# Patient Record
Sex: MALE | Race: WHITE | Employment: OTHER | URBAN - NONMETROPOLITAN AREA
[De-identification: names, ages, dates, MRNs, and addresses within clinical notes are randomized per-mention and may not be internally consistent; named-entity substitution may affect disease eponyms.]

---

## 2017-01-09 ENCOUNTER — OFFICE VISIT (OUTPATIENT)
Dept: CARDIOLOGY | Age: 82
End: 2017-01-09
Payer: MEDICARE

## 2017-01-09 VITALS
SYSTOLIC BLOOD PRESSURE: 102 MMHG | WEIGHT: 238 LBS | DIASTOLIC BLOOD PRESSURE: 60 MMHG | HEART RATE: 80 BPM | BODY MASS INDEX: 36.07 KG/M2 | HEIGHT: 68 IN

## 2017-01-09 DIAGNOSIS — I25.10 ASHD (ARTERIOSCLEROTIC HEART DISEASE): ICD-10-CM

## 2017-01-09 DIAGNOSIS — I49.5 SICK SINUS SYNDROME (HCC): Primary | ICD-10-CM

## 2017-01-09 DIAGNOSIS — Z95.0 PACEMAKER: ICD-10-CM

## 2017-01-09 DIAGNOSIS — I50.32 CHRONIC DIASTOLIC HEART FAILURE (HCC): ICD-10-CM

## 2017-01-09 DIAGNOSIS — I44.2 COMPLETE HEART BLOCK (HCC): ICD-10-CM

## 2017-01-09 PROCEDURE — 99214 OFFICE O/P EST MOD 30 MIN: CPT | Performed by: INTERNAL MEDICINE

## 2017-01-09 PROCEDURE — 93280 PM DEVICE PROGR EVAL DUAL: CPT | Performed by: INTERNAL MEDICINE

## 2017-01-09 RX ORDER — BUMETANIDE 1 MG/1
1 TABLET ORAL DAILY
COMMUNITY
Start: 2016-12-31 | End: 2017-07-07 | Stop reason: CLARIF

## 2017-04-11 DIAGNOSIS — Z95.0 PACEMAKER: Primary | ICD-10-CM

## 2017-04-11 DIAGNOSIS — I44.2 COMPLETE HEART BLOCK (HCC): ICD-10-CM

## 2017-04-11 PROCEDURE — 93296 REM INTERROG EVL PM/IDS: CPT | Performed by: CLINICAL NURSE SPECIALIST

## 2017-04-11 PROCEDURE — 93294 REM INTERROG EVL PM/LDLS PM: CPT | Performed by: CLINICAL NURSE SPECIALIST

## 2017-05-23 LAB
ALBUMIN SERPL-MCNC: 4 G/DL (ref 3.5–5.2)
ALP BLD-CCNC: 77 U/L (ref 40–130)
ALT SERPL-CCNC: 12 U/L (ref 5–41)
ANION GAP SERPL CALCULATED.3IONS-SCNC: 18 MMOL/L (ref 7–19)
AST SERPL-CCNC: 15 U/L (ref 5–40)
BILIRUB SERPL-MCNC: 0.5 MG/DL (ref 0.2–1.2)
BILIRUBIN DIRECT: 0.2 MG/DL (ref 0–0.3)
BILIRUBIN, INDIRECT: 0.3 MG/DL (ref 0.1–1)
BUN BLDV-MCNC: 33 MG/DL (ref 8–23)
CALCIUM SERPL-MCNC: 9.3 MG/DL (ref 8.2–9.6)
CHLORIDE BLD-SCNC: 102 MMOL/L (ref 98–111)
CHOLESTEROL, TOTAL: 133 MG/DL (ref 160–199)
CO2: 21 MMOL/L (ref 22–29)
CREAT SERPL-MCNC: 1.4 MG/DL (ref 0.5–1.2)
CREATININE URINE: 73.5 MG/DL (ref 4.2–622)
GFR NON-AFRICAN AMERICAN: 47
GLUCOSE BLD-MCNC: 127 MG/DL (ref 74–109)
HBA1C MFR BLD: 9.6 %
HDLC SERPL-MCNC: 37 MG/DL (ref 55–121)
LDL CHOLESTEROL CALCULATED: 71 MG/DL
MICROALBUMIN UR-MCNC: <1.2 MG/DL (ref 0–19)
MICROALBUMIN/CREAT UR-RTO: NORMAL MG/G
POTASSIUM SERPL-SCNC: 4.8 MMOL/L (ref 3.5–5)
SODIUM BLD-SCNC: 141 MMOL/L (ref 136–145)
TOTAL PROTEIN: 7.8 G/DL (ref 6.6–8.7)
TRIGL SERPL-MCNC: 126 MG/DL (ref 150–199)

## 2017-07-07 ENCOUNTER — OFFICE VISIT (OUTPATIENT)
Dept: INTERNAL MEDICINE | Age: 82
End: 2017-07-07
Payer: MEDICARE

## 2017-07-07 VITALS
SYSTOLIC BLOOD PRESSURE: 110 MMHG | OXYGEN SATURATION: 97 % | WEIGHT: 233 LBS | BODY MASS INDEX: 39.78 KG/M2 | HEIGHT: 64 IN | HEART RATE: 61 BPM | DIASTOLIC BLOOD PRESSURE: 64 MMHG

## 2017-07-07 DIAGNOSIS — E78.00 PURE HYPERCHOLESTEROLEMIA: Chronic | ICD-10-CM

## 2017-07-07 DIAGNOSIS — Z79.4 TYPE 2 DIABETES MELLITUS WITH COMPLICATION, WITH LONG-TERM CURRENT USE OF INSULIN (HCC): Primary | Chronic | ICD-10-CM

## 2017-07-07 DIAGNOSIS — D63.1 ANEMIA IN CHRONIC KIDNEY DISEASE: Chronic | ICD-10-CM

## 2017-07-07 DIAGNOSIS — N18.9 ANEMIA IN CHRONIC KIDNEY DISEASE: Chronic | ICD-10-CM

## 2017-07-07 DIAGNOSIS — E11.8 TYPE 2 DIABETES MELLITUS WITH COMPLICATION, WITH LONG-TERM CURRENT USE OF INSULIN (HCC): Primary | Chronic | ICD-10-CM

## 2017-07-07 PROCEDURE — 1123F ACP DISCUSS/DSCN MKR DOCD: CPT | Performed by: INTERNAL MEDICINE

## 2017-07-07 PROCEDURE — 99214 OFFICE O/P EST MOD 30 MIN: CPT | Performed by: INTERNAL MEDICINE

## 2017-07-07 PROCEDURE — 4040F PNEUMOC VAC/ADMIN/RCVD: CPT | Performed by: INTERNAL MEDICINE

## 2017-07-07 PROCEDURE — G8419 CALC BMI OUT NRM PARAM NOF/U: HCPCS | Performed by: INTERNAL MEDICINE

## 2017-07-07 PROCEDURE — 1036F TOBACCO NON-USER: CPT | Performed by: INTERNAL MEDICINE

## 2017-07-07 PROCEDURE — G8598 ASA/ANTIPLAT THER USED: HCPCS | Performed by: INTERNAL MEDICINE

## 2017-07-07 PROCEDURE — G8427 DOCREV CUR MEDS BY ELIG CLIN: HCPCS | Performed by: INTERNAL MEDICINE

## 2017-07-07 RX ORDER — FUROSEMIDE 40 MG/1
40 TABLET ORAL DAILY
COMMUNITY
End: 2017-09-11 | Stop reason: CLARIF

## 2017-07-07 RX ORDER — AMLODIPINE BESYLATE 5 MG/1
5 TABLET ORAL DAILY
COMMUNITY
End: 2017-10-08 | Stop reason: SDUPTHER

## 2017-07-07 ASSESSMENT — PATIENT HEALTH QUESTIONNAIRE - PHQ9
SUM OF ALL RESPONSES TO PHQ9 QUESTIONS 1 & 2: 2
SUM OF ALL RESPONSES TO PHQ QUESTIONS 1-9: 2
1. LITTLE INTEREST OR PLEASURE IN DOING THINGS: 1
2. FEELING DOWN, DEPRESSED OR HOPELESS: 1

## 2017-07-12 ENCOUNTER — OFFICE VISIT (OUTPATIENT)
Dept: CARDIOLOGY | Age: 82
End: 2017-07-12
Payer: MEDICARE

## 2017-07-12 VITALS
WEIGHT: 239 LBS | BODY MASS INDEX: 40.8 KG/M2 | SYSTOLIC BLOOD PRESSURE: 128 MMHG | HEART RATE: 84 BPM | HEIGHT: 64 IN | DIASTOLIC BLOOD PRESSURE: 60 MMHG

## 2017-07-12 DIAGNOSIS — Z95.0 PACEMAKER: ICD-10-CM

## 2017-07-12 DIAGNOSIS — I50.32 CHRONIC DIASTOLIC CONGESTIVE HEART FAILURE (HCC): ICD-10-CM

## 2017-07-12 DIAGNOSIS — I44.2 COMPLETE HEART BLOCK (HCC): Primary | ICD-10-CM

## 2017-07-12 DIAGNOSIS — I25.10 CORONARY ARTERY DISEASE INVOLVING NATIVE CORONARY ARTERY OF NATIVE HEART WITHOUT ANGINA PECTORIS: Chronic | ICD-10-CM

## 2017-07-12 PROCEDURE — G8598 ASA/ANTIPLAT THER USED: HCPCS | Performed by: CLINICAL NURSE SPECIALIST

## 2017-07-12 PROCEDURE — 1036F TOBACCO NON-USER: CPT | Performed by: CLINICAL NURSE SPECIALIST

## 2017-07-12 PROCEDURE — 1123F ACP DISCUSS/DSCN MKR DOCD: CPT | Performed by: CLINICAL NURSE SPECIALIST

## 2017-07-12 PROCEDURE — 99213 OFFICE O/P EST LOW 20 MIN: CPT | Performed by: CLINICAL NURSE SPECIALIST

## 2017-07-12 PROCEDURE — 93280 PM DEVICE PROGR EVAL DUAL: CPT | Performed by: CLINICAL NURSE SPECIALIST

## 2017-07-12 PROCEDURE — 4040F PNEUMOC VAC/ADMIN/RCVD: CPT | Performed by: CLINICAL NURSE SPECIALIST

## 2017-07-12 PROCEDURE — G8427 DOCREV CUR MEDS BY ELIG CLIN: HCPCS | Performed by: CLINICAL NURSE SPECIALIST

## 2017-07-12 PROCEDURE — G8417 CALC BMI ABV UP PARAM F/U: HCPCS | Performed by: CLINICAL NURSE SPECIALIST

## 2017-07-12 ASSESSMENT — ENCOUNTER SYMPTOMS
COUGH: 0
BLURRED VISION: 0
BLOOD IN STOOL: 0
HEARTBURN: 0
NAUSEA: 0
ORTHOPNEA: 0
SHORTNESS OF BREATH: 1
VOMITING: 0

## 2017-08-28 RX ORDER — EZETIMIBE 10 MG/1
TABLET ORAL
Qty: 90 TABLET | Refills: 1 | Status: SHIPPED | OUTPATIENT
Start: 2017-08-28 | End: 2018-02-24 | Stop reason: SDUPTHER

## 2017-08-30 RX ORDER — POLYETHYLENE GLYCOL 3350 17 G/17G
POWDER, FOR SOLUTION ORAL
COMMUNITY
Start: 2017-08-07 | End: 2018-05-17 | Stop reason: SDUPTHER

## 2017-08-31 RX ORDER — CARVEDILOL 3.12 MG/1
TABLET ORAL
Qty: 180 TABLET | Refills: 1 | Status: SHIPPED | OUTPATIENT
Start: 2017-08-31 | End: 2018-02-27 | Stop reason: SDUPTHER

## 2017-09-07 RX ORDER — INSULIN GLARGINE 100 [IU]/ML
INJECTION, SOLUTION SUBCUTANEOUS
Qty: 50 ML | Refills: 3 | Status: SHIPPED | OUTPATIENT
Start: 2017-09-07 | End: 2018-12-17 | Stop reason: SDUPTHER

## 2017-09-07 RX ORDER — SPIRONOLACTONE 25 MG/1
TABLET ORAL
Qty: 90 TABLET | Refills: 3 | Status: ON HOLD | OUTPATIENT
Start: 2017-09-07 | End: 2018-08-26

## 2017-09-11 ENCOUNTER — OFFICE VISIT (OUTPATIENT)
Dept: INTERNAL MEDICINE | Age: 82
End: 2017-09-11
Payer: MEDICARE

## 2017-09-11 VITALS
HEIGHT: 66 IN | DIASTOLIC BLOOD PRESSURE: 80 MMHG | OXYGEN SATURATION: 98 % | HEART RATE: 76 BPM | SYSTOLIC BLOOD PRESSURE: 136 MMHG | WEIGHT: 232 LBS | BODY MASS INDEX: 37.28 KG/M2

## 2017-09-11 DIAGNOSIS — C18.4 MALIGNANT NEOPLASM OF TRANSVERSE COLON (HCC): ICD-10-CM

## 2017-09-11 DIAGNOSIS — L73.9 ACUTE FOLLICULITIS: ICD-10-CM

## 2017-09-11 DIAGNOSIS — R32 URINARY INCONTINENCE, UNSPECIFIED TYPE: Primary | ICD-10-CM

## 2017-09-11 DIAGNOSIS — R35.1 NOCTURIA MORE THAN TWICE PER NIGHT: ICD-10-CM

## 2017-09-11 PROCEDURE — 4040F PNEUMOC VAC/ADMIN/RCVD: CPT | Performed by: INTERNAL MEDICINE

## 2017-09-11 PROCEDURE — 99214 OFFICE O/P EST MOD 30 MIN: CPT | Performed by: INTERNAL MEDICINE

## 2017-09-11 PROCEDURE — G8598 ASA/ANTIPLAT THER USED: HCPCS | Performed by: INTERNAL MEDICINE

## 2017-09-11 PROCEDURE — G8427 DOCREV CUR MEDS BY ELIG CLIN: HCPCS | Performed by: INTERNAL MEDICINE

## 2017-09-11 PROCEDURE — 1036F TOBACCO NON-USER: CPT | Performed by: INTERNAL MEDICINE

## 2017-09-11 PROCEDURE — G8417 CALC BMI ABV UP PARAM F/U: HCPCS | Performed by: INTERNAL MEDICINE

## 2017-09-11 PROCEDURE — 1123F ACP DISCUSS/DSCN MKR DOCD: CPT | Performed by: INTERNAL MEDICINE

## 2017-09-11 RX ORDER — BUMETANIDE 1 MG/1
1 TABLET ORAL DAILY
COMMUNITY
End: 2017-10-12 | Stop reason: CLARIF

## 2017-09-11 RX ORDER — DOXYCYCLINE HYCLATE 100 MG/1
100 CAPSULE ORAL 2 TIMES DAILY
Qty: 20 CAPSULE | Refills: 0 | Status: SHIPPED | OUTPATIENT
Start: 2017-09-11 | End: 2017-09-21

## 2017-09-11 ASSESSMENT — ENCOUNTER SYMPTOMS
WHEEZING: 0
SHORTNESS OF BREATH: 1
EYE ITCHING: 0
BACK PAIN: 0
EYE DISCHARGE: 0
BLOOD IN STOOL: 0
NAUSEA: 0
SORE THROAT: 0
VOMITING: 0
TROUBLE SWALLOWING: 0
ABDOMINAL PAIN: 0
ABDOMINAL DISTENTION: 0

## 2017-09-25 RX ORDER — ATORVASTATIN CALCIUM 10 MG/1
TABLET, FILM COATED ORAL
Qty: 90 TABLET | Refills: 1 | Status: SHIPPED | OUTPATIENT
Start: 2017-09-25 | End: 2018-03-24 | Stop reason: SDUPTHER

## 2017-09-25 RX ORDER — FUROSEMIDE 40 MG/1
TABLET ORAL
Qty: 90 TABLET | Refills: 1 | Status: SHIPPED | OUTPATIENT
Start: 2017-09-25 | End: 2018-03-24 | Stop reason: SDUPTHER

## 2017-10-09 RX ORDER — TIMOLOL MALEATE 5 MG/ML
1 SOLUTION/ DROPS OPHTHALMIC 2 TIMES DAILY
COMMUNITY
Start: 2017-10-04

## 2017-10-09 RX ORDER — AMLODIPINE BESYLATE 5 MG/1
TABLET ORAL
Qty: 90 TABLET | Refills: 1 | Status: SHIPPED | OUTPATIENT
Start: 2017-10-09 | End: 2018-04-07 | Stop reason: SDUPTHER

## 2017-10-09 NOTE — TELEPHONE ENCOUNTER
Bao Mendez called requesting a refill of the below medication which has been pended for you:     Requested Prescriptions     Pending Prescriptions Disp Refills    amLODIPine (NORVASC) 5 MG tablet [Pharmacy Med Name: AMLODIPINE BESYLATE TABS 5MG] 90 tablet 1     Sig: TAKE 1 TABLET DAILY (MUST KEEP FOLLOW UP FOR ADDITIONAL REFILLS)       Last Appointment Date: 9/11/2017  Next Appointment Date: 10/12/2017    No Known Allergies

## 2017-10-12 ENCOUNTER — OFFICE VISIT (OUTPATIENT)
Dept: INTERNAL MEDICINE | Age: 82
End: 2017-10-12
Payer: MEDICARE

## 2017-10-12 VITALS
HEART RATE: 78 BPM | DIASTOLIC BLOOD PRESSURE: 76 MMHG | SYSTOLIC BLOOD PRESSURE: 138 MMHG | OXYGEN SATURATION: 100 % | BODY MASS INDEX: 34.36 KG/M2 | WEIGHT: 232 LBS | RESPIRATION RATE: 18 BRPM | HEIGHT: 69 IN

## 2017-10-12 DIAGNOSIS — Z79.4 TYPE 2 DIABETES MELLITUS WITH COMPLICATION, WITH LONG-TERM CURRENT USE OF INSULIN (HCC): Chronic | ICD-10-CM

## 2017-10-12 DIAGNOSIS — R53.83 FATIGUE, UNSPECIFIED TYPE: ICD-10-CM

## 2017-10-12 DIAGNOSIS — Z79.4 TYPE 2 DIABETES MELLITUS WITH COMPLICATION, WITH LONG-TERM CURRENT USE OF INSULIN (HCC): Primary | Chronic | ICD-10-CM

## 2017-10-12 DIAGNOSIS — E11.8 TYPE 2 DIABETES MELLITUS WITH COMPLICATION, WITH LONG-TERM CURRENT USE OF INSULIN (HCC): Chronic | ICD-10-CM

## 2017-10-12 DIAGNOSIS — Z23 IMMUNIZATION DUE: ICD-10-CM

## 2017-10-12 DIAGNOSIS — I10 ESSENTIAL HYPERTENSION: ICD-10-CM

## 2017-10-12 DIAGNOSIS — E11.8 TYPE 2 DIABETES MELLITUS WITH COMPLICATION, WITH LONG-TERM CURRENT USE OF INSULIN (HCC): Primary | Chronic | ICD-10-CM

## 2017-10-12 LAB — HBA1C MFR BLD: 11.2 %

## 2017-10-12 PROCEDURE — G0009 ADMIN PNEUMOCOCCAL VACCINE: HCPCS | Performed by: INTERNAL MEDICINE

## 2017-10-12 PROCEDURE — 1123F ACP DISCUSS/DSCN MKR DOCD: CPT | Performed by: INTERNAL MEDICINE

## 2017-10-12 PROCEDURE — G8484 FLU IMMUNIZE NO ADMIN: HCPCS | Performed by: INTERNAL MEDICINE

## 2017-10-12 PROCEDURE — 4040F PNEUMOC VAC/ADMIN/RCVD: CPT | Performed by: INTERNAL MEDICINE

## 2017-10-12 PROCEDURE — 1036F TOBACCO NON-USER: CPT | Performed by: INTERNAL MEDICINE

## 2017-10-12 PROCEDURE — G0008 ADMIN INFLUENZA VIRUS VAC: HCPCS | Performed by: INTERNAL MEDICINE

## 2017-10-12 PROCEDURE — G8427 DOCREV CUR MEDS BY ELIG CLIN: HCPCS | Performed by: INTERNAL MEDICINE

## 2017-10-12 PROCEDURE — 90662 IIV NO PRSV INCREASED AG IM: CPT | Performed by: INTERNAL MEDICINE

## 2017-10-12 PROCEDURE — G8417 CALC BMI ABV UP PARAM F/U: HCPCS | Performed by: INTERNAL MEDICINE

## 2017-10-12 PROCEDURE — 99214 OFFICE O/P EST MOD 30 MIN: CPT | Performed by: INTERNAL MEDICINE

## 2017-10-12 PROCEDURE — 90732 PPSV23 VACC 2 YRS+ SUBQ/IM: CPT | Performed by: INTERNAL MEDICINE

## 2017-10-12 PROCEDURE — G8598 ASA/ANTIPLAT THER USED: HCPCS | Performed by: INTERNAL MEDICINE

## 2017-10-12 ASSESSMENT — ENCOUNTER SYMPTOMS
SHORTNESS OF BREATH: 0
BLOOD IN STOOL: 0
TROUBLE SWALLOWING: 0
BACK PAIN: 0
WHEEZING: 0
NAUSEA: 0
EYE DISCHARGE: 0
SORE THROAT: 0
VOMITING: 0
ABDOMINAL DISTENTION: 0
EYE ITCHING: 0
ABDOMINAL PAIN: 0

## 2017-10-12 NOTE — PROGRESS NOTES
[facility]  [address] [facility]  [address] [place] [address]  Dept: [phone]  Dept Fax: [phone]: [phone]      Visit Date: 10/12/2017    Corry Arcos is a 80 y.o. male who presents today for:  Chief Complaint   Patient presents with    3 Month Follow-Up         HPI:     Fam Ruiz is in for 3 month follow-up visit. He's a type II diabetic and it's time for him to have his hemoglobin A1c drawn today. He stays fatigued all the time and has essential hypertension is well-controlled today. His fatigue is mainly just due to deconditioning and the fact that he is not able to ambulate and get around much. He looks better than he slept for a while though it appears to be more animated and looks like he feels better.     Social History   Substance Use Topics    Smoking status: Former Smoker    Smokeless tobacco: Never Used    Alcohol use No      Current Outpatient Prescriptions   Medication Sig Dispense Refill    amLODIPine (NORVASC) 5 MG tablet TAKE 1 TABLET DAILY (MUST KEEP FOLLOW UP FOR ADDITIONAL REFILLS) 90 tablet 1    timolol (TIMOPTIC) 0.5 % ophthalmic solution       furosemide (LASIX) 40 MG tablet TAKE 1 TABLET DAILY 90 tablet 1    atorvastatin (LIPITOR) 10 MG tablet TAKE 1 TABLET DAILY AT BEDTIME 90 tablet 1    spironolactone (ALDACTONE) 25 MG tablet TAKE 1 TABLET DAILY 90 tablet 3    LANTUS 100 UNIT/ML injection vial INJECT 40 UNITS UNDER THE SKIN AT BEDTIME 50 mL 3    carvedilol (COREG) 3.125 MG tablet TAKE 1 TABLET TWICE A  tablet 1    polyethylene glycol (GLYCOLAX) powder       ZETIA 10 MG tablet TAKE 1 TABLET DAILY 90 tablet 1    HUMALOG 100 UNIT/ML injection vial INJECT AS DIRECTED PER SLIDING SCALE BASED ON GLUCOSE READINGS FOUR TIMES A DAY AS NEEDED 10 mL 1    isosorbide dinitrate (ISORDIL) 5 MG tablet Take 3 tablets by mouth 2 times daily 120 tablet 3    Naproxen Sodium 220 MG CAPS Take 1 tablet by mouth 2 times BMI 34.26 kg/m²    Physical Exam   Constitutional: He is oriented to person, place, and time. He appears well-developed and well-nourished. No distress. HENT:   Head: Normocephalic and atraumatic. Right Ear: External ear normal.   Left Ear: External ear normal.   Nose: Nose normal.   Eyes: Conjunctivae are normal. Pupils are equal, round, and reactive to light. Right eye exhibits no discharge. Left eye exhibits no discharge. No scleral icterus. Neck: Normal range of motion. Neck supple. No JVD present. No tracheal deviation present. No thyromegaly present. Cardiovascular: Normal rate, regular rhythm, normal heart sounds and intact distal pulses. Exam reveals no gallop and no friction rub. No murmur heard. Pulmonary/Chest: Effort normal and breath sounds normal. No respiratory distress. He has no wheezes. He has no rales. Abdominal: Soft. Bowel sounds are normal. He exhibits no distension and no mass. There is no tenderness. There is no rebound and no guarding. Musculoskeletal: Normal range of motion. He exhibits edema. He exhibits no tenderness or deformity. Lymphadenopathy:     He has no cervical adenopathy. Neurological: He is alert and oriented to person, place, and time. He has normal reflexes. No cranial nerve deficit. He exhibits normal muscle tone. Coordination normal.   Skin: Skin is warm and dry. Rash noted. He is not diaphoretic. No erythema. Cellulitis on his face   Psychiatric: He has a normal mood and affect. His behavior is normal. Judgment and thought content normal.        Assessment:     1. Type 2 diabetes mellitus with complication, with long-term current use of insulin ([facility])  Hemoglobin A1C   2. Essential hypertension     3. Fatigue, unspecified type              Plan:      Type II diabetes mellitus. It's time for hemoglobin A1c today social is here regarding her all that. Regarding continue the same medications. Essential hypertension.  He is at goal on his current dose of medication were going continue the same. He is not having any side effects and his edema looks better. Chronic fatigue this appears to be about the same though he does appear a little more energetic today and looks like he feels better. States he still has good days and bad days and walks with a walker. Overall I think he's about as good as were going to be able to get him with his heart as bad as it is at his age. Overall Erin Records looks good. Her medical draws A1c today as outlined above. Continue the same medicines. See him back in 3 months. Will get a flu shot and a Pneumovax today    Return in about 3 months (around 1/12/2018). Patient given educational materials - see patient instructions. Discussed use, benefit, and side effects of prescribed medications. All patient questions answered. Pt voiced understanding. Reviewed health maintenance. Instructed to continue current medications, diet and exercise. Patient agreed with treatment plan. **This report has been created using voice recognition software. It may contain minor errors which are inherent in voice recognition technology. **    Electronically signed by Woo Chen MD on 10/12/2017 at 10:13 AM

## 2017-10-17 ENCOUNTER — TELEPHONE (OUTPATIENT)
Dept: CARDIOLOGY | Age: 82
End: 2017-10-17

## 2017-10-17 DIAGNOSIS — Z95.0 PACEMAKER: Primary | ICD-10-CM

## 2017-10-17 DIAGNOSIS — I44.2 COMPLETE HEART BLOCK (HCC): ICD-10-CM

## 2017-10-17 PROCEDURE — 93294 REM INTERROG EVL PM/LDLS PM: CPT | Performed by: CLINICAL NURSE SPECIALIST

## 2017-10-17 PROCEDURE — 93296 REM INTERROG EVL PM/IDS: CPT | Performed by: CLINICAL NURSE SPECIALIST

## 2017-12-11 NOTE — TELEPHONE ENCOUNTER
Nish Gu called requesting a refill of the below medication which has been pended for you:     Requested Prescriptions     Pending Prescriptions Disp Refills    HUMALOG 100 UNIT/ML injection vial [Pharmacy Med Name: Erminio Kanner 10ML 100U/ML] 10 mL 1     Sig: INJECT AS DIRECTED PER SLIDING SCALE BASED ON GLUCOSE READINGS FOUR TIMES A DAY AS NEEDED       Last Appointment Date: 10/12/2017  Next Appointment Date: 1/12/2018    No Known Allergies

## 2018-01-08 DIAGNOSIS — Z12.5 SCREENING FOR MALIGNANT NEOPLASM OF PROSTATE: ICD-10-CM

## 2018-01-08 DIAGNOSIS — R73.09 OTHER ABNORMAL GLUCOSE: ICD-10-CM

## 2018-01-08 DIAGNOSIS — Z00.00 PE (PHYSICAL EXAM), ANNUAL: Primary | ICD-10-CM

## 2018-01-09 DIAGNOSIS — Z00.00 PE (PHYSICAL EXAM), ANNUAL: ICD-10-CM

## 2018-01-09 DIAGNOSIS — Z12.5 SCREENING FOR MALIGNANT NEOPLASM OF PROSTATE: ICD-10-CM

## 2018-01-09 DIAGNOSIS — R73.09 OTHER ABNORMAL GLUCOSE: ICD-10-CM

## 2018-01-09 LAB
ALBUMIN SERPL-MCNC: 3.8 G/DL (ref 3.5–5.2)
ALP BLD-CCNC: 75 U/L (ref 40–130)
ALT SERPL-CCNC: 11 U/L (ref 5–41)
ANION GAP SERPL CALCULATED.3IONS-SCNC: 14 MMOL/L (ref 7–19)
AST SERPL-CCNC: 14 U/L (ref 5–40)
BASOPHILS ABSOLUTE: 0.1 K/UL (ref 0–0.2)
BASOPHILS RELATIVE PERCENT: 0.5 % (ref 0–1)
BILIRUB SERPL-MCNC: 0.4 MG/DL (ref 0.2–1.2)
BUN BLDV-MCNC: 33 MG/DL (ref 8–23)
CALCIUM SERPL-MCNC: 9.5 MG/DL (ref 8.2–9.6)
CHLORIDE BLD-SCNC: 102 MMOL/L (ref 98–111)
CHOLESTEROL, TOTAL: 155 MG/DL (ref 160–199)
CO2: 23 MMOL/L (ref 22–29)
CREAT SERPL-MCNC: 1.4 MG/DL (ref 0.5–1.2)
CREATININE URINE: 174.6 MG/DL (ref 4.2–622)
EOSINOPHILS ABSOLUTE: 0.5 K/UL (ref 0–0.6)
EOSINOPHILS RELATIVE PERCENT: 4.6 % (ref 0–5)
GFR NON-AFRICAN AMERICAN: 47
GLUCOSE BLD-MCNC: 161 MG/DL (ref 74–109)
HBA1C MFR BLD: 10.3 %
HCT VFR BLD CALC: 35.9 % (ref 42–52)
HDLC SERPL-MCNC: 37 MG/DL (ref 55–121)
HEMOGLOBIN: 11.9 G/DL (ref 14–18)
LDL CHOLESTEROL CALCULATED: 85 MG/DL
LYMPHOCYTES ABSOLUTE: 3.2 K/UL (ref 1.1–4.5)
LYMPHOCYTES RELATIVE PERCENT: 31.9 % (ref 20–40)
MCH RBC QN AUTO: 32.2 PG (ref 27–31)
MCHC RBC AUTO-ENTMCNC: 33.1 G/DL (ref 33–37)
MCV RBC AUTO: 97.3 FL (ref 80–94)
MICROALBUMIN UR-MCNC: <1.2 MG/DL (ref 0–19)
MICROALBUMIN/CREAT UR-RTO: NORMAL MG/G
MONOCYTES ABSOLUTE: 0.8 K/UL (ref 0–0.9)
MONOCYTES RELATIVE PERCENT: 7.6 % (ref 0–10)
NEUTROPHILS ABSOLUTE: 5.5 K/UL (ref 1.5–7.5)
NEUTROPHILS RELATIVE PERCENT: 55 % (ref 50–65)
PDW BLD-RTO: 13.2 % (ref 11.5–14.5)
PLATELET # BLD: 222 K/UL (ref 130–400)
PMV BLD AUTO: 11.1 FL (ref 9.4–12.4)
POTASSIUM SERPL-SCNC: 4.7 MMOL/L (ref 3.5–5)
PROSTATE SPECIFIC ANTIGEN: 1.21 NG/ML (ref 0–4)
RBC # BLD: 3.69 M/UL (ref 4.7–6.1)
SODIUM BLD-SCNC: 139 MMOL/L (ref 136–145)
TOTAL PROTEIN: 7.5 G/DL (ref 6.6–8.7)
TRIGL SERPL-MCNC: 163 MG/DL (ref 0–149)
WBC # BLD: 9.9 K/UL (ref 4.8–10.8)

## 2018-01-18 ENCOUNTER — OFFICE VISIT (OUTPATIENT)
Dept: INTERNAL MEDICINE | Age: 83
End: 2018-01-18
Payer: MEDICARE

## 2018-01-18 VITALS
OXYGEN SATURATION: 99 % | HEART RATE: 68 BPM | BODY MASS INDEX: 36.26 KG/M2 | DIASTOLIC BLOOD PRESSURE: 60 MMHG | SYSTOLIC BLOOD PRESSURE: 122 MMHG | HEIGHT: 67 IN | WEIGHT: 231 LBS

## 2018-01-18 DIAGNOSIS — E78.00 PURE HYPERCHOLESTEROLEMIA: Chronic | ICD-10-CM

## 2018-01-18 DIAGNOSIS — N18.30 TYPE 2 DIABETES MELLITUS WITH STAGE 3 CHRONIC KIDNEY DISEASE, WITH LONG-TERM CURRENT USE OF INSULIN (HCC): ICD-10-CM

## 2018-01-18 DIAGNOSIS — Z79.4 TYPE 2 DIABETES MELLITUS WITH STAGE 3 CHRONIC KIDNEY DISEASE, WITH LONG-TERM CURRENT USE OF INSULIN (HCC): ICD-10-CM

## 2018-01-18 DIAGNOSIS — E11.8 TYPE 2 DIABETES MELLITUS WITH COMPLICATION, WITH LONG-TERM CURRENT USE OF INSULIN (HCC): Primary | Chronic | ICD-10-CM

## 2018-01-18 DIAGNOSIS — Z79.4 TYPE 2 DIABETES MELLITUS WITH COMPLICATION, WITH LONG-TERM CURRENT USE OF INSULIN (HCC): Primary | Chronic | ICD-10-CM

## 2018-01-18 DIAGNOSIS — I50.42 HEART FAILURE, SYSTOLIC AND DIASTOLIC, CHRONIC (HCC): ICD-10-CM

## 2018-01-18 DIAGNOSIS — D63.1 ANEMIA IN STAGE 3 CHRONIC KIDNEY DISEASE (HCC): Chronic | ICD-10-CM

## 2018-01-18 DIAGNOSIS — N18.30 ANEMIA IN STAGE 3 CHRONIC KIDNEY DISEASE (HCC): Chronic | ICD-10-CM

## 2018-01-18 DIAGNOSIS — E11.22 TYPE 2 DIABETES MELLITUS WITH STAGE 3 CHRONIC KIDNEY DISEASE, WITH LONG-TERM CURRENT USE OF INSULIN (HCC): ICD-10-CM

## 2018-01-18 DIAGNOSIS — I25.10 CORONARY ARTERY DISEASE INVOLVING NATIVE CORONARY ARTERY OF NATIVE HEART WITHOUT ANGINA PECTORIS: Chronic | ICD-10-CM

## 2018-01-18 PROCEDURE — G8484 FLU IMMUNIZE NO ADMIN: HCPCS | Performed by: INTERNAL MEDICINE

## 2018-01-18 PROCEDURE — G8417 CALC BMI ABV UP PARAM F/U: HCPCS | Performed by: INTERNAL MEDICINE

## 2018-01-18 PROCEDURE — 4040F PNEUMOC VAC/ADMIN/RCVD: CPT | Performed by: INTERNAL MEDICINE

## 2018-01-18 PROCEDURE — 99215 OFFICE O/P EST HI 40 MIN: CPT | Performed by: INTERNAL MEDICINE

## 2018-01-18 PROCEDURE — 1036F TOBACCO NON-USER: CPT | Performed by: INTERNAL MEDICINE

## 2018-01-18 PROCEDURE — 1123F ACP DISCUSS/DSCN MKR DOCD: CPT | Performed by: INTERNAL MEDICINE

## 2018-01-18 PROCEDURE — G8427 DOCREV CUR MEDS BY ELIG CLIN: HCPCS | Performed by: INTERNAL MEDICINE

## 2018-01-18 PROCEDURE — G8598 ASA/ANTIPLAT THER USED: HCPCS | Performed by: INTERNAL MEDICINE

## 2018-01-18 ASSESSMENT — ENCOUNTER SYMPTOMS
ABDOMINAL DISTENTION: 0
SORE THROAT: 0
SINUS PRESSURE: 0
BACK PAIN: 0
SHORTNESS OF BREATH: 0
EYE ITCHING: 0
VOMITING: 0
WHEEZING: 0
ABDOMINAL PAIN: 0
EYE DISCHARGE: 0
BLOOD IN STOOL: 0
NAUSEA: 0
TROUBLE SWALLOWING: 0

## 2018-01-18 NOTE — PROGRESS NOTES
[facility]  [address] [facility] [address]  Dept: [phone]  Dept Fax: [phone]: [phone]      Visit Date: 1/18/2018    Kelvin Fairchild is a 80 y.o. male who presents today for:  Chief Complaint   Patient presents with    Annual Exam         HPI:     Rebeca Das is 80years old and is in for his annual checkup. He has diabetes mellitus and history of congestive heart failure and coronary artery disease he also has hypercholesterol. 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 Otherwise he looks better than I seen in a while. He appears to be more animated.     Past Medical History:   Diagnosis Date    Arthritis     CAD (coronary artery disease)     Cancer ([facility]     CHF (congestive heart failure) (HCC)     CKD (chronic kidney disease) stage 3, GFR 30-59 ml/min 5/10/2016    Colon cancer ([facility]     Diabetes mellitus (HCC)     Diastolic heart failure (HCC)     H/O partial resection of colon     Hypertension     Pacemaker     Prostate CA ([facility]     Pure hypercholesterolemia 5/10/2016    Seizures (HCC)     Sick sinus syndrome (HCC)     Skin cancer       Past Surgical History:   Procedure Laterality Date    CARDIAC SURGERY      CHOLECYSTECTOMY      COLECTOMY      COLON SURGERY         Family History   Problem Relation Age of Onset    Cancer Mother     Other Father        Social History   Substance Use Topics    Smoking status: Former Smoker    Smokeless tobacco: Never Used    Alcohol use No      Current Outpatient Prescriptions   Medication Sig Dispense Refill    B-D INS SYR ULTRAFINE 1CC/31G 31G X 5/16\" 1 ML MISC       HUMALOG 100 UNIT/ML injection vial INJECT AS DIRECTED PER SLIDING SCALE BASED ON GLUCOSE READINGS FOUR TIMES A DAY AS NEEDED 10 mL 1    amLODIPine (NORVASC) 5 MG tablet TAKE 1 TABLET DAILY (MUST KEEP FOLLOW UP FOR ADDITIONAL REFILLS) 90 tablet 1    timolol time. He has normal reflexes. No cranial nerve deficit. He exhibits normal muscle tone. Coordination normal.   Skin: Skin is warm and dry. Rash noted. He is not diaphoretic. There is erythema. No pallor. Psychiatric: He has a normal mood and affect. His behavior is normal. Judgment and thought content normal.        Assessment:     1. Type 2 diabetes mellitus with complication, with long-term current use of insulin ([address]     2. Coronary artery disease involving native coronary artery of native heart without angina pectoris     3. Pure hypercholesterolemia     4. Anemia in stage 3 chronic kidney disease              Plan:      2 diabetes mellitus which is not a goal but is improving. His hemoglobin A1c is down to 10.2 up from 11.6. I've talked him again about diet sounds like he's been taking in too many carbohydrates. He's Loc or possibly bread as well as sweets. They live in an assisted living facility and his wife has Alzheimer's dementia I don't think were going to get him to be more compliant with diet and we are. He seems to also be a little unsteady on his feet and has had difficulty with balance. I tend 1 carry his blood sugar higher than lower because I don't want him to have any kind of a hypoglycemic fall. It's not optimal but is probably the best we can get with him. Coronary artery disease which is stable. He's going to see the cardiologist next month. He's had a pacemaker placed and Dr. Conrado Swain is his regular cardiologist.    Hypercholesterol which is at goal. He is on medication were going continue the same dose. Anemia of chronic disease and stage III kidney insufficiency related to his diabetes. He is holding his own as far as his renal function. Regarding continue as he's doing an. Otherwise not make any changes. Overall his biggest issue is his diabetes and is due to noncompliance on the part of diet. Part of the things he just doesn't understand.  Part of it also is due to the fact he just doesn't have the 3 will power to make any changes. I'll see him back at his regular scheduled appointment in 6 months. Return in about 6 months (around 7/18/2018) for blood pressure check, diabetes check, liver ,lipid check. Patient given educational materials - see patient instructions. Discussed use, benefit, and side effects of prescribed medications. All patient questions answered. Pt voiced understanding. Reviewed health maintenance. Instructed to continue current medications, diet and exercise. Patient agreed with treatment plan. **This report has been created using voice recognition software. It may contain minor errors which are inherent in voice recognition technology. **    Electronically signed by Meaghan Bee MD on 1/18/2018 at 2:30 PM

## 2018-01-29 ENCOUNTER — OFFICE VISIT (OUTPATIENT)
Dept: CARDIOLOGY | Age: 83
End: 2018-01-29
Payer: MEDICARE

## 2018-01-29 VITALS
HEIGHT: 68 IN | DIASTOLIC BLOOD PRESSURE: 62 MMHG | SYSTOLIC BLOOD PRESSURE: 120 MMHG | HEART RATE: 67 BPM | BODY MASS INDEX: 34.71 KG/M2 | WEIGHT: 229 LBS

## 2018-01-29 DIAGNOSIS — I25.10 CORONARY ARTERY DISEASE INVOLVING NATIVE CORONARY ARTERY OF NATIVE HEART WITHOUT ANGINA PECTORIS: Chronic | ICD-10-CM

## 2018-01-29 DIAGNOSIS — I49.5 SICK SINUS SYNDROME (HCC): Primary | ICD-10-CM

## 2018-01-29 DIAGNOSIS — Z95.0 PACEMAKER: ICD-10-CM

## 2018-01-29 DIAGNOSIS — I50.32 CHRONIC DIASTOLIC CONGESTIVE HEART FAILURE (HCC): ICD-10-CM

## 2018-01-29 PROCEDURE — G8417 CALC BMI ABV UP PARAM F/U: HCPCS | Performed by: CLINICAL NURSE SPECIALIST

## 2018-01-29 PROCEDURE — 93280 PM DEVICE PROGR EVAL DUAL: CPT | Performed by: CLINICAL NURSE SPECIALIST

## 2018-01-29 PROCEDURE — 1123F ACP DISCUSS/DSCN MKR DOCD: CPT | Performed by: CLINICAL NURSE SPECIALIST

## 2018-01-29 PROCEDURE — 4040F PNEUMOC VAC/ADMIN/RCVD: CPT | Performed by: CLINICAL NURSE SPECIALIST

## 2018-01-29 PROCEDURE — G8427 DOCREV CUR MEDS BY ELIG CLIN: HCPCS | Performed by: CLINICAL NURSE SPECIALIST

## 2018-01-29 PROCEDURE — 99213 OFFICE O/P EST LOW 20 MIN: CPT | Performed by: CLINICAL NURSE SPECIALIST

## 2018-01-29 PROCEDURE — 1036F TOBACCO NON-USER: CPT | Performed by: CLINICAL NURSE SPECIALIST

## 2018-01-29 PROCEDURE — G8598 ASA/ANTIPLAT THER USED: HCPCS | Performed by: CLINICAL NURSE SPECIALIST

## 2018-01-29 PROCEDURE — G8484 FLU IMMUNIZE NO ADMIN: HCPCS | Performed by: CLINICAL NURSE SPECIALIST

## 2018-01-29 RX ORDER — CLOPIDOGREL BISULFATE 75 MG/1
TABLET ORAL
Qty: 90 TABLET | Refills: 3 | Status: SHIPPED | OUTPATIENT
Start: 2018-01-29 | End: 2019-01-22 | Stop reason: ALTCHOICE

## 2018-01-29 ASSESSMENT — ENCOUNTER SYMPTOMS
COUGH: 0
ORTHOPNEA: 0
HEARTBURN: 0
VOMITING: 0
BLURRED VISION: 0
SHORTNESS OF BREATH: 1
NAUSEA: 0
BLOOD IN STOOL: 0

## 2018-01-29 NOTE — TELEPHONE ENCOUNTER
Cindy Cleveland called requesting a refill of the below medication which has been pended for you:     Requested Prescriptions     Pending Prescriptions Disp Refills    clopidogrel (PLAVIX) 75 MG tablet [Pharmacy Med Name: CLOPIDOGREL BISULFATE TABS 75MG] 90 tablet 3     Sig: TAKE 1 TABLET DAILY       Last Appointment Date: 1/18/2018  Next Appointment Date: 7/17/2018    No Known Allergies

## 2018-01-29 NOTE — PATIENT INSTRUCTIONS
Followup With [facility]RUT[facility] 4/30/18    Call with any questions or concerns  Follow up with Tima Leon MD for non cardiac problems  Report any new problems  Cardiovascular Fitness-Exercise as tolerated. Strive for 15 minutes of exercise most days of the week. Cardiac / Healthy Diet  Continue current medications as directed  Continue plan of treatment  It is always recommended that you bring your medications bottles with you to each visit - this is for your safety! Patient Education        Pacemaker Placement for Heart Failure: What to Expect at [address] pacemaker for heart failure is a biventricular pacemaker (say \"mike-naga-TRICK-jessica-bree\"). Treatment that uses this type of pacemaker is called cardiac resynchronization therapy (CRT). When you have heart failure, the lower chambers of your heart may not pump at the same time. The pacemaker sends painless electrical signals to your heart. These signals make the chambers pump at the same time. This can help your heart pump blood better and help you feel better. Your pacemaker may be combined with an ICD, or implantable cardioverter-defibrillator. It can control abnormal heart rhythms. This can prevent sudden death. Your chest may be sore where the doctor made the cut (incision) and put in the pacemaker. You also may have a bruise and mild swelling. These symptoms usually get better in 1 to 2 weeks. You may feel a hard ridge along the incision. This usually gets softer in the months after surgery. You may be able to see or feel the outline of the pacemaker under your skin. You will probably be able to go back to work or your usual routine 1 to 2 weeks after surgery. Pacemaker batteries usually last 5 to 15 years. Your doctor will talk to you about how often you will need to have your pacemaker checked. You can safely use most household and office electronics.  This includes things such as kitchen appliances, electric power you call for help? Call 911 anytime you think you may need emergency care. For example, call if:  ? · You passed out (lost consciousness). ? · You have trouble breathing. ?Call your doctor now or seek immediate medical care if:  ? · You are dizzy or light-headed, or you feel like you may faint. ? · You have pain that does not get better after you take pain medicine. ? · You hear an alarm or feel a vibration from your pacemaker. ? · You have loose stitches, or your incision comes open. ? · Bright red blood has soaked through the bandage over your incision. ? · You have signs of infection, such as:  ¨ Increased pain, swelling, warmth, or redness. ¨ Red streaks leading from the incision. ¨ Pus draining from the incision. ¨ A fever. ? Watch closely for changes in your health, and be sure to contact your doctor if:  ? · You have any problems with your pacemaker. Where can you learn more? Go to [email or web address] org and sign in to your [employer] account. Enter T984 in the [employer] box to learn more about \"Pacemaker Placement for Heart Failure: What to Expect at Home. \"     If you do not have an account, please click on the \"Sign Up Now\" link. Current as of: September 21, 2016  Content Version: 11.5  © 8506-3997 Healthwise, Incorporated. Care instructions adapted under license by [facility] ([facility]). If you have questions about a medical condition or this instruction, always ask your healthcare professional. [place] [address] any warranty or liability for your use of this information.

## 2018-01-29 NOTE — PROGRESS NOTES
5/10/2016    Seizures (HCC)     Sick sinus syndrome (HCC)     Skin cancer      Past Surgical History:   Procedure Laterality Date    CARDIAC SURGERY      CHOLECYSTECTOMY      COLECTOMY      COLON SURGERY       Family History   Problem Relation Age of Onset    Cancer Mother     Other Father      Social History   Substance Use Topics    Smoking status: Former Smoker    Smokeless tobacco: Never Used    Alcohol use No      Current Outpatient Prescriptions   Medication Sig Dispense Refill    clopidogrel (PLAVIX) 75 MG tablet TAKE 1 TABLET DAILY 90 tablet 3    insulin lispro (HUMALOG) 100 UNIT/ML injection vial As directed per sliding scale 6 vial 3    B-D INS SYR ULTRAFINE 1CC/31G 31G X 5/16\" 1 ML MISC       amLODIPine (NORVASC) 5 MG tablet TAKE 1 TABLET DAILY (MUST KEEP FOLLOW UP FOR ADDITIONAL REFILLS) 90 tablet 1    timolol (TIMOPTIC) 0.5 % ophthalmic solution       furosemide (LASIX) 40 MG tablet TAKE 1 TABLET DAILY 90 tablet 1    atorvastatin (LIPITOR) 10 MG tablet TAKE 1 TABLET DAILY AT BEDTIME 90 tablet 1    spironolactone (ALDACTONE) 25 MG tablet TAKE 1 TABLET DAILY 90 tablet 3    LANTUS 100 UNIT/ML injection vial INJECT 40 UNITS UNDER THE SKIN AT BEDTIME 50 mL 3    carvedilol (COREG) 3.125 MG tablet TAKE 1 TABLET TWICE A  tablet 1    polyethylene glycol (GLYCOLAX) powder       ZETIA 10 MG tablet TAKE 1 TABLET DAILY 90 tablet 1    isosorbide dinitrate (ISORDIL) 5 MG tablet Take 3 tablets by mouth 2 times daily 120 tablet 3    Naproxen Sodium 220 MG CAPS Take 1 tablet by mouth 2 times daily as needed for Pain      Coenzyme Q10 (COQ10 PO) Take 10 mg by mouth nightly      Garlic 10 MG CAPS Take 1 capsule by mouth daily Pt unsure of dose. States it changes because he gets whatever is on sale.       Multiple Vitamins-Minerals (THERAPEUTIC MULTIVITAMIN-MINERALS) tablet Take 1 tablet by mouth daily      meclizine (ANTIVERT) 12.5 MG tablet Take 12.5 mg by mouth 3 times daily as needed

## 2018-02-08 RX ORDER — ISOSORBIDE DINITRATE 5 MG/1
TABLET ORAL
Qty: 540 TABLET | Refills: 2 | Status: SHIPPED | OUTPATIENT
Start: 2018-02-08 | End: 2018-11-05 | Stop reason: SDUPTHER

## 2018-02-08 NOTE — TELEPHONE ENCOUNTER
Grazyna Atwood called requesting a refill of the below medication which has been pended for you:     Requested Prescriptions     Pending Prescriptions Disp Refills    isosorbide dinitrate (ISORDIL) 5 MG tablet [Pharmacy Med Name: ISOSORBIDE DINIT TABS 5MG] 540 tablet 2     Sig: TAKE 3 TABLETS TWICE A DAY       Last Appointment Date: 1/18/2018  Next Appointment Date: 7/17/2018    No Known Allergies

## 2018-02-26 RX ORDER — EZETIMIBE 10 MG/1
TABLET ORAL
Qty: 90 TABLET | Refills: 1 | Status: SHIPPED | OUTPATIENT
Start: 2018-02-26 | End: 2018-08-25 | Stop reason: SDUPTHER

## 2018-02-26 NOTE — TELEPHONE ENCOUNTER
Chelita Benavides called requesting a refill of the below medication which has been pended for you:     Requested Prescriptions     Pending Prescriptions Disp Refills    ezetimibe (ZETIA) 10 MG tablet [Pharmacy Med Name: EZETIMIBE TABS 10MG] 90 tablet 1     Sig: TAKE 1 TABLET DAILY       Last Appointment Date: 1/18/2018  Next Appointment Date: 3/1/2018    No Known Allergies

## 2018-02-27 RX ORDER — CARVEDILOL 3.12 MG/1
TABLET ORAL
Qty: 180 TABLET | Refills: 1 | Status: SHIPPED | OUTPATIENT
Start: 2018-02-27 | End: 2018-08-26 | Stop reason: SDUPTHER

## 2018-03-01 ENCOUNTER — OFFICE VISIT (OUTPATIENT)
Dept: INTERNAL MEDICINE | Age: 83
End: 2018-03-01
Payer: MEDICARE

## 2018-03-01 VITALS
HEIGHT: 68 IN | BODY MASS INDEX: 35.31 KG/M2 | OXYGEN SATURATION: 97 % | HEART RATE: 80 BPM | DIASTOLIC BLOOD PRESSURE: 70 MMHG | SYSTOLIC BLOOD PRESSURE: 128 MMHG | WEIGHT: 233 LBS

## 2018-03-01 DIAGNOSIS — E11.8 TYPE 2 DIABETES MELLITUS WITH COMPLICATION, WITH LONG-TERM CURRENT USE OF INSULIN (HCC): Primary | Chronic | ICD-10-CM

## 2018-03-01 DIAGNOSIS — I25.10 CORONARY ARTERY DISEASE INVOLVING NATIVE CORONARY ARTERY OF NATIVE HEART WITHOUT ANGINA PECTORIS: Chronic | ICD-10-CM

## 2018-03-01 DIAGNOSIS — E78.00 PURE HYPERCHOLESTEROLEMIA: Chronic | ICD-10-CM

## 2018-03-01 DIAGNOSIS — D63.1 ANEMIA IN STAGE 3 CHRONIC KIDNEY DISEASE (HCC): Chronic | ICD-10-CM

## 2018-03-01 DIAGNOSIS — N18.30 ANEMIA IN STAGE 3 CHRONIC KIDNEY DISEASE (HCC): Chronic | ICD-10-CM

## 2018-03-01 DIAGNOSIS — Z79.4 TYPE 2 DIABETES MELLITUS WITH COMPLICATION, WITH LONG-TERM CURRENT USE OF INSULIN (HCC): Primary | Chronic | ICD-10-CM

## 2018-03-01 PROCEDURE — 99214 OFFICE O/P EST MOD 30 MIN: CPT | Performed by: INTERNAL MEDICINE

## 2018-03-01 PROCEDURE — G8484 FLU IMMUNIZE NO ADMIN: HCPCS | Performed by: INTERNAL MEDICINE

## 2018-03-01 PROCEDURE — G8598 ASA/ANTIPLAT THER USED: HCPCS | Performed by: INTERNAL MEDICINE

## 2018-03-01 PROCEDURE — 4040F PNEUMOC VAC/ADMIN/RCVD: CPT | Performed by: INTERNAL MEDICINE

## 2018-03-01 PROCEDURE — G8417 CALC BMI ABV UP PARAM F/U: HCPCS | Performed by: INTERNAL MEDICINE

## 2018-03-01 PROCEDURE — 1036F TOBACCO NON-USER: CPT | Performed by: INTERNAL MEDICINE

## 2018-03-01 PROCEDURE — G8427 DOCREV CUR MEDS BY ELIG CLIN: HCPCS | Performed by: INTERNAL MEDICINE

## 2018-03-01 PROCEDURE — 1123F ACP DISCUSS/DSCN MKR DOCD: CPT | Performed by: INTERNAL MEDICINE

## 2018-03-01 ASSESSMENT — ENCOUNTER SYMPTOMS
SHORTNESS OF BREATH: 0
SORE THROAT: 0
NAUSEA: 0
EYE ITCHING: 0
ABDOMINAL DISTENTION: 0
VOMITING: 0
BLOOD IN STOOL: 0
BACK PAIN: 0
EYE DISCHARGE: 0
WHEEZING: 0
TROUBLE SWALLOWING: 0
ABDOMINAL PAIN: 0
SINUS PRESSURE: 0

## 2018-03-01 NOTE — PROGRESS NOTES
any unstable angina. We will continue him on the same medications he is taking. Chronic anemia and stages 3 kidney failure. Plan is to continue his medications like he's doing    Overall he stable I think a lot of this is due to the fact that he's continued to get home health to [facility] [employer]. I think he still would benefit from it and we will Recommend that he continue it long-term. Return for Keep scheduled appointment. .     Patient given educational materials - see patient instructions. Discussed use, benefit, and side effects of prescribed medications. All patient questions answered. Pt voiced understanding. Reviewed health maintenance. Instructed to continue current medications, diet and exercise. Patient agreed with treatment plan. **This report has been created using voice recognition software. It may contain minor errors which are inherent in voice recognition technology. **    Electronically signed by Marci Montalvo MD on 3/1/2018 at 1:17 PM

## 2018-03-26 RX ORDER — ATORVASTATIN CALCIUM 10 MG/1
TABLET, FILM COATED ORAL
Qty: 90 TABLET | Refills: 1 | Status: SHIPPED | OUTPATIENT
Start: 2018-03-26 | End: 2018-09-20 | Stop reason: SDUPTHER

## 2018-03-26 RX ORDER — FUROSEMIDE 40 MG/1
TABLET ORAL
Qty: 90 TABLET | Refills: 1 | Status: ON HOLD | OUTPATIENT
Start: 2018-03-26 | End: 2018-08-26

## 2018-04-09 RX ORDER — AMLODIPINE BESYLATE 5 MG/1
TABLET ORAL
Qty: 90 TABLET | Refills: 1 | Status: SHIPPED | OUTPATIENT
Start: 2018-04-09 | End: 2018-10-04 | Stop reason: SDUPTHER

## 2018-04-30 DIAGNOSIS — I44.2 COMPLETE HEART BLOCK (HCC): ICD-10-CM

## 2018-04-30 DIAGNOSIS — I49.5 SICK SINUS SYNDROME (HCC): ICD-10-CM

## 2018-04-30 DIAGNOSIS — Z95.0 PACEMAKER: Primary | ICD-10-CM

## 2018-04-30 PROCEDURE — 93296 REM INTERROG EVL PM/IDS: CPT | Performed by: CLINICAL NURSE SPECIALIST

## 2018-04-30 PROCEDURE — 93294 REM INTERROG EVL PM/LDLS PM: CPT | Performed by: CLINICAL NURSE SPECIALIST

## 2018-05-17 RX ORDER — POLYETHYLENE GLYCOL 3350 17 G/17G
17 POWDER, FOR SOLUTION ORAL DAILY
Qty: 1 BOTTLE | Refills: 5 | Status: SHIPPED | OUTPATIENT
Start: 2018-05-17

## 2018-05-31 ENCOUNTER — TELEPHONE (OUTPATIENT)
Dept: INTERNAL MEDICINE | Age: 83
End: 2018-05-31

## 2018-05-31 RX ORDER — CITALOPRAM 20 MG/1
20 TABLET ORAL DAILY
Qty: 30 TABLET | Refills: 0 | Status: SHIPPED | OUTPATIENT
Start: 2018-05-31 | End: 2018-08-24

## 2018-07-10 DIAGNOSIS — Z79.4 TYPE 2 DIABETES MELLITUS WITH COMPLICATION, WITH LONG-TERM CURRENT USE OF INSULIN (HCC): Chronic | ICD-10-CM

## 2018-07-10 DIAGNOSIS — E11.8 TYPE 2 DIABETES MELLITUS WITH COMPLICATION, WITH LONG-TERM CURRENT USE OF INSULIN (HCC): Chronic | ICD-10-CM

## 2018-07-10 DIAGNOSIS — E78.00 PURE HYPERCHOLESTEROLEMIA: Chronic | ICD-10-CM

## 2018-07-10 LAB
ALBUMIN SERPL-MCNC: 3.9 G/DL (ref 3.5–5.2)
ALP BLD-CCNC: 68 U/L (ref 40–130)
ALT SERPL-CCNC: 11 U/L (ref 5–41)
AST SERPL-CCNC: 13 U/L (ref 5–40)
BILIRUB SERPL-MCNC: 0.4 MG/DL (ref 0.2–1.2)
BILIRUBIN DIRECT: 0.1 MG/DL (ref 0–0.3)
BILIRUBIN, INDIRECT: 0.3 MG/DL (ref 0.1–1)
CHOLESTEROL, TOTAL: 148 MG/DL (ref 160–199)
HBA1C MFR BLD: 10.5 %
HDLC SERPL-MCNC: 34 MG/DL (ref 55–121)
LDL CHOLESTEROL CALCULATED: 86 MG/DL
TOTAL PROTEIN: 7.6 G/DL (ref 6.6–8.7)
TRIGL SERPL-MCNC: 139 MG/DL (ref 0–149)

## 2018-07-17 ENCOUNTER — OFFICE VISIT (OUTPATIENT)
Dept: INTERNAL MEDICINE | Age: 83
End: 2018-07-17
Payer: MEDICARE

## 2018-07-17 VITALS
SYSTOLIC BLOOD PRESSURE: 110 MMHG | HEIGHT: 68 IN | WEIGHT: 230 LBS | OXYGEN SATURATION: 94 % | HEART RATE: 68 BPM | DIASTOLIC BLOOD PRESSURE: 60 MMHG | BODY MASS INDEX: 34.86 KG/M2

## 2018-07-17 DIAGNOSIS — Z79.4 TYPE 2 DIABETES MELLITUS WITH COMPLICATION, WITH LONG-TERM CURRENT USE OF INSULIN (HCC): Primary | Chronic | ICD-10-CM

## 2018-07-17 DIAGNOSIS — E78.00 PURE HYPERCHOLESTEROLEMIA: Chronic | ICD-10-CM

## 2018-07-17 DIAGNOSIS — E11.8 TYPE 2 DIABETES MELLITUS WITH COMPLICATION, WITH LONG-TERM CURRENT USE OF INSULIN (HCC): Primary | Chronic | ICD-10-CM

## 2018-07-17 DIAGNOSIS — N18.30 CKD (CHRONIC KIDNEY DISEASE) STAGE 3, GFR 30-59 ML/MIN (HCC): Chronic | ICD-10-CM

## 2018-07-17 PROCEDURE — 1123F ACP DISCUSS/DSCN MKR DOCD: CPT | Performed by: INTERNAL MEDICINE

## 2018-07-17 PROCEDURE — G8427 DOCREV CUR MEDS BY ELIG CLIN: HCPCS | Performed by: INTERNAL MEDICINE

## 2018-07-17 PROCEDURE — 4040F PNEUMOC VAC/ADMIN/RCVD: CPT | Performed by: INTERNAL MEDICINE

## 2018-07-17 PROCEDURE — G8417 CALC BMI ABV UP PARAM F/U: HCPCS | Performed by: INTERNAL MEDICINE

## 2018-07-17 PROCEDURE — 99214 OFFICE O/P EST MOD 30 MIN: CPT | Performed by: INTERNAL MEDICINE

## 2018-07-17 PROCEDURE — G8598 ASA/ANTIPLAT THER USED: HCPCS | Performed by: INTERNAL MEDICINE

## 2018-07-17 PROCEDURE — 1036F TOBACCO NON-USER: CPT | Performed by: INTERNAL MEDICINE

## 2018-07-17 PROCEDURE — 1101F PT FALLS ASSESS-DOCD LE1/YR: CPT | Performed by: INTERNAL MEDICINE

## 2018-07-17 ASSESSMENT — ENCOUNTER SYMPTOMS
BACK PAIN: 0
TROUBLE SWALLOWING: 0
NAUSEA: 0
ABDOMINAL DISTENTION: 0
EYE DISCHARGE: 0
WHEEZING: 0
SHORTNESS OF BREATH: 0
BLOOD IN STOOL: 0
SINUS PRESSURE: 0
VOMITING: 0
SORE THROAT: 0
ABDOMINAL PAIN: 0
EYE ITCHING: 0

## 2018-07-17 ASSESSMENT — PATIENT HEALTH QUESTIONNAIRE - PHQ9
2. FEELING DOWN, DEPRESSED OR HOPELESS: 0
SUM OF ALL RESPONSES TO PHQ9 QUESTIONS 1 & 2: 0
SUM OF ALL RESPONSES TO PHQ QUESTIONS 1-9: 0
1. LITTLE INTEREST OR PLEASURE IN DOING THINGS: 0

## 2018-07-17 NOTE — PROGRESS NOTES
Diagnosis Orders   1. Type 2 diabetes mellitus with complication, with long-term current use of insulin (HCC)  Hemoglobin A1C   2. CKD (chronic kidney disease) stage 3, GFR 30-59 ml/min     3. Pure hypercholesterolemia         type II diabetes mellitus which is not a goal, add Victoza 0.6 for the next week and then 1.2 thereafter and add this to her regimen that he is taking and see if we can get his blood sugar down. I think it will help with some of the appetite issues and we'll see how he goes. Chronic kidney disease which is stable at this time. The long-term effects of diabetes's had an effect on his kidneys. As as his heart disease. Hypercholesterol. His cholesterol level is at goal. His liver enzymes are normal. His LDL is less than 100. His last total is less than 200 and his triglycerides are less than 150     overall he looks to be better. His cellulitis of his face is tremendously cleared. He's not taking adequate is much to make the changes with the addition of the toes as outlined above and see him back in 3 month interval.     Plan:         Return in about 3 months (around 10/17/2018) for blood pressure check, diabetes check, LAB 1 WEEK BEFORE APPOINTMENT. Patient given educational materials - see patient instructions. Discussed use, benefit, and side effects of prescribed medications. All patient questions answered. Pt voiced understanding. Reviewed health maintenance. Instructed to continue current medications, diet and exercise. Patient agreed with treatment plan. **This report has been created using voice recognition software. It may contain minor errors which are inherent in voice recognition technology. **    Electronically signed by Nikki Hagen MD on 7/17/2018 at 10:20 AM

## 2018-07-20 RX ORDER — AMLODIPINE BESYLATE 5 MG/1
TABLET ORAL
COMMUNITY
Start: 2018-04-09

## 2018-07-20 RX ORDER — CITALOPRAM 20 MG/1
20 TABLET ORAL
COMMUNITY
Start: 2018-05-31

## 2018-07-20 RX ORDER — ATORVASTATIN CALCIUM 10 MG/1
TABLET, FILM COATED ORAL
COMMUNITY
Start: 2018-03-26

## 2018-07-20 RX ORDER — TIMOLOL MALEATE 5 MG/ML
SOLUTION/ DROPS OPHTHALMIC
COMMUNITY
Start: 2017-10-04

## 2018-07-20 RX ORDER — CARVEDILOL 3.12 MG/1
TABLET ORAL
COMMUNITY
Start: 2018-02-27

## 2018-07-20 RX ORDER — SPIRONOLACTONE 25 MG/1
TABLET ORAL
COMMUNITY
Start: 2017-09-07

## 2018-07-20 RX ORDER — CLOPIDOGREL BISULFATE 75 MG/1
TABLET ORAL
COMMUNITY
Start: 2018-01-29

## 2018-07-20 RX ORDER — EZETIMIBE 10 MG/1
TABLET ORAL
COMMUNITY
Start: 2018-02-26

## 2018-07-20 RX ORDER — FUROSEMIDE 40 MG/1
TABLET ORAL
COMMUNITY
Start: 2018-03-26

## 2018-07-25 ENCOUNTER — OFFICE VISIT (OUTPATIENT)
Dept: CARDIOLOGY | Age: 83
End: 2018-07-25
Payer: MEDICARE

## 2018-07-25 VITALS
DIASTOLIC BLOOD PRESSURE: 76 MMHG | RESPIRATION RATE: 18 BRPM | SYSTOLIC BLOOD PRESSURE: 122 MMHG | WEIGHT: 227 LBS | HEART RATE: 66 BPM | HEIGHT: 67 IN | BODY MASS INDEX: 35.63 KG/M2 | OXYGEN SATURATION: 99 %

## 2018-07-25 DIAGNOSIS — I25.10 CORONARY ARTERY DISEASE INVOLVING NATIVE CORONARY ARTERY OF NATIVE HEART WITHOUT ANGINA PECTORIS: Primary | Chronic | ICD-10-CM

## 2018-07-25 DIAGNOSIS — I50.32 CHRONIC DIASTOLIC CONGESTIVE HEART FAILURE (HCC): ICD-10-CM

## 2018-07-25 DIAGNOSIS — I44.2 COMPLETE HEART BLOCK (HCC): ICD-10-CM

## 2018-07-25 DIAGNOSIS — Z95.0 PACEMAKER: ICD-10-CM

## 2018-07-25 PROCEDURE — G8427 DOCREV CUR MEDS BY ELIG CLIN: HCPCS | Performed by: CLINICAL NURSE SPECIALIST

## 2018-07-25 PROCEDURE — 1036F TOBACCO NON-USER: CPT | Performed by: CLINICAL NURSE SPECIALIST

## 2018-07-25 PROCEDURE — 4040F PNEUMOC VAC/ADMIN/RCVD: CPT | Performed by: CLINICAL NURSE SPECIALIST

## 2018-07-25 PROCEDURE — 1123F ACP DISCUSS/DSCN MKR DOCD: CPT | Performed by: CLINICAL NURSE SPECIALIST

## 2018-07-25 PROCEDURE — G8598 ASA/ANTIPLAT THER USED: HCPCS | Performed by: CLINICAL NURSE SPECIALIST

## 2018-07-25 PROCEDURE — G8417 CALC BMI ABV UP PARAM F/U: HCPCS | Performed by: CLINICAL NURSE SPECIALIST

## 2018-07-25 PROCEDURE — 93280 PM DEVICE PROGR EVAL DUAL: CPT | Performed by: CLINICAL NURSE SPECIALIST

## 2018-07-25 PROCEDURE — 1101F PT FALLS ASSESS-DOCD LE1/YR: CPT | Performed by: CLINICAL NURSE SPECIALIST

## 2018-07-25 PROCEDURE — 99213 OFFICE O/P EST LOW 20 MIN: CPT | Performed by: CLINICAL NURSE SPECIALIST

## 2018-07-25 ASSESSMENT — ENCOUNTER SYMPTOMS
ORTHOPNEA: 0
NAUSEA: 0
COUGH: 0
BLOOD IN STOOL: 0
BLURRED VISION: 0
SHORTNESS OF BREATH: 1
HEARTBURN: 0
VOMITING: 0

## 2018-07-25 NOTE — PROGRESS NOTES
[facility], [address] [place] [address], [address] [employer] [id number]  Phone: [phone]  Fax: [phone]    OFFICE VISIT:  2018    Geneva Maharaj - : 1925    Reason For Visit:  Melody Brewer is a 80 y.o. male who is here for follow-up for sick sinus syndrome and diastolic heart failure as well as CAD    HPI  The patient is here follow-up with a history of CAD, diastolic heart failure, and a pacemaker. He is doing well overall for his age she states. He is not very active, but this is not a change for him. He denies any chest pain, palpitations, orthopnea, PND. He has chronic lower extremity edema she states not worse than usual.  He resides in an assisted living facility    Som Moore MD is PCP.   Geneva Maharaj has the following history as recorded in [facility]:    Patient Active Problem List    Diagnosis Date Noted    Sick sinus syndrome ([address]     Diastolic heart failure ([address]     Pacemaker     Mobitz type 2 second degree heart block     Chronic diastolic congestive heart failure ([address] 05/10/2016    Obesity due to excess calories 05/10/2016    Coronary artery disease involving native coronary artery without angina pectoris 05/10/2016    Complete heart block ([address] 05/10/2016    Glaucoma 05/10/2016    Pure hypercholesterolemia 05/10/2016    Cellulitis of both lower extremities 05/10/2016    Benign prostatic hyperplasia 05/10/2016    CKD (chronic kidney disease) stage 3, GFR 30-59 ml/min 05/10/2016    Anemia in CKD (chronic kidney disease) 05/10/2016    Diabetes mellitus ([address]      Past Medical History:   Diagnosis Date    Arthritis     CAD (coronary artery disease)     Cancer ([address]     CHF (congestive heart failure) (HCC)     CKD (chronic kidney disease) stage 3, GFR 30-59 ml/min 5/10/2016    Colon cancer ([address]     Diabetes mellitus (HCC)     Diastolic heart failure ([address]     H/O partial resection of colon     Hypertension     Pacemaker     Prostate CA ([facility]     Pure hypercholesterolemia 5/10/2016    Seizures (HCC)     Sick sinus syndrome (HCC)     Skin cancer      Past Surgical History:   Procedure Laterality Date    CARDIAC SURGERY      CHOLECYSTECTOMY      COLECTOMY      COLON SURGERY       Family History   Problem Relation Age of Onset    Cancer Mother     Other Father      Social History   Substance Use Topics    Smoking status: Former Smoker    Smokeless tobacco: Never Used    Alcohol use No      Current Outpatient Prescriptions   Medication Sig Dispense Refill    citalopram (CELEXA) 20 MG tablet Take 1 tablet by mouth daily 30 tablet 0    polyethylene glycol (GLYCOLAX) powder Take 17 g by mouth daily 1 Bottle 5    amLODIPine (NORVASC) 5 MG tablet TAKE 1 TABLET DAILY (MUST KEEP FOLLOW UP FOR ADDITIONAL REFILLS) 90 tablet 1    atorvastatin (LIPITOR) 10 MG tablet TAKE 1 TABLET DAILY AT BEDTIME 90 tablet 1    furosemide (LASIX) 40 MG tablet TAKE 1 TABLET DAILY 90 tablet 1    carvedilol (COREG) 3.125 MG tablet TAKE 1 TABLET TWICE A  tablet 1    ezetimibe (ZETIA) 10 MG tablet TAKE 1 TABLET DAILY 90 tablet 1    isosorbide dinitrate (ISORDIL) 5 MG tablet TAKE 3 TABLETS TWICE A  tablet 2    clopidogrel (PLAVIX) 75 MG tablet TAKE 1 TABLET DAILY 90 tablet 3    insulin lispro (HUMALOG) 100 UNIT/ML injection vial As directed per sliding scale 6 vial 3    B-D INS SYR ULTRAFINE 1CC/31G 31G X 5/16\" 1 ML MISC       timolol (TIMOPTIC) 0.5 % ophthalmic solution       spironolactone (ALDACTONE) 25 MG tablet TAKE 1 TABLET DAILY 90 tablet 3    LANTUS 100 UNIT/ML injection vial INJECT 40 UNITS UNDER THE SKIN AT BEDTIME 50 mL 3    Naproxen Sodium 220 MG CAPS Take 1 tablet by mouth 2 times daily as needed for Pain      Coenzyme Q10 (COQ10 PO) Take 10 mg by mouth nightly      Garlic 10 MG CAPS Take 1 capsule by mouth daily Pt unsure of dose. States it changes because he gets whatever is on sale.       Multiple Vitamins-Minerals (THERAPEUTIC MULTIVITAMIN-MINERALS) tablet Take 1 tablet by mouth daily      meclizine (ANTIVERT) 12.5 MG tablet Take 12.5 mg by mouth 3 times daily as needed      nitroGLYCERIN (NITROSTAT) 0.4 MG SL tablet Place 0.4 mg under the tongue every 5 minutes as needed for Chest pain       No current facility-administered medications for this visit. Allergies: Patient has no known allergies. Review of Systems  Review of Systems   Constitutional: Positive for malaise/fatigue. Negative for chills and fever. Eyes: Negative for blurred vision. Respiratory: Positive for shortness of breath. Negative for cough. Cardiovascular: Positive for leg swelling. Negative for chest pain, palpitations, orthopnea and PND. Gastrointestinal: Negative for blood in stool, heartburn, nausea and vomiting. Musculoskeletal: Negative for falls and myalgias. Skin: Negative for rash. Neurological: Negative for dizziness, sensory change, speech change, focal weakness and headaches. Endo/Heme/Allergies: Does not bruise/bleed easily. Psychiatric/Behavioral: Negative for depression. The patient is not nervous/anxious. Objective  Vital Signs - /76   Pulse 66   Resp 18   Ht 5' 7\" (1.702 m)   Wt 227 lb (103 kg)   SpO2 99%   BMI 35.55 kg/m²   Physical Exam   Constitutional: He is oriented to person, place, and time. He appears well-developed and well-nourished. No distress. Obese, advanced age   HENT:   Head: Normocephalic and atraumatic. Eyes: Pupils are equal, round, and reactive to light. Right eye exhibits no discharge. Left eye exhibits no discharge. Neck: No JVD present. No tracheal deviation present. Cardiovascular: Normal rate, regular rhythm, normal heart sounds and intact distal pulses. Exam reveals no gallop and no friction rub. No murmur heard. No carotid bruit   Pulmonary/Chest: Effort normal and breath sounds normal. No respiratory distress. He has no wheezes. He has no rales.

## 2018-07-25 NOTE — PATIENT INSTRUCTIONS
Followup With SHRUTI 6mo  Send Carelink 10/25/18    - Weigh daily and report weight gain of 3lbs or more in 24hrs or 5lbs in one week. - Call for increasing shortness of breath or increasing swelling in feet and legs. (This could mean you are retaining too much fluid)  - 2000mg low sodium diet  - Fluid restriction of 1500ml per day (about 6 cups of fluid per day)      Patient Education        Heart Failure: Care Instructions  Your Care Instructions    Heart failure occurs when your heart does not pump as much blood as the body needs. Failure does not mean that the heart has stopped pumping but rather that it is not pumping as well as it should. Over time, this causes fluid buildup in your lungs and other parts of your body. Fluid buildup can cause shortness of breath, fatigue, swollen ankles, and other problems. By taking medicines regularly, reducing sodium (salt) in your diet, checking your weight every day, and making lifestyle changes, you can feel better and live longer. Follow-up care is a key part of your treatment and safety. Be sure to make and go to all appointments, and call your doctor if you are having problems. It's also a good idea to know your test results and keep a list of the medicines you take. How can you care for yourself at home? Medicines    · Be safe with medicines. Take your medicines exactly as prescribed. Call your doctor if you think you are having a problem with your medicine.     · Do not take any vitamins, over-the-counter medicine, or herbal products without talking to your doctor first. Ana Lilia Oneilaj not take ibuprofen (Advil or Motrin) and naproxen (Aleve) without talking to your doctor first. They could make your heart failure worse.     · You may be taking some of the following medicine. ¨ Beta-blockers can slow heart rate, decrease blood pressure, and improve your condition. Taking a beta-blocker may lower your chance of needing to be hospitalized.   ¨ Angiotensin-converting enzyme inhibitors (ACEIs) reduce the heart's workload, lower blood pressure, and reduce swelling. Taking an ACEI may lower your chance of needing to be hospitalized again. ¨ Angiotensin II receptor blockers (ARBs) work like ACEIs. Your doctor may prescribe them instead of ACEIs. ¨ Diuretics, also called water pills, reduce swelling. ¨ Potassium supplements replace this important mineral, which is sometimes lost with diuretics. ¨ Aspirin and other blood thinners prevent blood clots, which can cause a stroke or heart attack.    You will get more details on the specific medicines your doctor prescribes. Diet    · Your doctor may suggest that you limit sodium to 2,000 milligrams (mg) a day or less. That is less than 1 teaspoon of salt a day, including all the salt you eat in cooking or in packaged foods. People get most of their sodium from processed foods. Fast food and restaurant meals also tend to be very high in sodium.     · Ask your doctor how much liquid you can drink each day. You may have to limit liquids.    Weight    · Weigh yourself without clothing at the same time each day. Record your weight. Call your doctor if you have a sudden weight gain, such as more than 2 to 3 pounds in a day or 5 pounds in a week. (Your doctor may suggest a different range of weight gain.) A sudden weight gain may mean that your heart failure is getting worse.    Activity level    · Start light exercise (if your doctor says it is okay). Even if you can only do a small amount, exercise will help you get stronger, have more energy, and manage your weight and your stress. Walking is an easy way to get exercise. Start out by walking a little more than you did before. Bit by bit, increase the amount you walk.     · When you exercise, watch for signs that your heart is working too hard. You are pushing yourself too hard if you cannot talk while you are exercising.  If you become short of breath or dizzy or have chest pain, stop, sit down, and rest.     · If you feel \"wiped out\" the day after you exercise, walk slower or for a shorter distance until you can work up to a better pace.     · Get enough rest at night. Sleeping with 1 or 2 pillows under your upper body and head may help you breathe easier.    Lifestyle changes    · Do not smoke. Smoking can make a heart condition worse. If you need help quitting, talk to your doctor about stop-smoking programs and medicines. These can increase your chances of quitting for good. Quitting smoking may be the most important step you can take to protect your heart.     · Limit alcohol to 2 drinks a day for men and 1 drink a day for women. Too much alcohol can cause health problems.     · Avoid getting sick from colds and the flu. Get a pneumococcal vaccine shot. If you have had one before, ask your doctor whether you need another dose. Get a flu shot each year. If you must be around people with colds or the flu, wash your hands often. When should you call for help? Call 911 if you have symptoms of sudden heart failure such as:    · You have severe trouble breathing.     · You cough up pink, foamy mucus.     · You have a new irregular or rapid heartbeat.    Call your doctor now or seek immediate medical care if:    · You have new or increased shortness of breath.     · You are dizzy or lightheaded, or you feel like you may faint.     · You have sudden weight gain, such as more than 2 to 3 pounds in a day or 5 pounds in a week. (Your doctor may suggest a different range of weight gain.)     · You have increased swelling in your legs, ankles, or feet.     · You are suddenly so tired or weak that you cannot do your usual activities.    Watch closely for changes in your health, and be sure to contact your doctor if you develop new symptoms. Where can you learn more? Go to [email or web address].com. org and sign in to your [employer] account.  Enter G367 in the mobicanvas box to learn more about \"Heart Failure: Care Instructions. \"     If you do not have an account, please click on the \"Sign Up Now\" link. Current as of: May 10, 2017  Content Version: 11.6  © 9388-5734 [employer]. Care instructions adapted under license by [facility] ([facility]). If you have questions about a medical condition or this instruction, always ask your healthcare professional. [address] any warranty or liability for your use of this information.

## 2018-08-07 ENCOUNTER — OFFICE VISIT (OUTPATIENT)
Dept: GASTROENTEROLOGY | Facility: CLINIC | Age: 83
End: 2018-08-07

## 2018-08-07 VITALS
WEIGHT: 228 LBS | BODY MASS INDEX: 34.56 KG/M2 | OXYGEN SATURATION: 98 % | TEMPERATURE: 98 F | HEIGHT: 68 IN | DIASTOLIC BLOOD PRESSURE: 76 MMHG | HEART RATE: 62 BPM | SYSTOLIC BLOOD PRESSURE: 130 MMHG

## 2018-08-07 DIAGNOSIS — K21.9 GASTROESOPHAGEAL REFLUX DISEASE, ESOPHAGITIS PRESENCE NOT SPECIFIED: Primary | ICD-10-CM

## 2018-08-07 DIAGNOSIS — Z86.010 HISTORY OF ADENOMATOUS POLYP OF COLON: ICD-10-CM

## 2018-08-07 PROCEDURE — 99213 OFFICE O/P EST LOW 20 MIN: CPT | Performed by: NURSE PRACTITIONER

## 2018-08-07 RX ORDER — ISOSORBIDE DINITRATE 5 MG/1
5 TABLET ORAL 3 TIMES DAILY
COMMUNITY

## 2018-08-07 RX ORDER — MECLIZINE HCL 12.5 MG/1
12.5 TABLET ORAL 3 TIMES DAILY PRN
COMMUNITY

## 2018-08-07 RX ORDER — NAPROXEN SODIUM 220 MG
220 TABLET ORAL 2 TIMES DAILY PRN
COMMUNITY

## 2018-08-07 RX ORDER — POLYETHYLENE GLYCOL 3350 17 G/17G
17 POWDER, FOR SOLUTION ORAL DAILY
COMMUNITY

## 2018-08-07 NOTE — PROGRESS NOTES
Chief Complaint   Patient presents with   • Colonoscopy     2011 had colon not having any problems       Subjective     HPI     Pt presents for routine check up.  He denies abdominal pain, no changes in bowels, no rectal bleeding or melena.  No weight loss.  Occasional constipation that is relieved with Miralax.  Heartburn several nights per week, relieved with Tums.  No dysphagia.    CScope (Dr Swan) 2011 tubulovillous polyp - transverse colon, adenomatous tubular polyp with moderate atypia- mid ascending colon, adenomatous polyp-rectum    Past Medical History:   Diagnosis Date   • Diabetes mellitus (CMS/HCC)    • Hyperlipidemia    • Hypertension        Past Surgical History:   Procedure Laterality Date   • COLON SURGERY     • COLONOSCOPY  10/27/2011    multiple polyps  left-sided diverticulosis   • CORONARY ARTERY BYPASS GRAFT     • PROSTATE SURGERY         Outpatient Prescriptions Marked as Taking for the 8/7/18 encounter (Office Visit) with Franco Rudolph APRN   Medication Sig Dispense Refill   • amLODIPine (NORVASC) 5 MG tablet TAKE 1 TABLET DAILY (MUST KEEP FOLLOW UP FOR ADDITIONAL REFILLS)     • atorvastatin (LIPITOR) 10 MG tablet TAKE 1 TABLET DAILY AT BEDTIME     • carvedilol (COREG) 3.125 MG tablet TAKE 1 TABLET TWICE A DAY     • clopidogrel (PLAVIX) 75 MG tablet TAKE 1 TABLET DAILY     • Coenzyme Q10 10 MG capsule Take 10 mg by mouth.     • ezetimibe (ZETIA) 10 MG tablet TAKE 1 TABLET DAILY     • furosemide (LASIX) 40 MG tablet TAKE 1 TABLET DAILY     • Garlic 10 MG capsule Take  by mouth.     • Insulin Lispro (HUMALOG) 100 UNIT/ML solution cartridge Inject  under the skin into the appropriate area as directed.     • isosorbide dinitrate (ISORDIL) 5 MG tablet Take 5 mg by mouth 3 (Three) Times a Day.     • meclizine (ANTIVERT) 12.5 MG tablet Take 12.5 mg by mouth 3 (Three) Times a Day As Needed for dizziness.     • naproxen sodium (ALEVE) 220 MG tablet Take 220 mg by mouth 2 (Two) Times a Day As  "Needed.     • polyethylene glycol (MIRALAX) packet Take 17 g by mouth Daily.     • spironolactone (ALDACTONE) 25 MG tablet TAKE 1 TABLET DAILY     • timolol (TIMOPTIC) 0.5 % ophthalmic solution          No Known Allergies    Social History     Social History   • Marital status:      Spouse name: N/A   • Number of children: N/A   • Years of education: N/A     Occupational History   • Not on file.     Social History Main Topics   • Smoking status: Never Smoker   • Smokeless tobacco: Never Used   • Alcohol use No   • Drug use: No   • Sexual activity: Not on file     Other Topics Concern   • Not on file     Social History Narrative   • No narrative on file       Family History   Problem Relation Age of Onset   • Colon cancer Neg Hx    • Colon polyps Neg Hx        Review of Systems   Constitutional: Negative for fatigue, fever and unexpected weight change.   HENT: Negative for hearing loss, sore throat and voice change.    Eyes: Negative for visual disturbance.   Respiratory: Negative for cough, shortness of breath and wheezing.    Cardiovascular: Negative for chest pain and palpitations.   Gastrointestinal: Negative for abdominal pain, blood in stool and vomiting.   Endocrine: Negative for polydipsia and polyuria.   Genitourinary: Negative for difficulty urinating, dysuria, hematuria and urgency.   Musculoskeletal: Negative for joint swelling and myalgias.   Skin: Negative for color change, rash and wound.   Neurological: Negative for dizziness, tremors, seizures and syncope.   Hematological: Does not bruise/bleed easily.   Psychiatric/Behavioral: Negative for agitation and confusion. The patient is not nervous/anxious.        Objective     Vitals:    08/07/18 1014   BP: 130/76   Pulse: 62   Temp: 98 °F (36.7 °C)   SpO2: 98%   Weight: 103 kg (228 lb)   Height: 172.7 cm (68\")     Body mass index is 34.67 kg/m².    Physical Exam   Constitutional: He is oriented to person, place, and time. He appears well-developed " and well-nourished. He is cooperative.   HENT:   Head: Normocephalic and atraumatic.   Eyes: Pupils are equal, round, and reactive to light. Conjunctivae are normal. No scleral icterus.   Neck: Normal range of motion. Neck supple. No JVD present. No thyroid mass and no thyromegaly present.   Cardiovascular: Normal rate, regular rhythm and normal heart sounds.  Exam reveals no gallop and no friction rub.    No murmur heard.  Pulmonary/Chest: Effort normal and breath sounds normal. No accessory muscle usage. No respiratory distress. He has no wheezes. He has no rales.   Abdominal: Soft. Normal appearance and bowel sounds are normal. He exhibits no distension, no ascites and no mass. There is no hepatosplenomegaly. There is no tenderness. There is no rebound and no guarding.   Musculoskeletal: Normal range of motion. He exhibits no edema or tenderness.     Vascular Status -  His right foot exhibits normal foot vasculature  and no edema. His left foot exhibits normal foot vasculature  and no edema.  Lymphadenopathy:     He has no cervical adenopathy.   Neurological: He is alert and oriented to person, place, and time. He has normal strength. Gait normal.   Skin: Skin is warm, dry and intact. No rash noted.       Imaging Results (most recent)     None          Body mass index is 34.67 kg/m².    Assessment/Plan     Andrea was seen today for colonoscopy.    Diagnoses and all orders for this visit:    Gastroesophageal reflux disease, esophagitis presence not specified    History of adenomatous polyp of colon    Offered to prescribe Protonix to help with GERD, pt stated he wanted to continue treatment with Tums.  If he continues to experience problems or if they worsen I advised his son to call office and a prescription could be sent in for him    No plans to proceed with cscope in light of pt age/cardiac hx.  I feel benefits do not out weigh risk.  Not proceeding with cscope discussed with pt/son, they were agreeable.    *  Surgery not found *    Patient's Body mass index is 34.67 kg/m². BMI is above normal parameters. Recommendations include: no follow-up required.      There are no Patient Instructions on file for this visit.

## 2018-08-14 ENCOUNTER — TELEPHONE (OUTPATIENT)
Dept: CARDIOLOGY | Age: 83
End: 2018-08-14

## 2018-08-14 NOTE — TELEPHONE ENCOUNTER
LM for Livia that the only 2d Echo report we have is from 10/3/16 and was ordered by Dr Summer Mcgregor so we cannot release that. Didn't know if she was looking for a more recent echo or this one.

## 2018-08-15 ENCOUNTER — OFFICE VISIT (OUTPATIENT)
Dept: UROLOGY | Age: 83
End: 2018-08-15
Payer: MEDICARE

## 2018-08-15 VITALS
SYSTOLIC BLOOD PRESSURE: 137 MMHG | BODY MASS INDEX: 33.47 KG/M2 | DIASTOLIC BLOOD PRESSURE: 69 MMHG | HEIGHT: 69 IN | HEART RATE: 75 BPM | WEIGHT: 226 LBS | TEMPERATURE: 96.8 F

## 2018-08-15 DIAGNOSIS — Z85.46 HISTORY OF PROSTATE CANCER: Primary | ICD-10-CM

## 2018-08-15 DIAGNOSIS — N39.3 MALE STRESS INCONTINENCE: ICD-10-CM

## 2018-08-15 LAB
BILIRUBIN, POC: NORMAL
BLOOD URINE, POC: NORMAL
CLARITY, POC: NORMAL
COLOR, POC: NORMAL
GLUCOSE URINE, POC: NORMAL
KETONES, POC: NORMAL
LEUKOCYTE EST, POC: NORMAL
NITRITE, POC: NORMAL
PH, POC: 5
PROTEIN, POC: NORMAL
SPECIFIC GRAVITY, POC: 1.02
UROBILINOGEN, POC: 0.2

## 2018-08-15 PROCEDURE — 1123F ACP DISCUSS/DSCN MKR DOCD: CPT | Performed by: PHYSICIAN ASSISTANT

## 2018-08-15 PROCEDURE — 99202 OFFICE O/P NEW SF 15 MIN: CPT | Performed by: PHYSICIAN ASSISTANT

## 2018-08-15 PROCEDURE — G8598 ASA/ANTIPLAT THER USED: HCPCS | Performed by: PHYSICIAN ASSISTANT

## 2018-08-15 PROCEDURE — G8427 DOCREV CUR MEDS BY ELIG CLIN: HCPCS | Performed by: PHYSICIAN ASSISTANT

## 2018-08-15 PROCEDURE — 1101F PT FALLS ASSESS-DOCD LE1/YR: CPT | Performed by: PHYSICIAN ASSISTANT

## 2018-08-15 PROCEDURE — G8417 CALC BMI ABV UP PARAM F/U: HCPCS | Performed by: PHYSICIAN ASSISTANT

## 2018-08-15 PROCEDURE — 4040F PNEUMOC VAC/ADMIN/RCVD: CPT | Performed by: PHYSICIAN ASSISTANT

## 2018-08-15 PROCEDURE — 81003 URINALYSIS AUTO W/O SCOPE: CPT | Performed by: PHYSICIAN ASSISTANT

## 2018-08-15 PROCEDURE — 1036F TOBACCO NON-USER: CPT | Performed by: PHYSICIAN ASSISTANT

## 2018-08-15 ASSESSMENT — ENCOUNTER SYMPTOMS
BLURRED VISION: 0
ABDOMINAL PAIN: 0
BACK PAIN: 0
EYE PAIN: 0
SHORTNESS OF BREATH: 0
VOMITING: 0
WHEEZING: 0
SINUS PAIN: 0

## 2018-08-15 NOTE — PROGRESS NOTES
Joelle Payne is a 80 y.o. male who presents today   Chief Complaint   Patient presents with    New Patient     im here today to establish a dr  and get clearance for dept of energy , pt having incontience      HPI: History of prostate cancer/incontinence:  Patient is a 29-year-old gentleman here to establish with urology. Patient had worked at [employer] for 38 years according to the patient. He is accompanied by his son. He has a remote history of prostate cancer and had a prostatectomy which according to the patient and his son was done in the early 80s. There are no records available to review they are not aware of what the pathology was at the initial diagnosis. They believe Dr. Carlos Landon at the old urology group was the physician that he saw at that time. He gets his PSA checked on an annual basis by Dr. Elba Talbot. Apparently she's not had any other form of treatment for the prostate cancer other than the prostatectomy. His last PSA was done in January 2018 in the was 1.21. He has no urinary symptoms except for chronic continued incontinence. Patient's son says that his pull-ups are wet most the time patient is not bothered by the symptoms. He states of the urine just leaks out without him really being aware of it. This is occurred for years.     Past Medical History:   Diagnosis Date    Arthritis     CAD (coronary artery disease)     Cancer ([address]     CHF (congestive heart failure) (HCC)     CKD (chronic kidney disease) stage 3, GFR 30-59 ml/min 5/10/2016    Colon cancer ([address]     Diabetes mellitus (HCC)     Diastolic heart failure ([address]     H/O partial resection of colon     Hypertension     Pacemaker     Prostate CA ([address]     Pure hypercholesterolemia 5/10/2016    Seizures (HCC)     Sick sinus syndrome ([address]     Skin cancer        Past Surgical History:   Procedure Laterality Date    CARDIAC SURGERY      CHOLECYSTECTOMY      COLECTOMY      COLON SURGERY         Current Outpatient Prescriptions Medication Sig Dispense Refill    polyethylene glycol (GLYCOLAX) powder Take 17 g by mouth daily 1 Bottle 5    amLODIPine (NORVASC) 5 MG tablet TAKE 1 TABLET DAILY (MUST KEEP FOLLOW UP FOR ADDITIONAL REFILLS) 90 tablet 1    atorvastatin (LIPITOR) 10 MG tablet TAKE 1 TABLET DAILY AT BEDTIME 90 tablet 1    furosemide (LASIX) 40 MG tablet TAKE 1 TABLET DAILY 90 tablet 1    carvedilol (COREG) 3.125 MG tablet TAKE 1 TABLET TWICE A  tablet 1    ezetimibe (ZETIA) 10 MG tablet TAKE 1 TABLET DAILY 90 tablet 1    isosorbide dinitrate (ISORDIL) 5 MG tablet TAKE 3 TABLETS TWICE A  tablet 2    clopidogrel (PLAVIX) 75 MG tablet TAKE 1 TABLET DAILY 90 tablet 3    insulin lispro (HUMALOG) 100 UNIT/ML injection vial As directed per sliding scale 6 vial 3    timolol (TIMOPTIC) 0.5 % ophthalmic solution       spironolactone (ALDACTONE) 25 MG tablet TAKE 1 TABLET DAILY 90 tablet 3    LANTUS 100 UNIT/ML injection vial INJECT 40 UNITS UNDER THE SKIN AT BEDTIME 50 mL 3    Naproxen Sodium 220 MG CAPS Take 1 tablet by mouth 2 times daily as needed for Pain      Coenzyme Q10 (COQ10 PO) Take 10 mg by mouth nightly      Garlic 10 MG CAPS Take 1 capsule by mouth daily Pt unsure of dose. States it changes because he gets whatever is on sale.  Multiple Vitamins-Minerals (THERAPEUTIC MULTIVITAMIN-MINERALS) tablet Take 1 tablet by mouth daily      meclizine (ANTIVERT) 12.5 MG tablet Take 12.5 mg by mouth 3 times daily as needed      nitroGLYCERIN (NITROSTAT) 0.4 MG SL tablet Place 0.4 mg under the tongue every 5 minutes as needed for Chest pain      citalopram (CELEXA) 20 MG tablet Take 1 tablet by mouth daily 30 tablet 0    B-D INS SYR ULTRAFINE 1CC/31G 31G X 5/16\" 1 ML MISC        No current facility-administered medications for this visit.         No Known Allergies    Social History     Social History    Marital status:      Spouse name: Jordin santos    Number of children: N/A    Years of Component Value Date    NITRITE neg 08/15/2018    COLORU YELLOW 05/10/2016    PROTEINU neg 08/15/2018    PROTEINU Negative 05/10/2016    PHUR 5.0 08/15/2018    PHUR 7.0 05/10/2016    CLARITYU Clear 05/10/2016    SPECGRAV 1.020 08/15/2018    SPECGRAV 1.007 05/10/2016    LEUKOCYTESUR neg 08/15/2018    LEUKOCYTESUR Negative 05/10/2016    UROBILINOGEN 0.2 05/10/2016    BILIRUBINUR neg 08/15/2018    BLOODU neg 08/15/2018    BLOODU Negative 05/10/2016    GLUCOSEU neg 08/15/2018    GLUCOSEU Negative 05/10/2016           1. History of prostate cancer  History of previous prostatectomy for prostate cancer in the 1990s I do not have actual records from the old urology group. So I do not know pathology. Joan score. His rectal exam revealed an absent prostate however did not feel anything suspicious in any other areas. Follow-up in 1 year he will continue his PSA at Dr. Lemons [facility] office which will be due January 2019.    2. Male stress incontinence  Most likely related to previous prostatectomy. Patient is not thickly bothered by the symptoms and at this point wishes no treatment. Orders Placed This Encounter   Procedures    POCT Urinalysis No Micro (Auto)     No orders of the defined types were placed in this encounter. Plan:  Follow-up in 1 year.

## 2018-08-22 ENCOUNTER — TELEPHONE (OUTPATIENT)
Dept: INTERNAL MEDICINE | Age: 83
End: 2018-08-22

## 2018-08-22 NOTE — TELEPHONE ENCOUNTER
Pt's sons called stating that Dr. Terrence Olson office will not release pt's Pulmonary Function Test Results to Dr. Micah Ferguson without Dr. Aimee Mendoza permission. Is there a way we can ok to release to Dr. Micah Ferguson?

## 2018-08-24 ENCOUNTER — TELEPHONE (OUTPATIENT)
Dept: INTERNAL MEDICINE | Age: 83
End: 2018-08-24

## 2018-08-24 ENCOUNTER — APPOINTMENT (OUTPATIENT)
Dept: CT IMAGING | Age: 83
DRG: 175 | End: 2018-08-24
Payer: MEDICARE

## 2018-08-24 ENCOUNTER — HOSPITAL ENCOUNTER (INPATIENT)
Age: 83
LOS: 2 days | Discharge: HOME | DRG: 175 | End: 2018-08-26
Attending: EMERGENCY MEDICINE | Admitting: FAMILY MEDICINE
Payer: MEDICARE

## 2018-08-24 ENCOUNTER — APPOINTMENT (OUTPATIENT)
Dept: GENERAL RADIOLOGY | Age: 83
DRG: 175 | End: 2018-08-24
Payer: MEDICARE

## 2018-08-24 DIAGNOSIS — I26.99 BILATERAL PULMONARY EMBOLISM (HCC): Primary | ICD-10-CM

## 2018-08-24 LAB
ALBUMIN SERPL-MCNC: 4.2 G/DL (ref 3.5–5.2)
ALP BLD-CCNC: 76 U/L (ref 40–130)
ALT SERPL-CCNC: 10 U/L (ref 5–41)
ANION GAP SERPL CALCULATED.3IONS-SCNC: 14 MMOL/L (ref 7–19)
ANION GAP SERPL CALCULATED.3IONS-SCNC: 14 MMOL/L (ref 7–19)
APTT: 31.2 SEC (ref 26–36.2)
APTT: 90 SEC (ref 26–36.2)
AST SERPL-CCNC: 15 U/L (ref 5–40)
BASE EXCESS ARTERIAL: -2.4 MMOL/L (ref -2–2)
BASOPHILS ABSOLUTE: 0.1 K/UL (ref 0–0.2)
BASOPHILS ABSOLUTE: 0.1 K/UL (ref 0–0.2)
BASOPHILS RELATIVE PERCENT: 0.5 % (ref 0–1)
BASOPHILS RELATIVE PERCENT: 0.6 % (ref 0–1)
BILIRUB SERPL-MCNC: 0.4 MG/DL (ref 0.2–1.2)
BUN BLDV-MCNC: 30 MG/DL (ref 8–23)
BUN BLDV-MCNC: 34 MG/DL (ref 8–23)
CALCIUM SERPL-MCNC: 8.7 MG/DL (ref 8.2–9.6)
CALCIUM SERPL-MCNC: 9.2 MG/DL (ref 8.2–9.6)
CARBOXYHEMOGLOBIN ARTERIAL: 1.6 % (ref 0–5)
CHLORIDE BLD-SCNC: 98 MMOL/L (ref 98–111)
CHLORIDE BLD-SCNC: 99 MMOL/L (ref 98–111)
CO2: 21 MMOL/L (ref 22–29)
CO2: 24 MMOL/L (ref 22–29)
CREAT SERPL-MCNC: 1.5 MG/DL (ref 0.5–1.2)
CREAT SERPL-MCNC: 1.7 MG/DL (ref 0.5–1.2)
EOSINOPHILS ABSOLUTE: 0.6 K/UL (ref 0–0.6)
EOSINOPHILS ABSOLUTE: 0.7 K/UL (ref 0–0.6)
EOSINOPHILS RELATIVE PERCENT: 5.6 % (ref 0–5)
EOSINOPHILS RELATIVE PERCENT: 5.9 % (ref 0–5)
GFR NON-AFRICAN AMERICAN: 38
GFR NON-AFRICAN AMERICAN: 44
GLUCOSE BLD-MCNC: 244 MG/DL (ref 74–109)
GLUCOSE BLD-MCNC: 273 MG/DL (ref 74–109)
HCO3 ARTERIAL: 21.4 MMOL/L (ref 22–26)
HCT VFR BLD CALC: 36 % (ref 42–52)
HCT VFR BLD CALC: 37.7 % (ref 42–52)
HEMOGLOBIN, ART, EXTENDED: 12.4 G/DL (ref 14–18)
HEMOGLOBIN: 11.8 G/DL (ref 14–18)
HEMOGLOBIN: 12.3 G/DL (ref 14–18)
INR BLD: 1.01 (ref 0.88–1.18)
LYMPHOCYTES ABSOLUTE: 2.9 K/UL (ref 1.1–4.5)
LYMPHOCYTES ABSOLUTE: 3 K/UL (ref 1.1–4.5)
LYMPHOCYTES RELATIVE PERCENT: 25.3 % (ref 20–40)
LYMPHOCYTES RELATIVE PERCENT: 29.3 % (ref 20–40)
MCH RBC QN AUTO: 31.6 PG (ref 27–31)
MCH RBC QN AUTO: 31.9 PG (ref 27–31)
MCHC RBC AUTO-ENTMCNC: 32.6 G/DL (ref 33–37)
MCHC RBC AUTO-ENTMCNC: 32.8 G/DL (ref 33–37)
MCV RBC AUTO: 96.5 FL (ref 80–94)
MCV RBC AUTO: 97.7 FL (ref 80–94)
METHEMOGLOBIN ARTERIAL: 1.2 %
MONOCYTES ABSOLUTE: 0.7 K/UL (ref 0–0.9)
MONOCYTES ABSOLUTE: 0.9 K/UL (ref 0–0.9)
MONOCYTES RELATIVE PERCENT: 6.5 % (ref 0–10)
MONOCYTES RELATIVE PERCENT: 7.8 % (ref 0–10)
NEUTROPHILS ABSOLUTE: 5.9 K/UL (ref 1.5–7.5)
NEUTROPHILS ABSOLUTE: 7 K/UL (ref 1.5–7.5)
NEUTROPHILS RELATIVE PERCENT: 57.4 % (ref 50–65)
NEUTROPHILS RELATIVE PERCENT: 60.5 % (ref 50–65)
O2 CONTENT ARTERIAL: 15.6 ML/DL
O2 SAT, ARTERIAL: 89.7 %
O2 THERAPY: ABNORMAL
PCO2 ARTERIAL: 33 MMHG (ref 35–45)
PDW BLD-RTO: 12.9 % (ref 11.5–14.5)
PDW BLD-RTO: 13 % (ref 11.5–14.5)
PH ARTERIAL: 7.42 (ref 7.35–7.45)
PLATELET # BLD: 211 K/UL (ref 130–400)
PLATELET # BLD: 220 K/UL (ref 130–400)
PMV BLD AUTO: 10.6 FL (ref 9.4–12.4)
PMV BLD AUTO: 10.7 FL (ref 9.4–12.4)
PO2 ARTERIAL: 52 MMHG (ref 80–100)
POTASSIUM REFLEX MAGNESIUM: 4.2 MMOL/L (ref 3.5–5)
POTASSIUM SERPL-SCNC: 4.7 MMOL/L (ref 3.5–5)
POTASSIUM, WHOLE BLOOD: 4.2
PRO-BNP: 435 PG/ML (ref 0–1800)
PROTHROMBIN TIME: 13.2 SEC (ref 12–14.6)
RBC # BLD: 3.73 M/UL (ref 4.7–6.1)
RBC # BLD: 3.86 M/UL (ref 4.7–6.1)
SODIUM BLD-SCNC: 133 MMOL/L (ref 136–145)
SODIUM BLD-SCNC: 137 MMOL/L (ref 136–145)
TOTAL PROTEIN: 7.9 G/DL (ref 6.6–8.7)
TROPONIN: 0.01 NG/ML (ref 0–0.03)
WBC # BLD: 10.3 K/UL (ref 4.8–10.8)
WBC # BLD: 11.6 K/UL (ref 4.8–10.8)

## 2018-08-24 PROCEDURE — 80053 COMPREHEN METABOLIC PANEL: CPT

## 2018-08-24 PROCEDURE — 99285 EMERGENCY DEPT VISIT HI MDM: CPT

## 2018-08-24 PROCEDURE — 2580000003 HC RX 258: Performed by: EMERGENCY MEDICINE

## 2018-08-24 PROCEDURE — 84484 ASSAY OF TROPONIN QUANT: CPT

## 2018-08-24 PROCEDURE — 85025 COMPLETE CBC W/AUTO DIFF WBC: CPT

## 2018-08-24 PROCEDURE — 1210000000 HC MED SURG R&B

## 2018-08-24 PROCEDURE — 82803 BLOOD GASES ANY COMBINATION: CPT

## 2018-08-24 PROCEDURE — 93005 ELECTROCARDIOGRAM TRACING: CPT

## 2018-08-24 PROCEDURE — 71275 CT ANGIOGRAPHY CHEST: CPT

## 2018-08-24 PROCEDURE — 71045 X-RAY EXAM CHEST 1 VIEW: CPT

## 2018-08-24 PROCEDURE — 99222 1ST HOSP IP/OBS MODERATE 55: CPT | Performed by: HOSPITALIST

## 2018-08-24 PROCEDURE — 85610 PROTHROMBIN TIME: CPT

## 2018-08-24 PROCEDURE — 36415 COLL VENOUS BLD VENIPUNCTURE: CPT

## 2018-08-24 PROCEDURE — 36600 WITHDRAWAL OF ARTERIAL BLOOD: CPT

## 2018-08-24 PROCEDURE — 6360000004 HC RX CONTRAST MEDICATION: Performed by: EMERGENCY MEDICINE

## 2018-08-24 PROCEDURE — 84132 ASSAY OF SERUM POTASSIUM: CPT

## 2018-08-24 PROCEDURE — 83880 ASSAY OF NATRIURETIC PEPTIDE: CPT

## 2018-08-24 PROCEDURE — 6370000000 HC RX 637 (ALT 250 FOR IP): Performed by: HOSPITALIST

## 2018-08-24 PROCEDURE — 99285 EMERGENCY DEPT VISIT HI MDM: CPT | Performed by: EMERGENCY MEDICINE

## 2018-08-24 PROCEDURE — 96374 THER/PROPH/DIAG INJ IV PUSH: CPT

## 2018-08-24 PROCEDURE — 6360000002 HC RX W HCPCS: Performed by: EMERGENCY MEDICINE

## 2018-08-24 PROCEDURE — 85730 THROMBOPLASTIN TIME PARTIAL: CPT

## 2018-08-24 RX ORDER — TIMOLOL MALEATE 5 MG/ML
1 SOLUTION/ DROPS OPHTHALMIC EVERY 12 HOURS
Status: DISCONTINUED | OUTPATIENT
Start: 2018-08-24 | End: 2018-08-26 | Stop reason: HOSPADM

## 2018-08-24 RX ORDER — DEXTROSE MONOHYDRATE 50 MG/ML
100 INJECTION, SOLUTION INTRAVENOUS PRN
Status: DISCONTINUED | OUTPATIENT
Start: 2018-08-24 | End: 2018-08-26 | Stop reason: HOSPADM

## 2018-08-24 RX ORDER — HEPARIN SODIUM 1000 [USP'U]/ML
4000 INJECTION, SOLUTION INTRAVENOUS; SUBCUTANEOUS PRN
Status: DISCONTINUED | OUTPATIENT
Start: 2018-08-24 | End: 2018-08-25 | Stop reason: ALTCHOICE

## 2018-08-24 RX ORDER — INSULIN GLARGINE 100 [IU]/ML
40 INJECTION, SOLUTION SUBCUTANEOUS NIGHTLY
Status: DISCONTINUED | OUTPATIENT
Start: 2018-08-24 | End: 2018-08-26 | Stop reason: HOSPADM

## 2018-08-24 RX ORDER — HEPARIN SODIUM 10000 [USP'U]/100ML
12 INJECTION, SOLUTION INTRAVENOUS CONTINUOUS
Status: DISCONTINUED | OUTPATIENT
Start: 2018-08-24 | End: 2018-08-25 | Stop reason: ALTCHOICE

## 2018-08-24 RX ORDER — ISOSORBIDE DINITRATE 10 MG/1
5 TABLET ORAL 3 TIMES DAILY
Status: DISCONTINUED | OUTPATIENT
Start: 2018-08-24 | End: 2018-08-26 | Stop reason: HOSPADM

## 2018-08-24 RX ORDER — DEXTROSE MONOHYDRATE 25 G/50ML
12.5 INJECTION, SOLUTION INTRAVENOUS PRN
Status: DISCONTINUED | OUTPATIENT
Start: 2018-08-24 | End: 2018-08-26 | Stop reason: HOSPADM

## 2018-08-24 RX ORDER — ATORVASTATIN CALCIUM 10 MG/1
10 TABLET, FILM COATED ORAL NIGHTLY
Status: DISCONTINUED | OUTPATIENT
Start: 2018-08-24 | End: 2018-08-26 | Stop reason: HOSPADM

## 2018-08-24 RX ORDER — HEPARIN SODIUM 10000 [USP'U]/100ML
1000 INJECTION, SOLUTION INTRAVENOUS CONTINUOUS
Status: DISCONTINUED | OUTPATIENT
Start: 2018-08-24 | End: 2018-08-24

## 2018-08-24 RX ORDER — HEPARIN SODIUM 1000 [USP'U]/ML
4000 INJECTION, SOLUTION INTRAVENOUS; SUBCUTANEOUS ONCE
Status: COMPLETED | OUTPATIENT
Start: 2018-08-24 | End: 2018-08-24

## 2018-08-24 RX ORDER — CLOTRIMAZOLE 1 %
CREAM (GRAM) TOPICAL 2 TIMES DAILY
Status: DISCONTINUED | OUTPATIENT
Start: 2018-08-24 | End: 2018-08-26 | Stop reason: HOSPADM

## 2018-08-24 RX ORDER — HEPARIN SODIUM 5000 [USP'U]/ML
5000 INJECTION, SOLUTION INTRAVENOUS; SUBCUTANEOUS ONCE
Status: DISCONTINUED | OUTPATIENT
Start: 2018-08-24 | End: 2018-08-24

## 2018-08-24 RX ORDER — SODIUM CHLORIDE 0.9 % (FLUSH) 0.9 %
10 SYRINGE (ML) INJECTION PRN
Status: DISCONTINUED | OUTPATIENT
Start: 2018-08-24 | End: 2018-08-26 | Stop reason: HOSPADM

## 2018-08-24 RX ORDER — NAPROXEN 250 MG/1
250 TABLET ORAL 2 TIMES DAILY PRN
Status: DISCONTINUED | OUTPATIENT
Start: 2018-08-24 | End: 2018-08-26 | Stop reason: HOSPADM

## 2018-08-24 RX ORDER — MECLIZINE HCL 12.5 MG/1
12.5 TABLET ORAL 3 TIMES DAILY PRN
Status: DISCONTINUED | OUTPATIENT
Start: 2018-08-24 | End: 2018-08-26 | Stop reason: HOSPADM

## 2018-08-24 RX ORDER — SPIRONOLACTONE 25 MG/1
25 TABLET ORAL DAILY
Status: DISCONTINUED | OUTPATIENT
Start: 2018-08-25 | End: 2018-08-25

## 2018-08-24 RX ORDER — M-VIT,TX,IRON,MINS/CALC/FOLIC 27MG-0.4MG
1 TABLET ORAL DAILY
Status: DISCONTINUED | OUTPATIENT
Start: 2018-08-25 | End: 2018-08-26 | Stop reason: HOSPADM

## 2018-08-24 RX ORDER — HEPARIN SODIUM 1000 [USP'U]/ML
2000 INJECTION, SOLUTION INTRAVENOUS; SUBCUTANEOUS PRN
Status: DISCONTINUED | OUTPATIENT
Start: 2018-08-24 | End: 2018-08-25 | Stop reason: ALTCHOICE

## 2018-08-24 RX ORDER — FUROSEMIDE 40 MG/1
40 TABLET ORAL DAILY
Status: DISCONTINUED | OUTPATIENT
Start: 2018-08-25 | End: 2018-08-25

## 2018-08-24 RX ORDER — NITROGLYCERIN 0.4 MG/1
0.4 TABLET SUBLINGUAL EVERY 5 MIN PRN
Status: DISCONTINUED | OUTPATIENT
Start: 2018-08-24 | End: 2018-08-26 | Stop reason: HOSPADM

## 2018-08-24 RX ORDER — HEPARIN SODIUM 1000 [USP'U]/ML
60 INJECTION, SOLUTION INTRAVENOUS; SUBCUTANEOUS PRN
Status: DISCONTINUED | OUTPATIENT
Start: 2018-08-24 | End: 2018-08-24

## 2018-08-24 RX ORDER — 0.9 % SODIUM CHLORIDE 0.9 %
250 INTRAVENOUS SOLUTION INTRAVENOUS ONCE
Status: COMPLETED | OUTPATIENT
Start: 2018-08-24 | End: 2018-08-24

## 2018-08-24 RX ORDER — POLYETHYLENE GLYCOL 3350 17 G/17G
17 POWDER, FOR SOLUTION ORAL DAILY
Status: DISCONTINUED | OUTPATIENT
Start: 2018-08-25 | End: 2018-08-26 | Stop reason: HOSPADM

## 2018-08-24 RX ORDER — NICOTINE POLACRILEX 4 MG
15 LOZENGE BUCCAL PRN
Status: DISCONTINUED | OUTPATIENT
Start: 2018-08-24 | End: 2018-08-26 | Stop reason: HOSPADM

## 2018-08-24 RX ORDER — CARVEDILOL 3.12 MG/1
3.12 TABLET ORAL 2 TIMES DAILY
Status: DISCONTINUED | OUTPATIENT
Start: 2018-08-24 | End: 2018-08-26 | Stop reason: HOSPADM

## 2018-08-24 RX ORDER — CLOPIDOGREL BISULFATE 75 MG/1
75 TABLET ORAL DAILY
Status: DISCONTINUED | OUTPATIENT
Start: 2018-08-25 | End: 2018-08-26 | Stop reason: HOSPADM

## 2018-08-24 RX ORDER — AMLODIPINE BESYLATE 5 MG/1
5 TABLET ORAL DAILY
Status: DISCONTINUED | OUTPATIENT
Start: 2018-08-25 | End: 2018-08-26 | Stop reason: HOSPADM

## 2018-08-24 RX ORDER — SODIUM CHLORIDE 0.9 % (FLUSH) 0.9 %
10 SYRINGE (ML) INJECTION EVERY 12 HOURS SCHEDULED
Status: DISCONTINUED | OUTPATIENT
Start: 2018-08-24 | End: 2018-08-26 | Stop reason: HOSPADM

## 2018-08-24 RX ADMIN — ISOSORBIDE DINITRATE 5 MG: 10 TABLET ORAL at 22:15

## 2018-08-24 RX ADMIN — HEPARIN SODIUM AND DEXTROSE 12 UNITS/KG/HR: 10000; 5 INJECTION INTRAVENOUS at 21:12

## 2018-08-24 RX ADMIN — CARVEDILOL 3.12 MG: 3.12 TABLET, FILM COATED ORAL at 22:15

## 2018-08-24 RX ADMIN — IOPAMIDOL 75 ML: 755 INJECTION, SOLUTION INTRAVENOUS at 18:05

## 2018-08-24 RX ADMIN — HEPARIN SODIUM 4000 UNITS: 1000 INJECTION, SOLUTION INTRAVENOUS; SUBCUTANEOUS at 20:33

## 2018-08-24 RX ADMIN — ATORVASTATIN CALCIUM 10 MG: 10 TABLET, FILM COATED ORAL at 22:15

## 2018-08-24 RX ADMIN — SODIUM CHLORIDE 250 ML: 9 INJECTION, SOLUTION INTRAVENOUS at 21:16

## 2018-08-24 ASSESSMENT — ENCOUNTER SYMPTOMS
RHINORRHEA: 0
SHORTNESS OF BREATH: 1
NAUSEA: 0
COUGH: 0
ABDOMINAL PAIN: 0
SORE THROAT: 0
DIARRHEA: 0
VOMITING: 0
BACK PAIN: 0

## 2018-08-24 NOTE — ED PROVIDER NOTES
(congestive heart failure) (HCC)     CKD (chronic kidney disease) stage 3, GFR 30-59 ml/min 5/10/2016    Colon cancer (HCC)     Diabetes mellitus (HCC)     Diastolic heart failure (HCC)     H/O partial resection of colon     Hypertension     Pacemaker     Prostate CA ([facility] [address]     Pure hypercholesterolemia 5/10/2016    Seizures (HCC)     Sick sinus syndrome ([facility] [address]     Skin cancer          SURGICAL HISTORY       Past Surgical History:   Procedure Laterality Date    CARDIAC SURGERY      CHOLECYSTECTOMY      COLECTOMY      COLON SURGERY           CURRENT MEDICATIONS       Previous Medications    AMLODIPINE (NORVASC) 5 MG TABLET    TAKE 1 TABLET DAILY (MUST KEEP FOLLOW UP FOR ADDITIONAL REFILLS)    ATORVASTATIN (LIPITOR) 10 MG TABLET    TAKE 1 TABLET DAILY AT BEDTIME    B-D INS SYR ULTRAFINE 1CC/31G 31G X 5/16\" 1 ML MISC        CARVEDILOL (COREG) 3.125 MG TABLET    TAKE 1 TABLET TWICE A DAY    CITALOPRAM (CELEXA) 20 MG TABLET    Take 1 tablet by mouth daily    CLOPIDOGREL (PLAVIX) 75 MG TABLET    TAKE 1 TABLET DAILY    COENZYME Q10 (COQ10 PO)    Take 10 mg by mouth nightly    EZETIMIBE (ZETIA) 10 MG TABLET    TAKE 1 TABLET DAILY    FUROSEMIDE (LASIX) 40 MG TABLET    TAKE 1 TABLET DAILY    GARLIC 10 MG CAPS    Take 1 capsule by mouth daily Pt unsure of dose. States it changes because he gets whatever is on sale.     INSULIN LISPRO (HUMALOG) 100 UNIT/ML INJECTION VIAL    As directed per sliding scale    ISOSORBIDE DINITRATE (ISORDIL) 5 MG TABLET    TAKE 3 TABLETS TWICE A DAY    LANTUS 100 UNIT/ML INJECTION VIAL    INJECT 40 UNITS UNDER THE SKIN AT BEDTIME    MECLIZINE (ANTIVERT) 12.5 MG TABLET    Take 12.5 mg by mouth 3 times daily as needed    MULTIPLE VITAMINS-MINERALS (THERAPEUTIC MULTIVITAMIN-MINERALS) TABLET    Take 1 tablet by mouth daily    NAPROXEN SODIUM 220 MG CAPS    Take 1 tablet by mouth 2 times daily as needed for Pain    NITROGLYCERIN (NITROSTAT) 0.4 MG SL TABLET    Place 0.4 mg under the tongue left lower field. Abdominal: Soft. There is no tenderness. There is no rebound and no guarding. Musculoskeletal: Normal range of motion. He exhibits edema. Bilateral pitting edema 1+     No calf tenderness   Neurological: He is alert and oriented to person, place, and time. Skin: Skin is warm and dry. He is not diaphoretic. Vitals reviewed. DIAGNOSTIC RESULTS     EKG: All EKG's are interpreted by the Emergency Department Physician who either signs or Co-signs this chart in the absence of a cardiologist.    63, normal sinus rhythm, ventricular paced complexes, nondiagnostic EKG    RADIOLOGY:   Non-plain film images such as CT, Ultrasound and MRI are read by the radiologist. Plain radiographic images are visualized and preliminarily interpreted by the emergency physician with the below findings:          CTA PULMONARY W CONTRAST   Final Result   1. Bilateral pulmonary emboli with moderate clot burden. 2. No right heart enlargement. 3. Chronic lung changes with no acute infiltrate. Signed by Dr Cody Toro on 8/24/2018 6:42 PM      XR CHEST PORTABLE   Final Result   Impression:   No acute cardiopulmonary disease.    Signed by Dr Sanjay Davis on 8/24/2018 5:06 PM              LABS:  Labs Reviewed   CBC WITH AUTO DIFFERENTIAL - Abnormal; Notable for the following:        Result Value    WBC 11.6 (*)     RBC 3.86 (*)     Hemoglobin 12.3 (*)     Hematocrit 37.7 (*)     MCV 97.7 (*)     MCH 31.9 (*)     MCHC 32.6 (*)     Eosinophils % 5.6 (*)     Eosinophils # 0.70 (*)     All other components within normal limits   COMPREHENSIVE METABOLIC PANEL - Abnormal; Notable for the following:     Glucose 244 (*)     BUN 34 (*)     CREATININE 1.7 (*)     GFR Non- 38 (*)     All other components within normal limits   BLOOD GAS, ARTERIAL - Abnormal; Notable for the following:     pCO2, Arterial 33.0 (*)     pO2, Arterial 52.0 (*)     HCO3, Arterial 21.4 (*)     Base Excess, Arterial -2.4

## 2018-08-25 ENCOUNTER — APPOINTMENT (OUTPATIENT)
Dept: ULTRASOUND IMAGING | Age: 83
DRG: 175 | End: 2018-08-25
Payer: MEDICARE

## 2018-08-25 PROBLEM — Z79.4 TYPE 2 DIABETES MELLITUS, WITH LONG-TERM CURRENT USE OF INSULIN (HCC): Status: ACTIVE | Noted: 2018-08-25

## 2018-08-25 PROBLEM — E11.9 TYPE 2 DIABETES MELLITUS, WITH LONG-TERM CURRENT USE OF INSULIN (HCC): Status: ACTIVE | Noted: 2018-08-25

## 2018-08-25 LAB
APTT: 71.2 SEC (ref 26–36.2)
APTT: 84 SEC (ref 26–36.2)
APTT: 89.9 SEC (ref 26–36.2)
GLUCOSE BLD-MCNC: 248 MG/DL (ref 70–99)
GLUCOSE BLD-MCNC: 337 MG/DL (ref 70–99)
GLUCOSE BLD-MCNC: 351 MG/DL (ref 70–99)
GLUCOSE BLD-MCNC: 361 MG/DL (ref 70–99)
PERFORMED ON: ABNORMAL
VITAMIN D 25-HYDROXY: 30.9 NG/ML

## 2018-08-25 PROCEDURE — 82306 VITAMIN D 25 HYDROXY: CPT

## 2018-08-25 PROCEDURE — 6370000000 HC RX 637 (ALT 250 FOR IP): Performed by: HOSPITALIST

## 2018-08-25 PROCEDURE — 99233 SBSQ HOSP IP/OBS HIGH 50: CPT | Performed by: FAMILY MEDICINE

## 2018-08-25 PROCEDURE — 6370000000 HC RX 637 (ALT 250 FOR IP): Performed by: FAMILY MEDICINE

## 2018-08-25 PROCEDURE — 36415 COLL VENOUS BLD VENIPUNCTURE: CPT

## 2018-08-25 PROCEDURE — 85730 THROMBOPLASTIN TIME PARTIAL: CPT

## 2018-08-25 PROCEDURE — 1210000000 HC MED SURG R&B

## 2018-08-25 PROCEDURE — 2580000003 HC RX 258: Performed by: HOSPITALIST

## 2018-08-25 PROCEDURE — 82948 REAGENT STRIP/BLOOD GLUCOSE: CPT

## 2018-08-25 PROCEDURE — 2580000003 HC RX 258: Performed by: INTERNAL MEDICINE

## 2018-08-25 RX ORDER — SODIUM CHLORIDE 9 MG/ML
INJECTION, SOLUTION INTRAVENOUS CONTINUOUS
Status: DISCONTINUED | OUTPATIENT
Start: 2018-08-25 | End: 2018-08-26 | Stop reason: HOSPADM

## 2018-08-25 RX ADMIN — APIXABAN 10 MG: 2.5 TABLET, FILM COATED ORAL at 20:24

## 2018-08-25 RX ADMIN — AMLODIPINE BESYLATE 5 MG: 5 TABLET ORAL at 09:32

## 2018-08-25 RX ADMIN — INSULIN GLARGINE 40 UNITS: 100 INJECTION, SOLUTION SUBCUTANEOUS at 00:58

## 2018-08-25 RX ADMIN — Medication 10 ML: at 20:25

## 2018-08-25 RX ADMIN — POLYETHYLENE GLYCOL 3350 17 G: 17 POWDER, FOR SOLUTION ORAL at 09:32

## 2018-08-25 RX ADMIN — MULTIPLE VITAMINS W/ MINERALS TAB 1 TABLET: TAB at 09:32

## 2018-08-25 RX ADMIN — SODIUM CHLORIDE: 9 INJECTION, SOLUTION INTRAVENOUS at 17:00

## 2018-08-25 RX ADMIN — CLOPIDOGREL BISULFATE 75 MG: 75 TABLET ORAL at 09:32

## 2018-08-25 RX ADMIN — ISOSORBIDE DINITRATE 5 MG: 10 TABLET ORAL at 13:06

## 2018-08-25 RX ADMIN — ATORVASTATIN CALCIUM 10 MG: 10 TABLET, FILM COATED ORAL at 20:25

## 2018-08-25 RX ADMIN — ISOSORBIDE DINITRATE 5 MG: 10 TABLET ORAL at 09:35

## 2018-08-25 RX ADMIN — CLOTRIMAZOLE: 10 CREAM TOPICAL at 09:32

## 2018-08-25 RX ADMIN — TIMOLOL MALEATE 1 DROP: 5 SOLUTION OPHTHALMIC at 09:32

## 2018-08-25 RX ADMIN — INSULIN GLARGINE 40 UNITS: 100 INJECTION, SOLUTION SUBCUTANEOUS at 20:30

## 2018-08-25 RX ADMIN — CARVEDILOL 3.12 MG: 3.12 TABLET, FILM COATED ORAL at 09:32

## 2018-08-25 RX ADMIN — TIMOLOL MALEATE 1 DROP: 5 SOLUTION OPHTHALMIC at 20:31

## 2018-08-25 RX ADMIN — SPIRONOLACTONE 25 MG: 25 TABLET ORAL at 09:32

## 2018-08-25 RX ADMIN — FUROSEMIDE 40 MG: 40 TABLET ORAL at 09:32

## 2018-08-25 RX ADMIN — ISOSORBIDE DINITRATE 5 MG: 10 TABLET ORAL at 20:25

## 2018-08-25 RX ADMIN — CARVEDILOL 3.12 MG: 3.12 TABLET, FILM COATED ORAL at 20:25

## 2018-08-25 RX ADMIN — APIXABAN 10 MG: 2.5 TABLET, FILM COATED ORAL at 13:06

## 2018-08-25 NOTE — ED NOTES
Delay in calling report, Dr. Ajay Mcdonald, hospitalist at patient bedside       Kandy Butt RN  08/24/18 2130

## 2018-08-25 NOTE — PLAN OF CARE
Problem: Falls - Risk of:  Goal: Will remain free from falls  Will remain free from falls   Outcome: Met This Shift    Goal: Absence of physical injury  Absence of physical injury   Outcome: Met This Shift      Problem: Risk for Impaired Skin Integrity  Goal: Tissue integrity - skin and mucous membranes  Structural intactness and normal physiological function of skin and  mucous membranes.    Outcome: Ongoing

## 2018-08-25 NOTE — CONSULTS
HEENT: Normocephalic atraumatic. Neck: Supple with no JVD or carotid bruits. Chest:  clear to auscultation bilaterally without respiratory distress  CVS: regular rate and rhythm  Abdominal: soft, nontender, normal bowel sounds abdominal obesity. Extremities: no cyanosis or edema  Skin: warm and dry without rash      Labs:  BMP:                 08/24/18 08/24/18 08/24/18                       1643          1645          2247          NA            --          137          133*          K            4.2          4.7          4.2           CL            --          99           98            CO2           --          24           21*           BUN           --          34*          30*           CREATININE    --          1.7*         1.5*          CALCIUM       --          9.2          8.7           CBC:                 08/24/18 08/24/18                       1645          2247          WBC          11.6*        10.3          HGB          12.3*        11.8*         HCT          37.7*        36.0*         MCV          97.7*        96.5*         PLT          220          211           LIVER PROFILE:                 08/24/18                       1645          AST          15            ALT          10            BILITOT      0.4           ALKPHOS      76            PT/INR:                 08/24/18                       1645          PROTIME      13.2          INR          1.01          APTT:                 08/25/18 08/25/18 08/25/18                       0414          1000          1508          APTT         89.9*        84.0*        71.2*         BNP:  No results for input(s): BNP in the last 72 hours. Ionized Calcium:No results for input(s): IONCA in the last 72 hours. Magnesium:No results for input(s): MG in the last 72 hours. Phosphorus:No results for input(s): PHOS in the last 72 hours. HgbA1C: No results for input(s): LABA1C in the last 72 hours.   Hepatic:                 08/24/18

## 2018-08-25 NOTE — PROGRESS NOTES
Hospitalist Progress Note  8/25/2018 11:36 AM  Subjective:   Admit Date: 8/24/2018  PCP: Carolin Tran MD    Chief Complaint: shortness of breath    Subjective: Patient states he is feeling markedly better today. Denies new complaints. Discussed treatment options and patient prefers DOAC to warfarin for long-term treatment. History is otherwise unchanged. Cumulative Hospital History:   8-24: Admitted for bilateral pulmonary emboli. SNF resident, no recent travel or immobility, but is sedentary. On heparin drip.  8-25: Chooses DOAC for chronic treatment. Due to chronic renal failure, apixaban chosen. ROS: 14 point review of systems is negative except as specifically addressed above.     DIET CARDIAC; Carb Control: 3 carb choices (45 gms)/meal; Daily Fluid Restriction: 1000 ml    Intake/Output Summary (Last 24 hours) at 08/25/18 1136  Last data filed at 08/25/18 0401   Gross per 24 hour   Intake              100 ml   Output                0 ml   Net              100 ml     Medications:   heparin (porcine) 9.07 Units/kg/hr (08/25/18 1045)    dextrose       Current Facility-Administered Medications   Medication Dose Route Frequency Provider Last Rate Last Dose    apixaban (ELIQUIS) tablet 10 mg  10 mg Oral BID Buddy Marroquin DO        heparin (porcine) injection 2,000 Units  2,000 Units Intravenous PRN Hans Marmolejo MD        heparin 25,000 units in dextrose 5% 250 mL infusion  12 Units/kg/hr Intravenous Continuous Hans Marmolejo MD 9.5 mL/hr at 08/25/18 1045 9.07 Units/kg/hr at 08/25/18 1045    heparin (porcine) injection 4,000 Units  4,000 Units Intravenous PRN Anna Iniguez        sodium chloride flush 0.9 % injection 10 mL  10 mL Intravenous 2 times per day Honorio He MD        sodium chloride flush 0.9 % injection 10 mL  10 mL Intravenous PRN Honorio He MD        amLODIPine (NORVASC) tablet 5 mg  5 mg Oral Daily Honorio He MD   5 mg at 08/25/18 0932    atorvastatin (LIPITOR) tablet 10 mg  10 mg Oral Nightly Honorio He MD   10 mg at 08/24/18 2215    carvedilol (COREG) tablet 3.125 mg  3.125 mg Oral BID Honorio He MD   3.125 mg at 08/25/18 0932    furosemide (LASIX) tablet 40 mg  40 mg Oral Daily Honorio He MD   40 mg at 08/25/18 0932    isosorbide dinitrate (ISORDIL) tablet 5 mg  5 mg Oral TID Honorio He MD   5 mg at 08/25/18 0935    insulin glargine (LANTUS) injection vial 40 Units  40 Units Subcutaneous Nightly Honorio He MD   40 Units at 08/25/18 0058    meclizine (ANTIVERT) tablet 12.5 mg  12.5 mg Oral TID PRN Honorio He MD        therapeutic multivitamin-minerals 1 tablet  1 tablet Oral Daily Honorio He MD   1 tablet at 08/25/18 0932    naproxen (NAPROSYN) tablet 250 mg  250 mg Oral BID PRN Honorio He MD        nitroGLYCERIN (NITROSTAT) SL tablet 0.4 mg  0.4 mg Sublingual Q5 Min PRN Honorio He MD        polyethylene glycol [facility]) packet 17 g  17 g Oral Daily Honorio He MD   17 g at 08/25/18 0932    spironolactone (ALDACTONE) tablet 25 mg  25 mg Oral Daily Honorio He MD   25 mg at 08/25/18 0932    timolol (TIMOPTIC) 0.5 % ophthalmic solution 1 drop  1 drop Both Eyes Q12H Honorio He MD   1 drop at 08/25/18 0932    clopidogrel (PLAVIX) tablet 75 mg  75 mg Oral Daily Honorio He MD   75 mg at 08/25/18 0932    clotrimazole (LOTRIMIN) 1 % cream   Topical BID Honorio He MD        glucose (GLUTOSE) 40 % oral gel 15 g  15 g Oral PRN Honorio He MD        dextrose 50 % solution 12.5 g  12.5 g Intravenous PRN Honorio He MD        glucagon (rDNA) injection 1 mg  1 mg Intramuscular PRN Honorio He MD        dextrose 5 % solution  100 mL/hr Intravenous PRN Honorio He MD            Labs:     Recent Labs      08/24/18   1645  08/24/18   2247   WBC  11.6*  10.3   RBC  3.86*  3.73*   HGB  12.3*  11.8*   HCT  37.7*  36.0*   MCV  97.7*  96.5*   MCH  31.9*  31.6*   MCHC  32.6*  32.8*   PLT  220  211     Recent Labs      08/24/18 deficits, normal sensation, alert and oriented, affect and mood appropriate. Skin: no rashes, nodules. Assessment and Plan:   Principal Problem:    Pulmonary embolism, bilateral (HCC)  Active Problems:    Acute respiratory failure with hypoxia (HCC)    Chronic diastolic congestive heart failure (HCC)    Obesity due to excess calories    Complete heart block (HCC)    CKD (chronic kidney disease) stage 3, GFR 30-59 ml/min    Anemia in CKD (chronic kidney disease)    Diastolic heart failure (HCC)    Type 2 diabetes mellitus, with long-term current use of insulin (HCC)  Resolved Problems:    * No resolved hospital problems. *    Start the patient on apixaban at PE treatment dosing, then discontinue heparin. Nursing to monitor the patient's need for supplemental oxygen.     Advance Directive: Full Code    DVT prophylaxis: heparin    Discharge planning: Back to SNF      DO Koby Levyist

## 2018-08-25 NOTE — H&P
1 capsule by mouth daily Pt unsure of dose. States it changes because he gets whatever is on sale.  Multiple Vitamins-Minerals (THERAPEUTIC MULTIVITAMIN-MINERALS) tablet Take 1 tablet by mouth daily      meclizine (ANTIVERT) 12.5 MG tablet Take 12.5 mg by mouth 3 times daily as needed      nitroGLYCERIN (NITROSTAT) 0.4 MG SL tablet Place 0.4 mg under the tongue every 5 minutes as needed for Chest pain     Exlax 1/2 tab daily at home PRN  Also on lotrimin for rash at home      OBJECTIVE:    Upon arrival  Vitals:    08/24/18 1954   BP: (!) 169/75   Pulse: 70   Resp: 23   Temp:    SpO2: (!) 88%   (more recent recorded by me below)    Physical Exam  VSS as per moinitor in room with /83 at HR 71bpm, RR 19 with SpO2 of 93% on NC at 1 LPM O2  GA: alert, NAD, appears younger than stated age  Head: NC, AT, No LAD  Neck: supple, trachea appears midline, nO bruit, No LAD  PUL: breathing on NC @ 1 LPM, no use of accessory MM, speaking in full sentences, CTA anterolaterally, No W/R/R nor rubs  COR: RRR as per monitor, sounds RRR as well, No M/R/G  ABD: has moderate abdominal obesity, bowel sounds present, No G/R/T  MSK: no wasting of muscle stores, has increased fat stores, pressure stocking in place  Skin: warm, nondiaphoretic  PSY: appropriate affect, answers and asks questions appropriately    Labs:  Results for Nel Azul (MRN [id number]) as of 8/24/2018 20:44   Ref.  Range 8/24/2018 16:43 8/24/2018 16:45   Sodium Latest Ref Range: 136 - 145 mmol/L  137   Potassium Latest Ref Range: 3.5 - 5.0 mmol/L  4.7   Chloride Latest Ref Range: 98 - 111 mmol/L  99   CO2 Latest Ref Range: 22 - 29 mmol/L  24   BUN Latest Ref Range: 8 - 23 mg/dL  34 (H)   Creatinine Latest Ref Range: 0.5 - 1.2 mg/dL  1.7 (H)   Anion Gap Latest Ref Range: 7 - 19 mmol/L  14   GFR Non- Latest Ref Range: >60   38 (A)   Glucose Latest Ref Range: 74 - 109 mg/dL  244 (H)   Calcium Latest Ref Range: 8.2 - 9.6 mg/dL  9.2   Total Protein Latest Ref Range: 6.6 - 8.7 g/dL  7.9   Potassium, Whole Blood Unknown 4.2    Pro-BNP Latest Ref Range: 0 - 1,800 pg/mL  435   Troponin Latest Ref Range: 0.00 - 0.03 ng/mL  0.01   Albumin Latest Ref Range: 3.5 - 5.2 g/dL  4.2   Alk Phos Latest Ref Range: 40 - 130 U/L  76   ALT Latest Ref Range: 5 - 41 U/L  10   AST Latest Ref Range: 5 - 40 U/L  15   Bilirubin Latest Ref Range: 0.2 - 1.2 mg/dL  0.4   WBC Latest Ref Range: 4.8 - 10.8 K/uL  11.6 (H)   RBC Latest Ref Range: 4.70 - 6.10 M/uL  3.86 (L)   Hemoglobin Quant Latest Ref Range: 14.0 - 18.0 g/dL  12.3 (L)   Hematocrit Latest Ref Range: 42.0 - 52.0 %  37.7 (L)   MCV Latest Ref Range: 80.0 - 94.0 fL  97.7 (H)   MCH Latest Ref Range: 27.0 - 31.0 pg  31.9 (H)   MCHC Latest Ref Range: 33.0 - 37.0 g/dL  32.6 (L)   MPV Latest Ref Range: 9.4 - 12.4 fL  10.6   RDW Latest Ref Range: 11.5 - 14.5 %  13.0   Platelet Count Latest Ref Range: 130 - 400 K/uL  220   Neutrophils % Latest Ref Range: 50.0 - 65.0 %  60.5   Lymphocyte % Latest Ref Range: 20.0 - 40.0 %  25.3   Monocytes % Latest Ref Range: 0.0 - 10.0 %  7.8   Eosinophils % Latest Ref Range: 0.0 - 5.0 %  5.6 (H)   Basophils % Latest Ref Range: 0.0 - 1.0 %  0.5   Neutrophils # Latest Ref Range: 1.5 - 7.5 K/uL  7.0   Lymphocytes # Latest Ref Range: 1.1 - 4.5 K/uL  2.9   Monocytes # Latest Ref Range: 0.00 - 0.90 K/uL  0.90   Eosinophils # Latest Ref Range: 0.00 - 0.60 K/uL  0.70 (H)   Basophils # Latest Ref Range: 0.00 - 0.20 K/uL  0.10   Prothrombin Time Latest Ref Range: 12.0 - 14.6 sec  13.2   INR Latest Ref Range: 0.88 - 1.18   1.01   aPTT Latest Ref Range: 26.0 - 36.2 sec  31.2   O2 Therapy Unknown Unknown    Hemoglobin, Art, Extended Latest Ref Range: 14.0 - 18.0 g/dL 12.4 (L)    pH, Arterial Latest Ref Range: 7.350 - 7.450  7.420    pCO2, Arterial Latest Ref Range: 35.0 - 45.0 mmHg 33.0 (L)    pO2, Arterial Latest Ref Range: 80.0 - 100.0 mmHg 52.0 (L)    HCO3, Arterial Latest Ref Range: 22.0 - 26.0 mmol/L 21.4 (L)    Base Excess, Arterial Latest Ref Range: -2.0 - 2.0 mmol/L -2.4 (L)    O2 Sat, Arterial Latest Ref Range: >92 % 89.7 (L)    O2 Content, Arterial Latest Ref Range: Not Established mL/dL 15.6    Methemoglobin, Arterial Latest Ref Range: <1.5 % 1.2    Carboxyhgb, Arterial Latest Ref Range: 0.0 - 5.0 % 1.6      Diagnostics:  CXR Portable  Narrative   Exam:   XR CHEST PORTABLE     Date:  8/24/2018    History: Joseph Aburto, age  80 years; shortness of breath   COMPARISON:  Chest x-ray dated 5/11/1960   Findings :   Left-sided cardiac device in place. Median sternotomy wires appear   similar. Borderline size of cardiomediastinal silhouette, without convincing   evidence of fluid overload. Lungs are without focal infiltrate, mass   or effusions.  The bones show no acute pathology.         Impression   Impression:   No acute cardiopulmonary disease. Signed by Dr Conner Magdaleno on 8/24/2018 5:06 PM     CTA Chest  Narrative   CTA PULMONARY W CONTRAST    8/24/2018 6:40 PM   History: Chest pain and shortness of breath. In order to have a CT radiation dose as low as reasonably achievable   Automated Exposure Control was utilized for adjustment of the mA   and/or KV according to patient size. DLP in mGycm= 895. CABG changes. Cardiomegaly with extensive calcification of the native coronary   arteries. Aortic arch calcification with no evidence of aneurysm or dissection. Bilateral lower lobe and right upper lobe pulmonary emboli. Moderate   clot burden. No right heart enlargement. Chronic interstitial lung disease with no pneumonia or pneumothorax. Large left renal cysts noted.       Impression   1. Bilateral pulmonary emboli with moderate clot burden. 2. No right heart enlargement. 3. Chronic lung changes with no acute infiltrate.     Signed by Dr Carson Cooper on 8/24/2018 6:42 PM           ASESSMENTS & PLANS:    ADMIT: medical Tele Bed under hospitalist service  ADMITTING DIAGNOSIS: Symptomatic

## 2018-08-25 NOTE — PROGRESS NOTES
4 Eyes Skin Assessment    Herb Flynn is being assessed upon: Admission    I agree that Parish Alicea, along with Mirela Calle RN (either 2 RN's or 1 LPN and 1 RN) have performed a thorough Head to Toe Skin Assessment on the patient. ALL assessment sites listed below have been assessed. Areas assessed by both nurses:     [x]   Head, Face, and Ears   [x]   Shoulders, Back, and Chest  [x]   Arms, Elbows, and Hands   [x]   Coccyx, Sacrum, and Ischium  [x]   Legs, Feet, and Heels    Does the Patient have Skin Breakdown?  No    Arthur Prevention initiated: Yes  Wound Care Orders initiated: No    WOC nurse consulted for Pressure Injury (Stage 3,4, Unstageable, DTI, NWPT, and Complex wounds) and New or Established Ostomies: No        Primary Nurse eSignature: Charles Tijerina RN on 8/24/2018 at 10:40 PM      Co-Signer eSignature: Electronically signed by Ludy Alarcon RN on 8/24/18 at 10:42 PM

## 2018-08-26 ENCOUNTER — APPOINTMENT (OUTPATIENT)
Dept: ULTRASOUND IMAGING | Age: 83
DRG: 175 | End: 2018-08-26
Payer: MEDICARE

## 2018-08-26 VITALS
BODY MASS INDEX: 35.79 KG/M2 | TEMPERATURE: 97.2 F | HEART RATE: 61 BPM | RESPIRATION RATE: 18 BRPM | HEIGHT: 67 IN | OXYGEN SATURATION: 98 % | DIASTOLIC BLOOD PRESSURE: 69 MMHG | SYSTOLIC BLOOD PRESSURE: 141 MMHG | WEIGHT: 228 LBS

## 2018-08-26 LAB
ALBUMIN SERPL-MCNC: 3.7 G/DL (ref 3.5–5.2)
ALP BLD-CCNC: 69 U/L (ref 40–130)
ALT SERPL-CCNC: 9 U/L (ref 5–41)
ANION GAP SERPL CALCULATED.3IONS-SCNC: 12 MMOL/L (ref 7–19)
APTT: 37.3 SEC (ref 26–36.2)
APTT: 39.6 SEC (ref 26–36.2)
APTT: 41.1 SEC (ref 26–36.2)
AST SERPL-CCNC: 13 U/L (ref 5–40)
BILIRUB SERPL-MCNC: 0.3 MG/DL (ref 0.2–1.2)
BILIRUBIN URINE: NEGATIVE
BLOOD, URINE: NEGATIVE
BUN BLDV-MCNC: 31 MG/DL (ref 8–23)
CALCIUM SERPL-MCNC: 8.8 MG/DL (ref 8.2–9.6)
CHLORIDE BLD-SCNC: 105 MMOL/L (ref 98–111)
CLARITY: CLEAR
CO2: 24 MMOL/L (ref 22–29)
COLOR: YELLOW
CREAT SERPL-MCNC: 1.5 MG/DL (ref 0.5–1.2)
GFR NON-AFRICAN AMERICAN: 44
GLUCOSE BLD-MCNC: 193 MG/DL (ref 70–99)
GLUCOSE BLD-MCNC: 249 MG/DL (ref 70–99)
GLUCOSE BLD-MCNC: 299 MG/DL (ref 74–109)
GLUCOSE BLD-MCNC: 303 MG/DL (ref 70–99)
GLUCOSE URINE: >=1000 MG/DL
HCT VFR BLD CALC: 36.8 % (ref 42–52)
HEMOGLOBIN: 11.8 G/DL (ref 14–18)
KETONES, URINE: NEGATIVE MG/DL
LEUKOCYTE ESTERASE, URINE: NEGATIVE
MCH RBC QN AUTO: 31.9 PG (ref 27–31)
MCHC RBC AUTO-ENTMCNC: 32.1 G/DL (ref 33–37)
MCV RBC AUTO: 99.5 FL (ref 80–94)
NITRITE, URINE: NEGATIVE
PDW BLD-RTO: 13.2 % (ref 11.5–14.5)
PERFORMED ON: ABNORMAL
PH UA: 5.5
PLATELET # BLD: 220 K/UL (ref 130–400)
PMV BLD AUTO: 10.7 FL (ref 9.4–12.4)
POTASSIUM SERPL-SCNC: 4.5 MMOL/L (ref 3.5–5)
PROTEIN UA: NEGATIVE MG/DL
RBC # BLD: 3.7 M/UL (ref 4.7–6.1)
SODIUM BLD-SCNC: 141 MMOL/L (ref 136–145)
SODIUM URINE: 82 MMOL/L
SPECIFIC GRAVITY UA: 1.03
TOTAL PROTEIN: 7.2 G/DL (ref 6.6–8.7)
UROBILINOGEN, URINE: 0.2 E.U./DL
WBC # BLD: 9.1 K/UL (ref 4.8–10.8)

## 2018-08-26 PROCEDURE — 76770 US EXAM ABDO BACK WALL COMP: CPT

## 2018-08-26 PROCEDURE — 6370000000 HC RX 637 (ALT 250 FOR IP): Performed by: FAMILY MEDICINE

## 2018-08-26 PROCEDURE — 2580000003 HC RX 258: Performed by: INTERNAL MEDICINE

## 2018-08-26 PROCEDURE — 80053 COMPREHEN METABOLIC PANEL: CPT

## 2018-08-26 PROCEDURE — 6370000000 HC RX 637 (ALT 250 FOR IP): Performed by: HOSPITALIST

## 2018-08-26 PROCEDURE — 85730 THROMBOPLASTIN TIME PARTIAL: CPT

## 2018-08-26 PROCEDURE — 99238 HOSP IP/OBS DSCHRG MGMT 30/<: CPT | Performed by: FAMILY MEDICINE

## 2018-08-26 PROCEDURE — 82948 REAGENT STRIP/BLOOD GLUCOSE: CPT

## 2018-08-26 PROCEDURE — 81003 URINALYSIS AUTO W/O SCOPE: CPT

## 2018-08-26 PROCEDURE — 2580000003 HC RX 258: Performed by: HOSPITALIST

## 2018-08-26 PROCEDURE — 85027 COMPLETE CBC AUTOMATED: CPT

## 2018-08-26 PROCEDURE — 36415 COLL VENOUS BLD VENIPUNCTURE: CPT

## 2018-08-26 PROCEDURE — 2700000000 HC OXYGEN THERAPY PER DAY

## 2018-08-26 PROCEDURE — 84300 ASSAY OF URINE SODIUM: CPT

## 2018-08-26 RX ORDER — SPIRONOLACTONE 25 MG/1
12.5 TABLET ORAL DAILY
Qty: 90 TABLET | Refills: 0 | Status: SHIPPED | OUTPATIENT
Start: 2018-08-26 | End: 2018-09-06 | Stop reason: SDUPTHER

## 2018-08-26 RX ORDER — FUROSEMIDE 40 MG/1
20 TABLET ORAL DAILY
Qty: 90 TABLET | Refills: 0 | Status: SHIPPED | OUTPATIENT
Start: 2018-08-26 | End: 2018-09-20 | Stop reason: SDUPTHER

## 2018-08-26 RX ADMIN — CARVEDILOL 3.12 MG: 3.12 TABLET, FILM COATED ORAL at 09:13

## 2018-08-26 RX ADMIN — MULTIPLE VITAMINS W/ MINERALS TAB 1 TABLET: TAB at 09:12

## 2018-08-26 RX ADMIN — CLOPIDOGREL BISULFATE 75 MG: 75 TABLET ORAL at 09:13

## 2018-08-26 RX ADMIN — ISOSORBIDE DINITRATE 5 MG: 10 TABLET ORAL at 09:13

## 2018-08-26 RX ADMIN — SODIUM CHLORIDE: 9 INJECTION, SOLUTION INTRAVENOUS at 06:45

## 2018-08-26 RX ADMIN — TIMOLOL MALEATE 1 DROP: 5 SOLUTION OPHTHALMIC at 09:13

## 2018-08-26 RX ADMIN — Medication 10 ML: at 09:13

## 2018-08-26 RX ADMIN — CLOTRIMAZOLE: 10 CREAM TOPICAL at 09:13

## 2018-08-26 RX ADMIN — ISOSORBIDE DINITRATE 5 MG: 10 TABLET ORAL at 14:09

## 2018-08-26 RX ADMIN — APIXABAN 10 MG: 2.5 TABLET, FILM COATED ORAL at 09:12

## 2018-08-26 RX ADMIN — AMLODIPINE BESYLATE 5 MG: 5 TABLET ORAL at 09:12

## 2018-08-26 NOTE — PROGRESS NOTES
Nephrology ([facility]) Progress Note      Patient:  Nieves Prakash  YOB: 1925  Date of Service: 8/26/2018  MRN: [id number]   Acct: [de-identified]   Primary Care Physician: Jesusita Herndon MD  Advance Directive: Full Code  Admit Date: 8/24/2018       Hospital Day: 2  Referring Provider: David Shepherd DO    Patient independently seen and examined, Chart, Consults, Notes, Operative notes, Labs, Cardiology, and Radiology studies reviewed as able. Subjective:  Nieves Prakash is a 80 y.o. male  whom we were consulted for acute kidney injury/chronic kidney disease. He has stage III chronic kidney disease baseline secondary to diabetic nephropathy. Patient presented with severe shortness of breath, diagnosed with pulmonary embolism. He is currently on Eliquis 1 mg by mouth twice a day . His serum creatinine is 1.5 mg which is stable. He is currently on IV fluid    This morning he is sitting up in more alert and awake. Allergies:  Patient has no known allergies.     Medicines:  Current Facility-Administered Medications   Medication Dose Route Frequency Provider Last Rate Last Dose    apixaban (ELIQUIS) tablet 10 mg  10 mg Oral BID Gregorio Hammond DO   10 mg at 08/26/18 0912    0.9 % sodium chloride infusion   Intravenous Continuous Homero Torres MD 75 mL/hr at 08/26/18 0645      sodium chloride flush 0.9 % injection 10 mL  10 mL Intravenous 2 times per day Arin Velez MD   10 mL at 08/26/18 0913    sodium chloride flush 0.9 % injection 10 mL  10 mL Intravenous PRN Arin Velez MD        amLODIPine (NORVASC) tablet 5 mg  5 mg Oral Daily Arin Velez MD   5 mg at 08/26/18 0912    atorvastatin (LIPITOR) tablet 10 mg  10 mg Oral Nightly Arin Velez MD   10 mg at 08/25/18 2025    carvedilol (COREG) tablet 3.125 mg  3.125 mg Oral BID Arin Velez MD   3.125 mg at 08/26/18 0913    isosorbide dinitrate (ISORDIL) tablet 5 mg  5 mg Oral TID Arin Velez MD   5 mg at 08/26/18 5171    insulin glargine (LANTUS) injection vial 40 Units  40 Units Subcutaneous Nightly Charlene Lindsey MD   40 Units at 08/25/18 2030    meclizine (ANTIVERT) tablet 12.5 mg  12.5 mg Oral TID PRN Charlene Lindsey MD        therapeutic multivitamin-minerals 1 tablet  1 tablet Oral Daily Charlene Lindsey MD   1 tablet at 08/26/18 0912    naproxen (NAPROSYN) tablet 250 mg  250 mg Oral BID PRN Charlene Lindsey MD        nitroGLYCERIN (NITROSTAT) SL tablet 0.4 mg  0.4 mg Sublingual Q5 Min PRN Charlene Lindsey MD        polyethylene glycol [facility]) packet 17 g  17 g Oral Daily Charlene Lindsey MD   Stopped at 08/26/18 0916    timolol (TIMOPTIC) 0.5 % ophthalmic solution 1 drop  1 drop Both Eyes Q12H Charlene Lindsey MD   1 drop at 08/26/18 0913    clopidogrel (PLAVIX) tablet 75 mg  75 mg Oral Daily Charlene Lindsey MD   75 mg at 08/26/18 0913    clotrimazole (LOTRIMIN) 1 % cream   Topical BID Charlene Lindsey MD        glucose (GLUTOSE) 40 % oral gel 15 g  15 g Oral PRN Charlene Lindsey MD        dextrose 50 % solution 12.5 g  12.5 g Intravenous PRN Charlene Lindsey MD        glucagon (rDNA) injection 1 mg  1 mg Intramuscular PRN Charlene Lindsey MD        dextrose 5 % solution  100 mL/hr Intravenous PRN Charlene Lindsey MD           Past Medical History:  Past Medical History:   Diagnosis Date    Arthritis     CAD (coronary artery disease)     [facility]     CHF (congestive heart failure) (HCC)     CKD (chronic kidney disease) stage 3, GFR 30-59 ml/min 5/10/2016    [facility] cancer [facility]     Diabetes mellitus ([facility]     Diastolic heart failure ([facility]     H/O partial resection of colon     Hypertension     Pacemaker     Prostate CA ([facility]     Pure hypercholesterolemia 5/10/2016    Seizures ([facility]     Sick sinus syndrome ([facility]     Skin cancer        Past Surgical History:  Past Surgical History:   Procedure Laterality Date    CARDIAC SURGERY      CHOLECYSTECTOMY      COLECTOMY      COLON SURGERY         Family History  Family History   Problem Relation Age of Onset   Caro Lux Cancer Mother     Other Father        Social History  Social History     Social History    Marital status:      Spouse name: Marilynn santos    Number of children: N/A    Years of education: N/A     Occupational History    Not on file. Social History Main Topics    Smoking status: Former Smoker    Smokeless tobacco: Never Used    Alcohol use No    Drug use: No    Sexual activity: Not on file     Other Topics Concern    Not on file     Social History Narrative    No narrative on file         Review of Systems:  History obtained from chart review and the patient  General ROS: No fever or chills  Respiratory ROS: positive for - shortness of breath  Cardiovascular ROS: No chest pain or palpitations  Gastrointestinal ROS: No abdominal pain or melena  Genito-Urinary ROS: No dysuria or hematuria  Musculoskeletal ROS: No joint pain or swelling   14 point ROS reviewed with the patient and negative except as noted above and in the HPI unless unable to obtain. Objective:  Blood pressure 120/63, pulse 74, temperature 96.5 °F (35.8 °C), temperature source Oral, resp. rate 16, height 5' 7\" (1.702 m), weight 228 lb (103.4 kg), SpO2 94 %. Intake/Output Summary (Last 24 hours) at 08/26/18 1016  Last data filed at 08/26/18 0645   Gross per 24 hour   Intake          1131.81 ml   Output                0 ml   Net          1131.81 ml     General: awake/alert   HEENT: Normocephalic atraumatic head  Neck: Supple with no JVD or carotid bruits. Chest:  clear to auscultation bilaterally without respiratory distress  CVS: regular rate and rhythm  Abdominal: soft, nontender, normal bowel sounds/abdominal obesity.   Extremities: no cyanosis or edema  Skin: warm and dry without rash      Labs:  BMP: Recent Labs      08/24/18   1645  08/24/18   2247  08/26/18   0218   NA  137  133*  141   K  4.7  4.2  4.5   CL  99  98  105   CO2  24  21*  24   BUN  34*  30* 31*   CREATININE  1.7*  1.5*  1.5*   CALCIUM  9.2  8.7  8.8     CBC: Recent Labs      08/24/18   1645  08/24/18   2247  08/26/18 0218   WBC  11.6*  10.3  9.1   HGB  12.3*  11.8*  11.8*   HCT  37.7*  36.0*  36.8*   MCV  97.7*  96.5*  99.5*   PLT  220  211  220     LIVER PROFILE:   Recent Labs      08/24/18   1645  08/26/18 0218   AST  15  13   ALT  10  9   BILITOT  0.4  0.3   ALKPHOS  76  69     PT/INR:   Recent Labs      08/24/18 1645   PROTIME  13.2   INR  1.01     APTT:   Recent Labs      08/25/18   1508  08/26/18 0218  08/26/18 0830   APTT  71.2*  41.1*  39.6*     BNP:  No results for input(s): BNP in the last 72 hours. Ionized Calcium:No results for input(s): IONCA in the last 72 hours. Magnesium:No results for input(s): MG in the last 72 hours. Phosphorus:No results for input(s): PHOS in the last 72 hours. HgbA1C: No results for input(s): LABA1C in the last 72 hours. Hepatic: Recent Labs      08/24/18 1645  08/26/18 0218   ALKPHOS  76  69   ALT  10  9   AST  15  13   PROT  7.9  7.2   BILITOT  0.4  0.3   LABALBU  4.2  3.7     Lactic Acid: No results for input(s): LACTA in the last 72 hours. Troponin: No results for input(s): CKTOTAL, CKMB, TROPONINT in the last 72 hours. ABGs: No results for input(s): PH, PCO2, PO2, HCO3, O2SAT in the last 72 hours. CRP:  No results for input(s): CRP in the last 72 hours. Sed Rate:  No results for input(s): SEDRATE in the last 72 hours. Cultures:   No results for input(s): CULTURE in the last 72 hours. No results for input(s): BC, Cloria Regina in the last 72 hours. No results for input(s): CXSURG in the last 72 hours. Radiology reports as per the Radiologist  Radiology: Xr Chest Portable    Result Date: 8/24/2018  Exam:   XR CHEST PORTABLE  Date:  8/24/2018 History:  Male, age  80 years; shortness of breath COMPARISON:  Chest x-ray dated 5/11/1960 Findings : Left-sided cardiac device in place. Median sternotomy wires appear similar.  Borderline size

## 2018-08-26 NOTE — DISCHARGE SUMMARY
Davy Lucas  :  1925  MRN:  [id number]    Admit date:  2018  Discharge date:      Admitting Physician:  Sherlyn Sharp DO    Advance Directive: Full Code    Consults: nephrology    Primary Care Physician:  Wilder Lucas MD    Discharge Diagnoses:  Principal Problem:    Pulmonary embolism, bilateral [facility])  Active Problems:    Chronic diastolic congestive heart failure ([address]    Obesity due to excess calories    Complete heart block (HCC)    CKD (chronic kidney disease) stage 3, GFR 30-59 ml/min    Anemia in CKD (chronic kidney disease)    Diastolic heart failure (HCC)    Type 2 diabetes mellitus, with long-term current use of insulin ([facility]  Resolved Problems:    Acute respiratory failure with hypoxia ([address]      Significant Diagnostic Studies:   Xr Chest Portable    Result Date: 2018  Exam:   XR CHEST PORTABLE  Date:  2018 History:  Male, age  80 years; shortness of breath COMPARISON:  Chest x-ray dated 1960 Findings : Left-sided cardiac device in place. Median sternotomy wires appear similar. Borderline size of cardiomediastinal silhouette, without convincing evidence of fluid overload. Lungs are without focal infiltrate, mass or effusions. The bones show no acute pathology. Impression: No acute cardiopulmonary disease. Signed by Dr Sumi Mathews on 2018 5:06 PM    Cta Pulmonary W Contrast    Result Date: 2018  CTA PULMONARY W CONTRAST 2018 6:40 PM History: Chest pain and shortness of breath. In order to have a CT radiation dose as low as reasonably achievable Automated Exposure Control was utilized for adjustment of the mA and/or KV according to patient size. DLP in mGycm= 895. CABG changes. Cardiomegaly with extensive calcification of the native coronary arteries. Aortic arch calcification with no evidence of aneurysm or dissection. Bilateral lower lobe and right upper lobe pulmonary emboli. Moderate clot burden. No right heart enlargement.  Chronic interstitial lung auscultated. Abdomen:soft, non-tender; normal bowel sounds, no masses, no organomegaly. Extremities: No clubbing or cyanosis. No peripheral edema. Peripheral pulses palpable. Neurologic: Grossly intact. Discharge Medications:       Angelica Mccabe   Home Medication Instructions HOLLY:[id number]    Printed on:08/26/18 0509   Medication Information                      amLODIPine (NORVASC) 5 MG tablet  TAKE 1 TABLET DAILY (MUST KEEP FOLLOW UP FOR ADDITIONAL REFILLS)             apixaban (ELIQUIS STARTER PACK) 5 MG TABS tablet  Take 10 mg (2 tablets) orally twice daily for 7 days, then take 5 mg (1 tablet) orally twice daily thereafter. atorvastatin (LIPITOR) 10 MG tablet  TAKE 1 TABLET DAILY AT BEDTIME             B-D INS SYR ULTRAFINE 1CC/31G 31G X 5/16\" 1 ML MISC               carvedilol (COREG) 3.125 MG tablet  TAKE 1 TABLET TWICE A DAY             clopidogrel (PLAVIX) 75 MG tablet  TAKE 1 TABLET DAILY             Coenzyme Q10 (COQ10 PO)  Take 10 mg by mouth nightly             ezetimibe (ZETIA) 10 MG tablet  TAKE 1 TABLET DAILY             furosemide (LASIX) 40 MG tablet  Take 0.5 tablets by mouth daily             Garlic 10 MG CAPS  Take 1 capsule by mouth daily Pt unsure of dose. States it changes because he gets whatever is on sale.              insulin lispro (HUMALOG) 100 UNIT/ML injection vial  As directed per sliding scale             isosorbide dinitrate (ISORDIL) 5 MG tablet  TAKE 3 TABLETS TWICE A DAY             LANTUS 100 UNIT/ML injection vial  INJECT 40 UNITS UNDER THE SKIN AT BEDTIME             meclizine (ANTIVERT) 12.5 MG tablet  Take 12.5 mg by mouth 3 times daily as needed             Multiple Vitamins-Minerals (THERAPEUTIC MULTIVITAMIN-MINERALS) tablet  Take 1 tablet by mouth daily             nitroGLYCERIN (NITROSTAT) 0.4 MG SL tablet  Place 0.4 mg under the tongue every 5 minutes as needed for Chest pain             polyethylene glycol (GLYCOLAX) powder  Take 17 g by mouth

## 2018-08-26 NOTE — PLAN OF CARE
Problem: Falls - Risk of: Intervention: Assess potential safety hazards  Ongoing. Intervention: Assess risk factors for falls  Ongoing. Intervention: Assess medication that may increase risk of fall  Ongoing. Intervention: Gait assessment  Ongoing. Intervention: Postfall assessment  None. Intervention: Assess fall prevention measures  Ongoing. Intervention: Assist ambulation  Ongoing. Intervention: Manage a safe environment  Ongoing. Intervention: Assist regular exercise  Ongoing. Intervention: Implement fall precaution identifier  Ongoing. Intervention: Flexibility training  Ongoing. Intervention: Instruct strength and mobility  Ongoing. Intervention: Toileting assistance  Ongoing. Intervention: Appropriate assistance to ensure safe transfer  Ongoing. Goal: Will remain free from falls  Will remain free from falls   Outcome: Ongoing    Goal: Absence of physical injury  Absence of physical injury   Outcome: Ongoing      Problem: Risk for Impaired Skin Integrity  Goal: Tissue integrity - skin and mucous membranes  Structural intactness and normal physiological function of skin and  mucous membranes. Outcome: Ongoing    Intervention: SKIN ASSESSMENT  See assessment. Intervention: PRESSURE ULCER PREVENTION  Pt is able to turn self. Intervention: TURN PATIENT  Pt is able to turn self.

## 2018-08-27 ENCOUNTER — TELEPHONE (OUTPATIENT)
Dept: INTERNAL MEDICINE | Age: 83
End: 2018-08-27

## 2018-08-27 RX ORDER — EZETIMIBE 10 MG/1
TABLET ORAL
Qty: 90 TABLET | Refills: 1 | Status: SHIPPED | OUTPATIENT
Start: 2018-08-27 | End: 2019-02-22 | Stop reason: SDUPTHER

## 2018-08-27 RX ORDER — CARVEDILOL 3.12 MG/1
TABLET ORAL
Qty: 180 TABLET | Refills: 1 | Status: SHIPPED | OUTPATIENT
Start: 2018-08-27 | End: 2019-02-22 | Stop reason: SDUPTHER

## 2018-08-27 NOTE — TELEPHONE ENCOUNTER
[facility] Transitions Initial Follow Up Call    Outreach made within 2 business days of discharge: Yes    Patient: Nieves Prakash Patient : 1925   MRN: [id number]  Reason for Admission: Admitted on 18 for Shortness of Breath Discharge Date: 18       Spoke with: Deanna Talbot, patient's son     Discharge department/facility: [place] [address] Interactive Patient Contact:  Was patient able to fill all prescriptions: Yes, patients son is at the pharmacy to  medications now. Was patient instructed to bring all medications to the follow-up visit: Yes  Is patient taking all medications as directed in the discharge summary? Yes  Does patient understand their discharge instructions: Yes  Does patient have questions or concerns that need addressed prior to 7-14 day follow up office visit: no    I spoke with Deanna Talbot, Mr. Marcos Apgar son. He states Mr. Pinzon is doing well. He was started on Eloquis and he is at the pharmacy now picking up that medication. He states he is eating well and feeling much better. Shortness of breath is improved. Patient has a follow up with Dr. Mk Higuera scheduled for tomorrow. Pt's son made aware to bring all medications to that visit.      Scheduled appointment with PCP within 7-14 days    Follow Up  Future Appointments  Date Time Provider Janes Littlejohn   2018 10:30 AM Jesusita Herndon MD [facility]   10/18/2018 9:15 AM Jesusita Herndon MD [facility]   2019 1:15 PM SHRUTI Campa [facility] [address] [place] [address]

## 2018-08-28 ENCOUNTER — OFFICE VISIT (OUTPATIENT)
Dept: INTERNAL MEDICINE | Age: 83
End: 2018-08-28
Payer: MEDICARE

## 2018-08-28 VITALS
OXYGEN SATURATION: 99 % | SYSTOLIC BLOOD PRESSURE: 104 MMHG | HEART RATE: 84 BPM | DIASTOLIC BLOOD PRESSURE: 56 MMHG | WEIGHT: 227 LBS | HEIGHT: 68 IN | BODY MASS INDEX: 34.4 KG/M2

## 2018-08-28 DIAGNOSIS — E11.8 TYPE 2 DIABETES MELLITUS WITH COMPLICATION, WITH LONG-TERM CURRENT USE OF INSULIN (HCC): Chronic | ICD-10-CM

## 2018-08-28 DIAGNOSIS — N18.30 CKD (CHRONIC KIDNEY DISEASE) STAGE 3, GFR 30-59 ML/MIN (HCC): Chronic | ICD-10-CM

## 2018-08-28 DIAGNOSIS — Z79.4 TYPE 2 DIABETES MELLITUS WITH COMPLICATION, WITH LONG-TERM CURRENT USE OF INSULIN (HCC): Chronic | ICD-10-CM

## 2018-08-28 DIAGNOSIS — I26.99 PULMONARY EMBOLISM, BILATERAL (HCC): Primary | ICD-10-CM

## 2018-08-28 PROCEDURE — 99496 TRANSJ CARE MGMT HIGH F2F 7D: CPT | Performed by: INTERNAL MEDICINE

## 2018-08-28 PROCEDURE — 1111F DSCHRG MED/CURRENT MED MERGE: CPT | Performed by: INTERNAL MEDICINE

## 2018-08-28 ASSESSMENT — ENCOUNTER SYMPTOMS
BLOOD IN STOOL: 0
NAUSEA: 0
SINUS PRESSURE: 0
EYE DISCHARGE: 0
VOMITING: 0
ABDOMINAL PAIN: 0
SHORTNESS OF BREATH: 1
ABDOMINAL DISTENTION: 0
TROUBLE SWALLOWING: 0
BACK PAIN: 0
SORE THROAT: 0
WHEEZING: 0
EYE ITCHING: 0

## 2018-08-28 NOTE — PROGRESS NOTES
Post-Discharge Transitional Care Management Services      Britany Pinzon   YOB: 1925    Date of Visit:  8/28/2018  30 Day Post-Discharge Date: September 26, 2018    No Known Allergies  No outpatient prescriptions have been marked as taking for the 8/28/18 encounter (Office Visit) with Taz Murdock MD.         Vitals:    08/28/18 1049   BP: (!) 104/56   Pulse: 84   SpO2: 99%   Weight: 227 lb (103 kg)   Height: 5' 8\" (1.727 m)     Body mass index is 34.52 kg/m². Wt Readings from Last 3 Encounters:   08/28/18 227 lb (103 kg)   08/25/18 228 lb (103.4 kg)   08/15/18 226 lb (102.5 kg)     BP Readings from Last 3 Encounters:   08/28/18 (!) 104/56   08/26/18 (!) 141/69   08/15/18 137/69        Patient was admitted to [facility] from August 24, 2018 to August 26, 2018 for acute pulmonary emboli and acute renal failure. HPI:  Raudel was admitted to the hospital on August 24 with increased shortness of breath. He presented to [facility] ER and was admitted with bilateral pulmonary emboli. He also had acute kidney failure and was seen in consultation by nephrology. He's now on eliquis is a blood thinner. Inpatient course: Discharge summary reviewed- see chart. Current status: Stable    Review of Systems:  Review of Systems   Constitutional: Positive for fatigue. Negative for activity change, appetite change and fever. HENT: Negative for congestion, hearing loss, sinus pressure, sore throat and trouble swallowing. Eyes: Negative for discharge and itching. Respiratory: Positive for shortness of breath. Negative for wheezing. Cardiovascular: Positive for leg swelling. Negative for chest pain and palpitations. Gastrointestinal: Negative for abdominal distention, abdominal pain, blood in stool, nausea and vomiting. Endocrine: Negative for cold intolerance, heat intolerance and polydipsia. Genitourinary: Negative for flank pain, frequency, hematuria and urgency. Musculoskeletal: Negative for arthralgias, back pain and joint swelling. Skin: Negative for rash and wound. Allergic/Immunologic: Negative for environmental allergies and food allergies. Neurological: Positive for weakness. Negative for dizziness, tremors, syncope, numbness and headaches. Hematological: Negative for adenopathy. Psychiatric/Behavioral: Negative for agitation and hallucinations. The patient is not nervous/anxious. Physical Exam:  Physical Exam   Constitutional: He is oriented to person, place, and time. He appears well-developed and well-nourished. No distress. HENT:   Head: Normocephalic and atraumatic. Right Ear: External ear normal.   Left Ear: External ear normal.   Nose: Nose normal.   Mouth/Throat: Oropharynx is clear and moist. No oropharyngeal exudate. Eyes: Pupils are equal, round, and reactive to light. Conjunctivae and EOM are normal. Right eye exhibits no discharge. Left eye exhibits no discharge. No scleral icterus. Neck: Normal range of motion. Neck supple. No JVD present. No tracheal deviation present. No thyromegaly present. Cardiovascular: Normal rate, regular rhythm, normal heart sounds and intact distal pulses. Exam reveals no gallop and no friction rub. No murmur heard. Pulmonary/Chest: Effort normal and breath sounds normal. No respiratory distress. He has no wheezes. He has no rales. Abdominal: Soft. Bowel sounds are normal. He exhibits no distension and no mass. There is no tenderness. There is no rebound and no guarding. Musculoskeletal: Normal range of motion. He exhibits no edema, tenderness or deformity. Lymphadenopathy:     He has no cervical adenopathy. Neurological: He is alert and oriented to person, place, and time. He has normal reflexes. No cranial nerve deficit. He exhibits normal muscle tone. Coordination normal.   Skin: Skin is warm and dry. No rash noted. He is not diaphoretic. No erythema. No pallor.    Psychiatric: He has a normal mood and affect. His behavior is normal. Judgment and thought content normal.       Initial post-discharge communication occurred between nurse care coordinator and patient on August 27, 2018- see documentation in chart: telephone encounter. Assessment/Plan:  Shyanne Herndon was seen today for follow-up from hospital.    Diagnoses and all orders for this visit:    Pulmonary embolism, bilateral ([address]  -     [place] DISCHARGE MEDS RECONCILED W/ CURRENT OUTPATIENT MED LIST    Type 2 diabetes mellitus with complication, with long-term current use of insulin (HCC)  -     [place] DISCHARGE MEDS RECONCILED W/ CURRENT OUTPATIENT MED LIST    CKD (chronic kidney disease) stage 3, GFR 30-59 ml/min  -     [place] DISCHARGE MEDS RECONCILED W/ CURRENT OUTPATIENT MED LIST      He's doing better at the present time. No distress the etiology for his blood clots was ever discovered. He is going to be on eliquis from now on. He seems to be tolerating it well. He had acute kidney injury as well and he was seen in consultation by the nephrologist who recommended that he be taken off his naproxen. In the fact that he is on blood thinner now he doesn't need to be on NSAIDs at all.     Diagnostic test results reviewed: inpatient labs    Patient risk of morbidity and mortality: high    Medical Decision Making: high complexity

## 2018-08-29 LAB
EKG P AXIS: NORMAL DEGREES
EKG P-R INTERVAL: NORMAL MS
EKG Q-T INTERVAL: 452 MS
EKG QRS DURATION: 170 MS
EKG QTC CALCULATION (BAZETT): 455 MS
EKG T AXIS: 105 DEGREES

## 2018-09-06 RX ORDER — SPIRONOLACTONE 25 MG/1
TABLET ORAL
Qty: 90 TABLET | Refills: 3 | Status: SHIPPED | OUTPATIENT
Start: 2018-09-06 | End: 2019-08-14 | Stop reason: SDUPTHER

## 2018-09-14 DIAGNOSIS — I26.99 BILATERAL PULMONARY EMBOLISM (HCC): ICD-10-CM

## 2018-09-14 NOTE — TELEPHONE ENCOUNTER
Jackson Horan called requesting a refill of the below medication which has been pended for you:     Requested Prescriptions     Pending Prescriptions Disp Refills    apixaban (ELIQUIS) 5 MG TABS tablet 180 tablet 1     Sig: Take 1 tablet by mouth 2 times daily       Last Appointment Date: 8/28/2018  Next Appointment Date: 10/18/2018    No Known Allergies

## 2018-09-20 RX ORDER — ATORVASTATIN CALCIUM 10 MG/1
TABLET, FILM COATED ORAL
Qty: 90 TABLET | Refills: 1 | Status: SHIPPED | OUTPATIENT
Start: 2018-09-20 | End: 2019-03-19 | Stop reason: SDUPTHER

## 2018-09-20 RX ORDER — FUROSEMIDE 40 MG/1
TABLET ORAL
Qty: 90 TABLET | Refills: 1 | Status: SHIPPED | OUTPATIENT
Start: 2018-09-20 | End: 2019-03-19 | Stop reason: SDUPTHER

## 2018-10-04 RX ORDER — AMLODIPINE BESYLATE 5 MG/1
TABLET ORAL
Qty: 90 TABLET | Refills: 1 | Status: SHIPPED | OUTPATIENT
Start: 2018-10-04 | End: 2019-04-02 | Stop reason: SDUPTHER

## 2018-10-16 DIAGNOSIS — E11.8 TYPE 2 DIABETES MELLITUS WITH COMPLICATION, WITH LONG-TERM CURRENT USE OF INSULIN (HCC): Chronic | ICD-10-CM

## 2018-10-16 DIAGNOSIS — Z79.4 TYPE 2 DIABETES MELLITUS WITH COMPLICATION, WITH LONG-TERM CURRENT USE OF INSULIN (HCC): Chronic | ICD-10-CM

## 2018-10-16 LAB — HBA1C MFR BLD: 11.1 % (ref 4–6)

## 2018-10-18 ENCOUNTER — OFFICE VISIT (OUTPATIENT)
Dept: INTERNAL MEDICINE | Age: 83
End: 2018-10-18
Payer: MEDICARE

## 2018-10-18 VITALS
HEART RATE: 79 BPM | WEIGHT: 230 LBS | OXYGEN SATURATION: 100 % | RESPIRATION RATE: 20 BRPM | SYSTOLIC BLOOD PRESSURE: 126 MMHG | DIASTOLIC BLOOD PRESSURE: 66 MMHG | BODY MASS INDEX: 34.86 KG/M2 | HEIGHT: 68 IN

## 2018-10-18 DIAGNOSIS — Z79.4 TYPE 2 DIABETES MELLITUS WITH COMPLICATION, WITH LONG-TERM CURRENT USE OF INSULIN (HCC): Primary | Chronic | ICD-10-CM

## 2018-10-18 DIAGNOSIS — E78.00 HYPERCHOLESTEREMIA: ICD-10-CM

## 2018-10-18 DIAGNOSIS — E11.8 TYPE 2 DIABETES MELLITUS WITH COMPLICATION, WITH LONG-TERM CURRENT USE OF INSULIN (HCC): Primary | Chronic | ICD-10-CM

## 2018-10-18 DIAGNOSIS — I50.32 CHRONIC DIASTOLIC CONGESTIVE HEART FAILURE (HCC): ICD-10-CM

## 2018-10-18 DIAGNOSIS — Z23 NEED FOR PNEUMOCOCCAL VACCINE: ICD-10-CM

## 2018-10-18 DIAGNOSIS — R53.82 CHRONIC FATIGUE: ICD-10-CM

## 2018-10-18 PROCEDURE — 1036F TOBACCO NON-USER: CPT | Performed by: INTERNAL MEDICINE

## 2018-10-18 PROCEDURE — G8484 FLU IMMUNIZE NO ADMIN: HCPCS | Performed by: INTERNAL MEDICINE

## 2018-10-18 PROCEDURE — 1123F ACP DISCUSS/DSCN MKR DOCD: CPT | Performed by: INTERNAL MEDICINE

## 2018-10-18 PROCEDURE — 90670 PCV13 VACCINE IM: CPT | Performed by: INTERNAL MEDICINE

## 2018-10-18 PROCEDURE — G8427 DOCREV CUR MEDS BY ELIG CLIN: HCPCS | Performed by: INTERNAL MEDICINE

## 2018-10-18 PROCEDURE — 4040F PNEUMOC VAC/ADMIN/RCVD: CPT | Performed by: INTERNAL MEDICINE

## 2018-10-18 PROCEDURE — 99214 OFFICE O/P EST MOD 30 MIN: CPT | Performed by: INTERNAL MEDICINE

## 2018-10-18 PROCEDURE — 1101F PT FALLS ASSESS-DOCD LE1/YR: CPT | Performed by: INTERNAL MEDICINE

## 2018-10-18 PROCEDURE — G0009 ADMIN PNEUMOCOCCAL VACCINE: HCPCS | Performed by: INTERNAL MEDICINE

## 2018-10-18 PROCEDURE — G8417 CALC BMI ABV UP PARAM F/U: HCPCS | Performed by: INTERNAL MEDICINE

## 2018-10-18 PROCEDURE — G8598 ASA/ANTIPLAT THER USED: HCPCS | Performed by: INTERNAL MEDICINE

## 2018-10-18 ASSESSMENT — ENCOUNTER SYMPTOMS
SHORTNESS OF BREATH: 1
ABDOMINAL PAIN: 0
WHEEZING: 0
EYE DISCHARGE: 0
BACK PAIN: 0
BLOOD IN STOOL: 0
TROUBLE SWALLOWING: 0
ABDOMINAL DISTENTION: 0
EYE ITCHING: 0
NAUSEA: 0
SINUS PRESSURE: 0
SORE THROAT: 0
VOMITING: 0

## 2018-10-18 NOTE — PROGRESS NOTES
[facility]  [address] [facility]  [address] [place] [address]  Dept: [phone]  Dept Fax: [phone]: [phone]      Visit Date: 10/18/2018    Screven Class a 80 y.o. male who presents today for:  Chief Complaint   Patient presents with    Diabetes    Shortness of Breath     Complains he is very easily winded. HPI:     Ayesha Castelan is 80years old in for follow-up visit. He's a type II diabetic this poorly controlled due to noncompliance with diet. He also has a history of congestive heart failure and we chronic fatigue and weakness. He's doing about the same as normal. The issue we have with him as being in the assisted living facility and he has very limited food options and he's not very good at making choices as far as diet goes. His balance is not real good either and we tend air on keeping his blood sugar a little higher than too low because I'm afraid he's got pass out break a bone. Social History   Substance Use Topics    Smoking status: Former Smoker    Smokeless tobacco: Never Used    Alcohol use No      Current Outpatient Prescriptions   Medication Sig Dispense Refill    atorvastatin (LIPITOR) 10 MG tablet TAKE 1 TABLET DAILY AT BEDTIME 90 tablet 1    furosemide (LASIX) 40 MG tablet TAKE 1 TABLET DAILY 90 tablet 1    apixaban (ELIQUIS) 5 MG TABS tablet Take 1 tablet by mouth 2 times daily 180 tablet 1    spironolactone (ALDACTONE) 25 MG tablet TAKE 1 TABLET DAILY 90 tablet 3    ezetimibe (ZETIA) 10 MG tablet TAKE 1 TABLET DAILY 90 tablet 1    carvedilol (COREG) 3.125 MG tablet TAKE 1 TABLET TWICE A  tablet 1    apixaban (ELIQUIS STARTER PACK) 5 MG TABS tablet Take 10 mg (2 tablets) orally twice daily for 7 days, then take 5 mg (1 tablet) orally twice daily thereafter.  74 tablet 0    polyethylene glycol (GLYCOLAX) powder Take 17 g by mouth daily 1 Bottle 5    isosorbide dinitrate (ISORDIL) 5 MG tablet TAKE 3

## 2018-10-25 ENCOUNTER — APPOINTMENT (OUTPATIENT)
Dept: GENERAL RADIOLOGY | Age: 83
End: 2018-10-25
Payer: MEDICARE

## 2018-10-25 ENCOUNTER — APPOINTMENT (OUTPATIENT)
Dept: CT IMAGING | Age: 83
End: 2018-10-25
Payer: MEDICARE

## 2018-10-25 ENCOUNTER — HOSPITAL ENCOUNTER (EMERGENCY)
Age: 83
Discharge: ANOTHER HOSPITAL | End: 2018-10-25
Attending: EMERGENCY MEDICINE
Payer: MEDICARE

## 2018-10-25 ENCOUNTER — OFFICE VISIT (OUTPATIENT)
Dept: URGENT CARE | Age: 83
End: 2018-10-25

## 2018-10-25 ENCOUNTER — TELEPHONE (OUTPATIENT)
Dept: INTERNAL MEDICINE | Age: 83
End: 2018-10-25

## 2018-10-25 VITALS
HEART RATE: 73 BPM | HEIGHT: 68 IN | DIASTOLIC BLOOD PRESSURE: 62 MMHG | TEMPERATURE: 98 F | RESPIRATION RATE: 16 BRPM | WEIGHT: 227 LBS | BODY MASS INDEX: 34.4 KG/M2 | OXYGEN SATURATION: 95 % | SYSTOLIC BLOOD PRESSURE: 138 MMHG

## 2018-10-25 VITALS
HEIGHT: 68 IN | WEIGHT: 227 LBS | DIASTOLIC BLOOD PRESSURE: 68 MMHG | RESPIRATION RATE: 16 BRPM | HEART RATE: 87 BPM | OXYGEN SATURATION: 98 % | TEMPERATURE: 97.4 F | BODY MASS INDEX: 34.4 KG/M2 | SYSTOLIC BLOOD PRESSURE: 148 MMHG

## 2018-10-25 DIAGNOSIS — L03.116 CELLULITIS OF FOOT, LEFT: Primary | ICD-10-CM

## 2018-10-25 DIAGNOSIS — M79.89 LEG SWELLING: Primary | ICD-10-CM

## 2018-10-25 DIAGNOSIS — S90.122A HEMATOMA OF TOE OF LEFT FOOT, INITIAL ENCOUNTER: ICD-10-CM

## 2018-10-25 LAB
ALBUMIN SERPL-MCNC: 3.9 G/DL (ref 3.5–5.2)
ALP BLD-CCNC: 93 U/L (ref 40–130)
ALT SERPL-CCNC: 11 U/L (ref 5–41)
ANION GAP SERPL CALCULATED.3IONS-SCNC: 14 MMOL/L (ref 7–19)
AST SERPL-CCNC: 12 U/L (ref 5–40)
BASOPHILS ABSOLUTE: 0.1 K/UL (ref 0–0.2)
BASOPHILS RELATIVE PERCENT: 0.4 % (ref 0–1)
BILIRUB SERPL-MCNC: 0.4 MG/DL (ref 0.2–1.2)
BUN BLDV-MCNC: 32 MG/DL (ref 8–23)
C-REACTIVE PROTEIN: 8.89 MG/DL (ref 0–0.5)
CALCIUM SERPL-MCNC: 9.2 MG/DL (ref 8.2–9.6)
CHLORIDE BLD-SCNC: 99 MMOL/L (ref 98–111)
CO2: 22 MMOL/L (ref 22–29)
CREAT SERPL-MCNC: 1.5 MG/DL (ref 0.5–1.2)
EOSINOPHILS ABSOLUTE: 0.3 K/UL (ref 0–0.6)
EOSINOPHILS RELATIVE PERCENT: 2.3 % (ref 0–5)
GFR NON-AFRICAN AMERICAN: 44
GLUCOSE BLD-MCNC: 368 MG/DL (ref 74–109)
HCT VFR BLD CALC: 37.9 % (ref 42–52)
HEMOGLOBIN: 12 G/DL (ref 14–18)
LYMPHOCYTES ABSOLUTE: 2.7 K/UL (ref 1.1–4.5)
LYMPHOCYTES RELATIVE PERCENT: 18.1 % (ref 20–40)
MCH RBC QN AUTO: 31 PG (ref 27–31)
MCHC RBC AUTO-ENTMCNC: 31.7 G/DL (ref 33–37)
MCV RBC AUTO: 97.9 FL (ref 80–94)
MONOCYTES ABSOLUTE: 1.1 K/UL (ref 0–0.9)
MONOCYTES RELATIVE PERCENT: 7.2 % (ref 0–10)
NEUTROPHILS ABSOLUTE: 10.6 K/UL (ref 1.5–7.5)
NEUTROPHILS RELATIVE PERCENT: 71.7 % (ref 50–65)
PDW BLD-RTO: 12.9 % (ref 11.5–14.5)
PLATELET # BLD: 238 K/UL (ref 130–400)
PMV BLD AUTO: 10.9 FL (ref 9.4–12.4)
POTASSIUM SERPL-SCNC: 4.5 MMOL/L (ref 3.5–5)
RBC # BLD: 3.87 M/UL (ref 4.7–6.1)
SEDIMENTATION RATE, ERYTHROCYTE: 107 MM/HR (ref 0–15)
SODIUM BLD-SCNC: 135 MMOL/L (ref 136–145)
TOTAL PROTEIN: 8.3 G/DL (ref 6.6–8.7)
WBC # BLD: 14.8 K/UL (ref 4.8–10.8)

## 2018-10-25 PROCEDURE — 73700 CT LOWER EXTREMITY W/O DYE: CPT

## 2018-10-25 PROCEDURE — 85652 RBC SED RATE AUTOMATED: CPT

## 2018-10-25 PROCEDURE — 99284 EMERGENCY DEPT VISIT MOD MDM: CPT | Performed by: EMERGENCY MEDICINE

## 2018-10-25 PROCEDURE — 99999 PR OFFICE/OUTPT VISIT,PROCEDURE ONLY: CPT | Performed by: SPECIALIST

## 2018-10-25 PROCEDURE — 99284 EMERGENCY DEPT VISIT MOD MDM: CPT

## 2018-10-25 PROCEDURE — 73630 X-RAY EXAM OF FOOT: CPT

## 2018-10-25 PROCEDURE — 85025 COMPLETE CBC W/AUTO DIFF WBC: CPT

## 2018-10-25 PROCEDURE — 36415 COLL VENOUS BLD VENIPUNCTURE: CPT

## 2018-10-25 PROCEDURE — 86140 C-REACTIVE PROTEIN: CPT

## 2018-10-25 PROCEDURE — 80053 COMPREHEN METABOLIC PANEL: CPT

## 2018-10-25 RX ORDER — CLINDAMYCIN HYDROCHLORIDE 150 MG/1
300 CAPSULE ORAL 3 TIMES DAILY
Qty: 60 CAPSULE | Refills: 0 | Status: ON HOLD | OUTPATIENT
Start: 2018-10-25 | End: 2018-10-31

## 2018-10-25 ASSESSMENT — ENCOUNTER SYMPTOMS
PHOTOPHOBIA: 0
VOMITING: 0
DIARRHEA: 0
WHEEZING: 0
COLOR CHANGE: 1
NAUSEA: 0
ABDOMINAL PAIN: 0
CONSTIPATION: 0
TROUBLE SWALLOWING: 0
BACK PAIN: 0
EYE PAIN: 0
SINUS PRESSURE: 0
SHORTNESS OF BREATH: 0
CHEST TIGHTNESS: 0

## 2018-10-26 ENCOUNTER — TELEPHONE (OUTPATIENT)
Dept: INTERNAL MEDICINE | Age: 83
End: 2018-10-26

## 2018-10-26 DIAGNOSIS — Z95.0 PACEMAKER: Primary | ICD-10-CM

## 2018-10-26 DIAGNOSIS — I44.2 COMPLETE HEART BLOCK (HCC): ICD-10-CM

## 2018-10-26 PROCEDURE — 93294 REM INTERROG EVL PM/LDLS PM: CPT | Performed by: CLINICAL NURSE SPECIALIST

## 2018-10-26 PROCEDURE — 93296 REM INTERROG EVL PM/IDS: CPT | Performed by: CLINICAL NURSE SPECIALIST

## 2018-10-26 NOTE — ED PROVIDER NOTES
Negative for agitation, confusion, hallucinations and suicidal ideas. The patient is not nervous/anxious. PAST MEDICALHISTORY     Past Medical History:   Diagnosis Date    Arthritis     CAD (coronary artery disease)     [facility]     [facility]congestive [facility] failure) [facility]     CKD (chronic kidney disease) stage 3, GFR 30-59 ml/min ([facility]) 5/10/2016    [facility] ([facility]     Diabetes mellitus (HCC)     Diastolic heart failure ([facility])     H/O partial resection of colon     Hypertension     Pacemaker     Prostate CA ([facility]     Pure hypercholesterolemia 5/10/2016    Seizures ([facility])     Sick sinus syndrome ([facility]     Skin cancer          SURGICAL HISTORY       Past Surgical History:   Procedure Laterality Date    CARDIAC SURGERY      CHOLECYSTECTOMY      COLECTOMY      COLON SURGERY           CURRENT MEDICATIONS     Previous Medications    AMLODIPINE (NORVASC) 5 MG TABLET    TAKE 1 TABLET DAILY (MUST KEEP FOLLOW UP FOR ADDITIONAL REFILLS)    APIXABAN (ELIQUIS STARTER PACK) 5 MG TABS TABLET    Take 10 mg (2 tablets) orally twice daily for 7 days, then take 5 mg (1 tablet) orally twice daily thereafter. APIXABAN (ELIQUIS) 5 MG TABS TABLET    Take 1 tablet by mouth 2 times daily    ATORVASTATIN (LIPITOR) 10 MG TABLET    TAKE 1 TABLET DAILY AT BEDTIME    B-D INS SYR ULTRAFINE 1CC/31G 31G X 5/16\" 1 ML MISC        CARVEDILOL (COREG) 3.125 MG TABLET    TAKE 1 TABLET TWICE A DAY    CLOPIDOGREL (PLAVIX) 75 MG TABLET    TAKE 1 TABLET DAILY    COENZYME Q10 (COQ10 PO)    Take 10 mg by mouth nightly    EZETIMIBE (ZETIA) 10 MG TABLET    TAKE 1 TABLET DAILY    FUROSEMIDE (LASIX) 40 MG TABLET    TAKE 1 TABLET DAILY    GARLIC 10 MG CAPS    Take 1 capsule by mouth daily Pt unsure of dose. States it changes because he gets whatever is on sale.     INSULIN LISPRO (HUMALOG) 100 UNIT/ML INJECTION VIAL    As directed per sliding scale    ISOSORBIDE DINITRATE (ISORDIL) 5 MG TABLET    TAKE 3 TABLETS TWICE A DAY Jami Curiel MD (electronically signed)  Attending Emergency Physician          Lorraine Bhagat MD  10/25/18 [id number]

## 2018-10-29 ENCOUNTER — TELEPHONE (OUTPATIENT)
Dept: INTERNAL MEDICINE | Age: 83
End: 2018-10-29

## 2018-10-31 ENCOUNTER — APPOINTMENT (OUTPATIENT)
Dept: GENERAL RADIOLOGY | Age: 83
DRG: 623 | End: 2018-10-31
Attending: INTERNAL MEDICINE
Payer: MEDICARE

## 2018-10-31 ENCOUNTER — HOSPITAL ENCOUNTER (INPATIENT)
Age: 83
LOS: 4 days | Discharge: HOME HEALTH | DRG: 623 | End: 2018-11-04
Attending: INTERNAL MEDICINE | Admitting: INTERNAL MEDICINE
Payer: MEDICARE

## 2018-10-31 ENCOUNTER — OFFICE VISIT (OUTPATIENT)
Dept: INTERNAL MEDICINE | Age: 83
End: 2018-10-31
Payer: MEDICARE

## 2018-10-31 VITALS
SYSTOLIC BLOOD PRESSURE: 124 MMHG | DIASTOLIC BLOOD PRESSURE: 60 MMHG | TEMPERATURE: 97.7 F | OXYGEN SATURATION: 98 % | HEART RATE: 63 BPM

## 2018-10-31 DIAGNOSIS — L03.116 CELLULITIS OF LEFT FOOT: Primary | ICD-10-CM

## 2018-10-31 DIAGNOSIS — E11.621 DIABETIC ULCER OF LEFT MIDFOOT ASSOCIATED WITH TYPE 2 DIABETES MELLITUS, UNSPECIFIED ULCER STAGE (HCC): ICD-10-CM

## 2018-10-31 DIAGNOSIS — L97.429 DIABETIC ULCER OF LEFT MIDFOOT ASSOCIATED WITH TYPE 2 DIABETES MELLITUS, UNSPECIFIED ULCER STAGE (HCC): ICD-10-CM

## 2018-10-31 PROBLEM — E11.628 DIABETIC FOOT INFECTION (HCC): Status: ACTIVE | Noted: 2018-10-31

## 2018-10-31 PROBLEM — L08.9 DIABETIC FOOT INFECTION (HCC): Status: ACTIVE | Noted: 2018-10-31

## 2018-10-31 LAB
ALBUMIN SERPL-MCNC: 3.1 G/DL (ref 3.5–5.2)
ALBUMIN SERPL-MCNC: 3.3 G/DL (ref 3.5–5.2)
ALP BLD-CCNC: 107 U/L (ref 40–130)
ALP BLD-CCNC: 97 U/L (ref 40–130)
ALT SERPL-CCNC: 12 U/L (ref 5–41)
ALT SERPL-CCNC: 13 U/L (ref 5–41)
ANION GAP SERPL CALCULATED.3IONS-SCNC: 12 MMOL/L (ref 7–19)
ANION GAP SERPL CALCULATED.3IONS-SCNC: 15 MMOL/L (ref 7–19)
AST SERPL-CCNC: 13 U/L (ref 5–40)
AST SERPL-CCNC: 17 U/L (ref 5–40)
BACTERIA: NEGATIVE /HPF
BASOPHILS ABSOLUTE: 0 K/UL (ref 0–0.2)
BASOPHILS RELATIVE PERCENT: 0.3 % (ref 0–1)
BILIRUB SERPL-MCNC: 0.3 MG/DL (ref 0.2–1.2)
BILIRUB SERPL-MCNC: 0.4 MG/DL (ref 0.2–1.2)
BILIRUBIN URINE: NEGATIVE
BLOOD, URINE: ABNORMAL
BUN BLDV-MCNC: 37 MG/DL (ref 8–23)
BUN BLDV-MCNC: 38 MG/DL (ref 8–23)
C-REACTIVE PROTEIN: 10.91 MG/DL (ref 0–0.5)
CALCIUM SERPL-MCNC: 9.1 MG/DL (ref 8.2–9.6)
CALCIUM SERPL-MCNC: 9.4 MG/DL (ref 8.2–9.6)
CHLORIDE BLD-SCNC: 97 MMOL/L (ref 98–111)
CHLORIDE BLD-SCNC: 98 MMOL/L (ref 98–111)
CLARITY: CLEAR
CO2: 23 MMOL/L (ref 22–29)
CO2: 24 MMOL/L (ref 22–29)
COLOR: YELLOW
CREAT SERPL-MCNC: 1.6 MG/DL (ref 0.5–1.2)
CREAT SERPL-MCNC: 1.7 MG/DL (ref 0.5–1.2)
EOSINOPHILS ABSOLUTE: 0.4 K/UL (ref 0–0.6)
EOSINOPHILS RELATIVE PERCENT: 2.8 % (ref 0–5)
EPITHELIAL CELLS, UA: 0 /HPF (ref 0–5)
GFR NON-AFRICAN AMERICAN: 38
GFR NON-AFRICAN AMERICAN: 40
GLUCOSE BLD-MCNC: 342 MG/DL (ref 70–99)
GLUCOSE BLD-MCNC: 411 MG/DL (ref 70–99)
GLUCOSE BLD-MCNC: 437 MG/DL (ref 70–99)
GLUCOSE BLD-MCNC: 442 MG/DL (ref 74–109)
GLUCOSE BLD-MCNC: 469 MG/DL (ref 70–99)
GLUCOSE BLD-MCNC: 480 MG/DL (ref 74–109)
GLUCOSE URINE: >=1000 MG/DL
HBA1C MFR BLD: 11.5 % (ref 4–6)
HCT VFR BLD CALC: 33.6 % (ref 42–52)
HEMOGLOBIN: 10.6 G/DL (ref 14–18)
HYALINE CASTS: 2 /HPF (ref 0–8)
KETONES, URINE: NEGATIVE MG/DL
LACTIC ACID: 1.6 MMOL/L (ref 0.5–1.9)
LEUKOCYTE ESTERASE, URINE: NEGATIVE
LYMPHOCYTES ABSOLUTE: 2.5 K/UL (ref 1.1–4.5)
LYMPHOCYTES RELATIVE PERCENT: 17.3 % (ref 20–40)
MAGNESIUM: 2.3 MG/DL (ref 1.7–2.3)
MCH RBC QN AUTO: 30.9 PG (ref 27–31)
MCHC RBC AUTO-ENTMCNC: 31.5 G/DL (ref 33–37)
MCV RBC AUTO: 98 FL (ref 80–94)
MONOCYTES ABSOLUTE: 0.9 K/UL (ref 0–0.9)
MONOCYTES RELATIVE PERCENT: 6.4 % (ref 0–10)
NEUTROPHILS ABSOLUTE: 10.4 K/UL (ref 1.5–7.5)
NEUTROPHILS RELATIVE PERCENT: 72.7 % (ref 50–65)
NITRITE, URINE: NEGATIVE
PDW BLD-RTO: 12.9 % (ref 11.5–14.5)
PERFORMED ON: ABNORMAL
PH UA: 5.5
PLATELET # BLD: 231 K/UL (ref 130–400)
PMV BLD AUTO: 11 FL (ref 9.4–12.4)
POTASSIUM SERPL-SCNC: 5 MMOL/L (ref 3.5–5)
POTASSIUM SERPL-SCNC: 5.5 MMOL/L (ref 3.5–5)
PRO-BNP: 1307 PG/ML (ref 0–1800)
PROTEIN UA: NEGATIVE MG/DL
RBC # BLD: 3.43 M/UL (ref 4.7–6.1)
RBC UA: 2 /HPF (ref 0–4)
SEDIMENTATION RATE, ERYTHROCYTE: 116 MM/HR (ref 0–15)
SODIUM BLD-SCNC: 134 MMOL/L (ref 136–145)
SODIUM BLD-SCNC: 135 MMOL/L (ref 136–145)
SPECIFIC GRAVITY UA: 1.02
TOTAL PROTEIN: 7.4 G/DL (ref 6.6–8.7)
TOTAL PROTEIN: 8.2 G/DL (ref 6.6–8.7)
TROPONIN: <0.01 NG/ML (ref 0–0.03)
UROBILINOGEN, URINE: 0.2 E.U./DL
WBC # BLD: 14.3 K/UL (ref 4.8–10.8)
WBC UA: 0 /HPF (ref 0–5)

## 2018-10-31 PROCEDURE — 6360000002 HC RX W HCPCS: Performed by: PHYSICIAN ASSISTANT

## 2018-10-31 PROCEDURE — 99222 1ST HOSP IP/OBS MODERATE 55: CPT | Performed by: INTERNAL MEDICINE

## 2018-10-31 PROCEDURE — G8427 DOCREV CUR MEDS BY ELIG CLIN: HCPCS | Performed by: NURSE PRACTITIONER

## 2018-10-31 PROCEDURE — 73630 X-RAY EXAM OF FOOT: CPT

## 2018-10-31 PROCEDURE — 81001 URINALYSIS AUTO W/SCOPE: CPT

## 2018-10-31 PROCEDURE — 80053 COMPREHEN METABOLIC PANEL: CPT

## 2018-10-31 PROCEDURE — 1210000000 HC MED SURG R&B

## 2018-10-31 PROCEDURE — 83036 HEMOGLOBIN GLYCOSYLATED A1C: CPT

## 2018-10-31 PROCEDURE — 83880 ASSAY OF NATRIURETIC PEPTIDE: CPT

## 2018-10-31 PROCEDURE — 86140 C-REACTIVE PROTEIN: CPT

## 2018-10-31 PROCEDURE — 2580000003 HC RX 258: Performed by: PHYSICIAN ASSISTANT

## 2018-10-31 PROCEDURE — 85652 RBC SED RATE AUTOMATED: CPT

## 2018-10-31 PROCEDURE — 71045 X-RAY EXAM CHEST 1 VIEW: CPT

## 2018-10-31 PROCEDURE — 83605 ASSAY OF LACTIC ACID: CPT

## 2018-10-31 PROCEDURE — 93005 ELECTROCARDIOGRAM TRACING: CPT

## 2018-10-31 PROCEDURE — 99215 OFFICE O/P EST HI 40 MIN: CPT | Performed by: NURSE PRACTITIONER

## 2018-10-31 PROCEDURE — 94640 AIRWAY INHALATION TREATMENT: CPT

## 2018-10-31 PROCEDURE — 85025 COMPLETE CBC W/AUTO DIFF WBC: CPT

## 2018-10-31 PROCEDURE — 1101F PT FALLS ASSESS-DOCD LE1/YR: CPT | Performed by: NURSE PRACTITIONER

## 2018-10-31 PROCEDURE — 94664 DEMO&/EVAL PT USE INHALER: CPT

## 2018-10-31 PROCEDURE — 6370000000 HC RX 637 (ALT 250 FOR IP): Performed by: INTERNAL MEDICINE

## 2018-10-31 PROCEDURE — 87040 BLOOD CULTURE FOR BACTERIA: CPT

## 2018-10-31 PROCEDURE — 84484 ASSAY OF TROPONIN QUANT: CPT

## 2018-10-31 PROCEDURE — 87086 URINE CULTURE/COLONY COUNT: CPT

## 2018-10-31 PROCEDURE — 4040F PNEUMOC VAC/ADMIN/RCVD: CPT | Performed by: NURSE PRACTITIONER

## 2018-10-31 PROCEDURE — G8598 ASA/ANTIPLAT THER USED: HCPCS | Performed by: NURSE PRACTITIONER

## 2018-10-31 PROCEDURE — 2580000003 HC RX 258: Performed by: HOSPITALIST

## 2018-10-31 PROCEDURE — 2580000003 HC RX 258: Performed by: INTERNAL MEDICINE

## 2018-10-31 PROCEDURE — G8417 CALC BMI ABV UP PARAM F/U: HCPCS | Performed by: NURSE PRACTITIONER

## 2018-10-31 PROCEDURE — 1036F TOBACCO NON-USER: CPT | Performed by: NURSE PRACTITIONER

## 2018-10-31 PROCEDURE — G8484 FLU IMMUNIZE NO ADMIN: HCPCS | Performed by: NURSE PRACTITIONER

## 2018-10-31 PROCEDURE — 6370000000 HC RX 637 (ALT 250 FOR IP): Performed by: SURGERY

## 2018-10-31 PROCEDURE — 6360000002 HC RX W HCPCS: Performed by: HOSPITALIST

## 2018-10-31 PROCEDURE — 83735 ASSAY OF MAGNESIUM: CPT

## 2018-10-31 PROCEDURE — 36415 COLL VENOUS BLD VENIPUNCTURE: CPT

## 2018-10-31 PROCEDURE — 1123F ACP DISCUSS/DSCN MKR DOCD: CPT | Performed by: NURSE PRACTITIONER

## 2018-10-31 PROCEDURE — 82948 REAGENT STRIP/BLOOD GLUCOSE: CPT

## 2018-10-31 RX ORDER — M-VIT,TX,IRON,MINS/CALC/FOLIC 27MG-0.4MG
1 TABLET ORAL DAILY
Status: DISCONTINUED | OUTPATIENT
Start: 2018-11-01 | End: 2018-11-04 | Stop reason: HOSPADM

## 2018-10-31 RX ORDER — CHLORHEXIDINE GLUCONATE 4 G/100ML
SOLUTION TOPICAL ONCE
Status: COMPLETED | OUTPATIENT
Start: 2018-10-31 | End: 2018-10-31

## 2018-10-31 RX ORDER — AMLODIPINE BESYLATE 5 MG/1
10 TABLET ORAL DAILY
Status: DISCONTINUED | OUTPATIENT
Start: 2018-10-31 | End: 2018-11-04 | Stop reason: HOSPADM

## 2018-10-31 RX ORDER — NICOTINE POLACRILEX 4 MG
15 LOZENGE BUCCAL PRN
Status: DISCONTINUED | OUTPATIENT
Start: 2018-10-31 | End: 2018-11-04 | Stop reason: HOSPADM

## 2018-10-31 RX ORDER — INSULIN GLARGINE 100 [IU]/ML
10 INJECTION, SOLUTION SUBCUTANEOUS NIGHTLY
Status: DISCONTINUED | OUTPATIENT
Start: 2018-10-31 | End: 2018-10-31

## 2018-10-31 RX ORDER — 0.9 % SODIUM CHLORIDE 0.9 %
250 INTRAVENOUS SOLUTION INTRAVENOUS ONCE
Status: COMPLETED | OUTPATIENT
Start: 2018-10-31 | End: 2018-10-31

## 2018-10-31 RX ORDER — INSULIN GLARGINE 100 [IU]/ML
8 INJECTION, SOLUTION SUBCUTANEOUS ONCE
Status: COMPLETED | OUTPATIENT
Start: 2018-10-31 | End: 2018-11-01

## 2018-10-31 RX ORDER — IPRATROPIUM BROMIDE AND ALBUTEROL SULFATE 2.5; .5 MG/3ML; MG/3ML
1 SOLUTION RESPIRATORY (INHALATION)
Status: DISCONTINUED | OUTPATIENT
Start: 2018-10-31 | End: 2018-11-04 | Stop reason: HOSPADM

## 2018-10-31 RX ORDER — 0.9 % SODIUM CHLORIDE 0.9 %
500 INTRAVENOUS SOLUTION INTRAVENOUS ONCE
Status: COMPLETED | OUTPATIENT
Start: 2018-10-31 | End: 2018-10-31

## 2018-10-31 RX ORDER — INSULIN GLARGINE 100 [IU]/ML
15 INJECTION, SOLUTION SUBCUTANEOUS NIGHTLY
Status: DISCONTINUED | OUTPATIENT
Start: 2018-11-01 | End: 2018-11-01

## 2018-10-31 RX ORDER — SODIUM CHLORIDE 9 MG/ML
INJECTION, SOLUTION INTRAVENOUS CONTINUOUS
Status: DISCONTINUED | OUTPATIENT
Start: 2018-10-31 | End: 2018-11-01

## 2018-10-31 RX ORDER — DEXTROSE MONOHYDRATE 50 MG/ML
100 INJECTION, SOLUTION INTRAVENOUS PRN
Status: DISCONTINUED | OUTPATIENT
Start: 2018-10-31 | End: 2018-11-04 | Stop reason: HOSPADM

## 2018-10-31 RX ORDER — DEXTROSE MONOHYDRATE 25 G/50ML
12.5 INJECTION, SOLUTION INTRAVENOUS PRN
Status: DISCONTINUED | OUTPATIENT
Start: 2018-10-31 | End: 2018-11-04 | Stop reason: HOSPADM

## 2018-10-31 RX ORDER — SODIUM POLYSTYRENE SULFONATE 15 G/60ML
15 SUSPENSION ORAL; RECTAL ONCE
Status: COMPLETED | OUTPATIENT
Start: 2018-10-31 | End: 2018-10-31

## 2018-10-31 RX ORDER — VANCOMYCIN HYDROCHLORIDE 1 G/200ML
1000 INJECTION, SOLUTION INTRAVENOUS EVERY 12 HOURS
Status: DISCONTINUED | OUTPATIENT
Start: 2018-10-31 | End: 2018-10-31 | Stop reason: DRUGHIGH

## 2018-10-31 RX ORDER — CLOPIDOGREL BISULFATE 75 MG/1
75 TABLET ORAL DAILY
Status: DISCONTINUED | OUTPATIENT
Start: 2018-10-31 | End: 2018-11-04 | Stop reason: HOSPADM

## 2018-10-31 RX ADMIN — IPRATROPIUM BROMIDE AND ALBUTEROL SULFATE 1 AMPULE: .5; 3 SOLUTION RESPIRATORY (INHALATION) at 19:17

## 2018-10-31 RX ADMIN — INSULIN GLARGINE 10 UNITS: 100 INJECTION, SOLUTION SUBCUTANEOUS at 20:45

## 2018-10-31 RX ADMIN — CLOPIDOGREL BISULFATE 75 MG: 75 TABLET ORAL at 18:23

## 2018-10-31 RX ADMIN — MEROPENEM 1 G: 1 INJECTION, POWDER, FOR SOLUTION INTRAVENOUS at 16:23

## 2018-10-31 RX ADMIN — APIXABAN 5 MG: 5 TABLET, FILM COATED ORAL at 20:46

## 2018-10-31 RX ADMIN — SODIUM CHLORIDE 500 ML: 9 INJECTION, SOLUTION INTRAVENOUS at 16:13

## 2018-10-31 RX ADMIN — IPRATROPIUM BROMIDE AND ALBUTEROL SULFATE 1 AMPULE: .5; 3 SOLUTION RESPIRATORY (INHALATION) at 16:17

## 2018-10-31 RX ADMIN — VANCOMYCIN HYDROCHLORIDE 1250 MG: 10 INJECTION, POWDER, LYOPHILIZED, FOR SOLUTION INTRAVENOUS at 16:13

## 2018-10-31 RX ADMIN — SODIUM CHLORIDE 250 ML: 9 INJECTION, SOLUTION INTRAVENOUS at 20:14

## 2018-10-31 RX ADMIN — CHLORHEXIDINE GLUCONATE: 213 SOLUTION TOPICAL at 23:02

## 2018-10-31 RX ADMIN — AMLODIPINE BESYLATE 10 MG: 5 TABLET ORAL at 18:23

## 2018-10-31 RX ADMIN — SODIUM CHLORIDE: 9 INJECTION, SOLUTION INTRAVENOUS at 21:26

## 2018-10-31 RX ADMIN — SODIUM POLYSTYRENE SULFONATE 15 G: 15 SUSPENSION ORAL; RECTAL at 16:13

## 2018-10-31 RX ADMIN — INSULIN LISPRO 3 UNITS: 100 INJECTION, SOLUTION INTRAVENOUS; SUBCUTANEOUS at 20:46

## 2018-10-31 RX ADMIN — INSULIN LISPRO 6 UNITS: 100 INJECTION, SOLUTION INTRAVENOUS; SUBCUTANEOUS at 17:18

## 2018-10-31 ASSESSMENT — ENCOUNTER SYMPTOMS
VOMITING: 0
PHOTOPHOBIA: 0
RHINORRHEA: 0
COLOR CHANGE: 0
BACK PAIN: 0
SHORTNESS OF BREATH: 0
VOICE CHANGE: 0
COUGH: 0
NAUSEA: 0

## 2018-10-31 NOTE — H&P
H&P  Chief Complaint:  L foot wound    History Of Present Illness:   80 y.o. male PMH DM2, CHF, HTN presents with L foot wound. Seen at PCP's office this am with meaningful L foot erythema and obvious abscess. PCP spoke with vascular and agreed to admit pt to hospital for possible I&D. Has noticed L foot wound for at least a few days. Pt is poor historian majority of history taken from family and chart review. Review of Systems:   14 ROS completed, all are negative except below:  Denies fevers    Past Medical History:        Diagnosis Date    Arthritis     CAD (coronary artery disease)     [facility]     [facility]     CKD (chronic kidney disease) stage 3, GFR 30-59 ml/min ([facility]) 5/10/2016    Colon cancer [facility]     Diabetes mellitus ([facility]     Diastolic heart failure ([facility]     H/O partial resection of colon     Hypertension     Pacemaker     Prostate CA ([facility]     Pure hypercholesterolemia 5/10/2016    Seizures ([facility]     Sick sinus syndrome ([facility]     Skin cancer          Procedure Laterality Date    CARDIAC SURGERY      CHOLECYSTECTOMY      COLECTOMY      COLON SURGERY           Home Medications:  Prior to Admission medications    Medication Sig Start Date End Date Taking?  Authorizing Provider   amLODIPine (NORVASC) 5 MG tablet TAKE 1 TABLET DAILY (MUST KEEP FOLLOW UP FOR ADDITIONAL REFILLS) 10/4/18  Yes Tran Rucker MD   atorvastatin (LIPITOR) 10 MG tablet TAKE 1 TABLET DAILY AT BEDTIME 9/20/18  Yes Tran Rucker MD   furosemide (LASIX) 40 MG tablet TAKE 1 TABLET DAILY  Patient taking differently: 1/2 tablet 9/20/18  Yes Tran Rucker MD   apixaban Charm Huguenin) 5 MG TABS tablet Take 1 tablet by mouth 2 times daily 9/14/18  Yes Tran Rucker MD   spironolactone (ALDACTONE) 25 MG tablet TAKE 1 TABLET DAILY  Patient taking differently: take 1/2 tablet 9/6/18  Yes Tran Rucker MD   ezetimibe (ZETIA) 10 MG tablet TAKE 1 TABLET DAILY 8/27/18  Yes

## 2018-10-31 NOTE — PROGRESS NOTES
Musculoskeletal: Normal range of motion. Feet:   Left Foot:   Skin Integrity: Positive for ulcer (see photo below), skin breakdown, erythema, warmth and dry skin. Neurological: He is alert and oriented to person, place, and time. Skin: Skin is warm and dry. Psychiatric: He has a normal mood and affect. His behavior is normal.   Nursing note and vitals reviewed. /60 (Site: Left Upper Arm, Position: Sitting)   Pulse 63   Temp 97.7 °F (36.5 °C)   SpO2 98%           Assessment:       Diagnosis Orders   1. Cellulitis of left foot  [facility], ANJU Chaudhary MD   2. Diabetic ulcer of left midfoot associated with type 2 diabetes mellitus, unspecified ulcer stage [facility])  [id number] [facility], ANJU Chaudhary MD     Lab Results   Component Value Date    SEDRATE 107 (H) 10/25/2018     Lab Results   Component Value Date    CRP 8.89 (H) 10/25/2018     Lab Results   Component Value Date    WBC 14.8 (H) 10/25/2018    HGB 12.0 (L) 10/25/2018    HCT 37.9 (L) 10/25/2018    MCV 97.9 (H) 10/25/2018     10/25/2018     Lab Results   Component Value Date     (L) 10/25/2018    K 4.5 10/25/2018    CL 99 10/25/2018    CO2 22 10/25/2018    BUN 32 (H) 10/25/2018    CREATININE 1.5 (H) 10/25/2018    GLUCOSE 368 (H) 10/25/2018    CALCIUM 9.2 10/25/2018    PROT 8.3 10/25/2018    LABALBU 3.9 10/25/2018    BILITOT 0.4 10/25/2018    ALKPHOS 93 10/25/2018    AST 12 10/25/2018    ALT 11 10/25/2018    LABGLOM 44 (A) 10/25/2018    GLOB 3.9 05/10/2016         Plan:     Patient has extremely elevated glucose that needs to be addressed and is not aiding in wound healing. He states blood sugar was >500 this morning; he is evidently tolerates this level without symptoms. He is currently taking 40 units Lantus nightly and on sliding scale humalog with meals. We will need to increase Lantus- (hospital to manage due to admission). ..   Patient has fairly severe foot infection; he will likely need debridement and IV

## 2018-10-31 NOTE — CONSULTS
Topics    Smoking status: Former Smoker    Smokeless tobacco: Never Used    Alcohol use No       Review of Systems    Constitutional - no significant activity change, appetite change, or unexpected weight change. No fever or chills. No diaphoresis or significant fatigue. HENT - no significant rhinorrhea or epistaxis. No tinnitus or significant hearing loss. Eyes - no sudden vision change or amaurosis. Respiratory - no significant shortness of breath, wheezing, or stridor. No apnea, cough, or chest tightness associated with shortness of breath. Cardiovascular - no chest pain, syncope, or significant dizziness. No palpitations. Reports leg swelling. Gastrointestinal - no abdominal swelling or pain. No blood in stool. No severe constipation, diarrhea, nausea, or vomiting. Genitourinary - No difficulty urinating, dysuria, frequency, or urgency. No flank pain or hematuria. Musculoskeletal - no back pain, gait disturbance, or myalgia. Skin - no color change, rash, pallor. Left foot wound present x I week. Neurologic - no dizziness, facial asymmetry, or light headedness. No seizures. No speech difficulty or lateralizing weakness. Hematologic - no easy bruising or excessive bleeding. Psychiatric - no severe anxiety or nervousness. No confusion. All other review of systems are negative. Physical Exam    BP (!) 174/68   Pulse 60   Temp 97.3 °F (36.3 °C)   Resp 18   SpO2 96%     Constitutional - well developed, well nourished. No diaphoresis or acute distress. HENT - head normocephalic. Right external ear canal appears normal.  Left external ear canal appears normal.  Septum appears midline. Eyes - conjunctiva normal.  EOMS normal.  No exudate. No icterus. Neck- ROM appears normal, no tracheal deviation. Cardiovascular - Regular rate and rhythm. Heart sounds are normal.  No murmur, rub, or gallop. Carotid pulses are 2+ to palpation bilaterally without bruit.     Extremities -

## 2018-11-01 ENCOUNTER — ANESTHESIA EVENT (OUTPATIENT)
Dept: OPERATING ROOM | Age: 83
DRG: 623 | End: 2018-11-01
Payer: MEDICARE

## 2018-11-01 ENCOUNTER — ANESTHESIA (OUTPATIENT)
Dept: OPERATING ROOM | Age: 83
DRG: 623 | End: 2018-11-01
Payer: MEDICARE

## 2018-11-01 VITALS
SYSTOLIC BLOOD PRESSURE: 130 MMHG | OXYGEN SATURATION: 97 % | DIASTOLIC BLOOD PRESSURE: 56 MMHG | RESPIRATION RATE: 1 BRPM | TEMPERATURE: 97.2 F

## 2018-11-01 PROBLEM — E87.5 HYPERKALEMIA: Status: ACTIVE | Noted: 2018-11-01

## 2018-11-01 LAB
ALBUMIN SERPL-MCNC: 3 G/DL (ref 3.5–5.2)
ALBUMIN SERPL-MCNC: 3.2 G/DL (ref 3.5–5.2)
ALP BLD-CCNC: 100 U/L (ref 40–130)
ALP BLD-CCNC: 92 U/L (ref 40–130)
ALT SERPL-CCNC: 12 U/L (ref 5–41)
ALT SERPL-CCNC: 12 U/L (ref 5–41)
ANION GAP SERPL CALCULATED.3IONS-SCNC: 14 MMOL/L (ref 7–19)
ANION GAP SERPL CALCULATED.3IONS-SCNC: 14 MMOL/L (ref 7–19)
AST SERPL-CCNC: 15 U/L (ref 5–40)
AST SERPL-CCNC: 16 U/L (ref 5–40)
BASOPHILS ABSOLUTE: 0 K/UL (ref 0–0.2)
BASOPHILS RELATIVE PERCENT: 0.4 % (ref 0–1)
BILIRUB SERPL-MCNC: 0.4 MG/DL (ref 0.2–1.2)
BILIRUB SERPL-MCNC: 0.4 MG/DL (ref 0.2–1.2)
BUN BLDV-MCNC: 28 MG/DL (ref 8–23)
BUN BLDV-MCNC: 29 MG/DL (ref 8–23)
CALCIUM SERPL-MCNC: 8.3 MG/DL (ref 8.2–9.6)
CALCIUM SERPL-MCNC: 8.7 MG/DL (ref 8.2–9.6)
CHLORIDE BLD-SCNC: 100 MMOL/L (ref 98–111)
CHLORIDE BLD-SCNC: 103 MMOL/L (ref 98–111)
CO2: 21 MMOL/L (ref 22–29)
CO2: 23 MMOL/L (ref 22–29)
CREAT SERPL-MCNC: 1.1 MG/DL (ref 0.5–1.2)
CREAT SERPL-MCNC: 1.2 MG/DL (ref 0.5–1.2)
EKG P AXIS: NORMAL DEGREES
EKG P-R INTERVAL: NORMAL MS
EKG Q-T INTERVAL: 486 MS
EKG QRS DURATION: 170 MS
EKG QTC CALCULATION (BAZETT): 487 MS
EKG T AXIS: 97 DEGREES
EOSINOPHILS ABSOLUTE: 0.4 K/UL (ref 0–0.6)
EOSINOPHILS RELATIVE PERCENT: 3.9 % (ref 0–5)
GFR NON-AFRICAN AMERICAN: 56
GFR NON-AFRICAN AMERICAN: >60
GLUCOSE BLD-MCNC: 267 MG/DL (ref 70–99)
GLUCOSE BLD-MCNC: 278 MG/DL (ref 70–99)
GLUCOSE BLD-MCNC: 286 MG/DL (ref 70–99)
GLUCOSE BLD-MCNC: 304 MG/DL (ref 74–109)
GLUCOSE BLD-MCNC: 432 MG/DL (ref 70–99)
GLUCOSE BLD-MCNC: 439 MG/DL (ref 70–99)
GLUCOSE BLD-MCNC: 439 MG/DL (ref 74–109)
GLUCOSE BLD-MCNC: 491 MG/DL (ref 70–99)
HCT VFR BLD CALC: 31.6 % (ref 42–52)
HEMOGLOBIN: 10.8 G/DL (ref 14–18)
LYMPHOCYTES ABSOLUTE: 2.9 K/UL (ref 1.1–4.5)
LYMPHOCYTES RELATIVE PERCENT: 26.4 % (ref 20–40)
MCH RBC QN AUTO: 31.5 PG (ref 27–31)
MCHC RBC AUTO-ENTMCNC: 34.2 G/DL (ref 33–37)
MCV RBC AUTO: 92.1 FL (ref 80–94)
MONOCYTES ABSOLUTE: 0.8 K/UL (ref 0–0.9)
MONOCYTES RELATIVE PERCENT: 7 % (ref 0–10)
NEUTROPHILS ABSOLUTE: 6.7 K/UL (ref 1.5–7.5)
NEUTROPHILS RELATIVE PERCENT: 61.6 % (ref 50–65)
PDW BLD-RTO: 12.9 % (ref 11.5–14.5)
PERFORMED ON: ABNORMAL
PLATELET # BLD: 269 K/UL (ref 130–400)
PMV BLD AUTO: 10.7 FL (ref 9.4–12.4)
POTASSIUM SERPL-SCNC: 4.8 MMOL/L (ref 3.5–5)
POTASSIUM SERPL-SCNC: 5.5 MMOL/L (ref 3.5–4.9)
RBC # BLD: 3.43 M/UL (ref 4.7–6.1)
SODIUM BLD-SCNC: 137 MMOL/L (ref 136–145)
SODIUM BLD-SCNC: 138 MMOL/L (ref 136–145)
TOTAL PROTEIN: 6.5 G/DL (ref 6.6–8.7)
TOTAL PROTEIN: 7.8 G/DL (ref 6.6–8.7)
TROPONIN: <0.01 NG/ML (ref 0–0.03)
TROPONIN: <0.01 NG/ML (ref 0–0.03)
WBC # BLD: 10.8 K/UL (ref 4.8–10.8)

## 2018-11-01 PROCEDURE — 99232 SBSQ HOSP IP/OBS MODERATE 35: CPT | Performed by: PHYSICIAN ASSISTANT

## 2018-11-01 PROCEDURE — 3600000003 HC SURGERY LEVEL 3 BASE: Performed by: SURGERY

## 2018-11-01 PROCEDURE — 10060 I&D ABSCESS SIMPLE/SINGLE: CPT | Performed by: SURGERY

## 2018-11-01 PROCEDURE — 6360000002 HC RX W HCPCS: Performed by: NURSE ANESTHETIST, CERTIFIED REGISTERED

## 2018-11-01 PROCEDURE — 85025 COMPLETE CBC W/AUTO DIFF WBC: CPT

## 2018-11-01 PROCEDURE — 99999 PR OFFICE/OUTPT VISIT,PROCEDURE ONLY: CPT | Performed by: PHYSICIAN ASSISTANT

## 2018-11-01 PROCEDURE — 87070 CULTURE OTHR SPECIMN AEROBIC: CPT

## 2018-11-01 PROCEDURE — 6370000000 HC RX 637 (ALT 250 FOR IP): Performed by: SURGERY

## 2018-11-01 PROCEDURE — 94664 DEMO&/EVAL PT USE INHALER: CPT

## 2018-11-01 PROCEDURE — 76942 ECHO GUIDE FOR BIOPSY: CPT | Performed by: NURSE ANESTHETIST, CERTIFIED REGISTERED

## 2018-11-01 PROCEDURE — 2700000000 HC OXYGEN THERAPY PER DAY

## 2018-11-01 PROCEDURE — 2580000003 HC RX 258: Performed by: PHYSICIAN ASSISTANT

## 2018-11-01 PROCEDURE — 84484 ASSAY OF TROPONIN QUANT: CPT

## 2018-11-01 PROCEDURE — 1210000000 HC MED SURG R&B

## 2018-11-01 PROCEDURE — 93970 EXTREMITY STUDY: CPT

## 2018-11-01 PROCEDURE — 86403 PARTICLE AGGLUT ANTBDY SCRN: CPT

## 2018-11-01 PROCEDURE — 6360000002 HC RX W HCPCS: Performed by: SURGERY

## 2018-11-01 PROCEDURE — 94640 AIRWAY INHALATION TREATMENT: CPT

## 2018-11-01 PROCEDURE — 0JBR0ZZ EXCISION OF LEFT FOOT SUBCUTANEOUS TISSUE AND FASCIA, OPEN APPROACH: ICD-10-PCS | Performed by: SURGERY

## 2018-11-01 PROCEDURE — 82948 REAGENT STRIP/BLOOD GLUCOSE: CPT

## 2018-11-01 PROCEDURE — 36415 COLL VENOUS BLD VENIPUNCTURE: CPT

## 2018-11-01 PROCEDURE — 87205 SMEAR GRAM STAIN: CPT

## 2018-11-01 PROCEDURE — 87186 SC STD MICRODIL/AGAR DIL: CPT

## 2018-11-01 PROCEDURE — 6360000002 HC RX W HCPCS: Performed by: PHYSICIAN ASSISTANT

## 2018-11-01 PROCEDURE — 3700000000 HC ANESTHESIA ATTENDED CARE: Performed by: SURGERY

## 2018-11-01 PROCEDURE — 6370000000 HC RX 637 (ALT 250 FOR IP): Performed by: INTERNAL MEDICINE

## 2018-11-01 PROCEDURE — 6360000002 HC RX W HCPCS: Performed by: HOSPITALIST

## 2018-11-01 PROCEDURE — 2500000003 HC RX 250 WO HCPCS: Performed by: NURSE ANESTHETIST, CERTIFIED REGISTERED

## 2018-11-01 PROCEDURE — 2580000003 HC RX 258: Performed by: NURSE ANESTHETIST, CERTIFIED REGISTERED

## 2018-11-01 PROCEDURE — 2580000003 HC RX 258: Performed by: HOSPITALIST

## 2018-11-01 PROCEDURE — 2709999900 HC NON-CHARGEABLE SUPPLY: Performed by: SURGERY

## 2018-11-01 PROCEDURE — 6370000000 HC RX 637 (ALT 250 FOR IP): Performed by: PHYSICIAN ASSISTANT

## 2018-11-01 PROCEDURE — 7100000000 HC PACU RECOVERY - FIRST 15 MIN: Performed by: SURGERY

## 2018-11-01 PROCEDURE — 3700000001 HC ADD 15 MINUTES (ANESTHESIA): Performed by: SURGERY

## 2018-11-01 PROCEDURE — 80053 COMPREHEN METABOLIC PANEL: CPT

## 2018-11-01 PROCEDURE — 2580000003 HC RX 258: Performed by: SURGERY

## 2018-11-01 PROCEDURE — 3600000013 HC SURGERY LEVEL 3 ADDTL 15MIN: Performed by: SURGERY

## 2018-11-01 PROCEDURE — 7100000001 HC PACU RECOVERY - ADDTL 15 MIN: Performed by: SURGERY

## 2018-11-01 RX ORDER — METHYLENE BLUE 10 MG/ML
INJECTION INTRAVENOUS PRN
Status: DISCONTINUED | OUTPATIENT
Start: 2018-11-01 | End: 2018-11-01 | Stop reason: HOSPADM

## 2018-11-01 RX ORDER — SODIUM CHLORIDE, SODIUM LACTATE, POTASSIUM CHLORIDE, CALCIUM CHLORIDE 600; 310; 30; 20 MG/100ML; MG/100ML; MG/100ML; MG/100ML
INJECTION, SOLUTION INTRAVENOUS CONTINUOUS
Status: DISCONTINUED | OUTPATIENT
Start: 2018-11-01 | End: 2018-11-01

## 2018-11-01 RX ORDER — SODIUM HYPOCHLORITE 1.25 MG/ML
SOLUTION TOPICAL PRN
Status: DISCONTINUED | OUTPATIENT
Start: 2018-11-01 | End: 2018-11-01 | Stop reason: HOSPADM

## 2018-11-01 RX ORDER — PROMETHAZINE HYDROCHLORIDE 25 MG/ML
6.25 INJECTION, SOLUTION INTRAMUSCULAR; INTRAVENOUS
Status: DISCONTINUED | OUTPATIENT
Start: 2018-11-01 | End: 2018-11-01

## 2018-11-01 RX ORDER — FENTANYL CITRATE 50 UG/ML
50 INJECTION, SOLUTION INTRAMUSCULAR; INTRAVENOUS
Status: DISCONTINUED | OUTPATIENT
Start: 2018-11-01 | End: 2018-11-01

## 2018-11-01 RX ORDER — DEXAMETHASONE SODIUM PHOSPHATE 10 MG/ML
INJECTION INTRAMUSCULAR; INTRAVENOUS PRN
Status: DISCONTINUED | OUTPATIENT
Start: 2018-11-01 | End: 2018-11-01 | Stop reason: SDUPTHER

## 2018-11-01 RX ORDER — METOCLOPRAMIDE HYDROCHLORIDE 5 MG/ML
10 INJECTION INTRAMUSCULAR; INTRAVENOUS
Status: DISCONTINUED | OUTPATIENT
Start: 2018-11-01 | End: 2018-11-01

## 2018-11-01 RX ORDER — LABETALOL HYDROCHLORIDE 5 MG/ML
5 INJECTION, SOLUTION INTRAVENOUS EVERY 10 MIN PRN
Status: DISCONTINUED | OUTPATIENT
Start: 2018-11-01 | End: 2018-11-01

## 2018-11-01 RX ORDER — FENTANYL CITRATE 50 UG/ML
INJECTION, SOLUTION INTRAMUSCULAR; INTRAVENOUS PRN
Status: DISCONTINUED | OUTPATIENT
Start: 2018-11-01 | End: 2018-11-01 | Stop reason: SDUPTHER

## 2018-11-01 RX ORDER — ROCURONIUM BROMIDE 10 MG/ML
INJECTION, SOLUTION INTRAVENOUS PRN
Status: DISCONTINUED | OUTPATIENT
Start: 2018-11-01 | End: 2018-11-01 | Stop reason: SDUPTHER

## 2018-11-01 RX ORDER — SODIUM CHLORIDE 0.9 % (FLUSH) 0.9 %
10 SYRINGE (ML) INJECTION EVERY 12 HOURS SCHEDULED
Status: DISCONTINUED | OUTPATIENT
Start: 2018-11-01 | End: 2018-11-01

## 2018-11-01 RX ORDER — DIPHENHYDRAMINE HYDROCHLORIDE 50 MG/ML
12.5 INJECTION INTRAMUSCULAR; INTRAVENOUS
Status: DISCONTINUED | OUTPATIENT
Start: 2018-11-01 | End: 2018-11-01

## 2018-11-01 RX ORDER — MIDAZOLAM HYDROCHLORIDE 1 MG/ML
2 INJECTION INTRAMUSCULAR; INTRAVENOUS
Status: DISCONTINUED | OUTPATIENT
Start: 2018-11-01 | End: 2018-11-01

## 2018-11-01 RX ORDER — MORPHINE SULFATE/0.9% NACL/PF 1 MG/ML
4 SYRINGE (ML) INJECTION EVERY 5 MIN PRN
Status: DISCONTINUED | OUTPATIENT
Start: 2018-11-01 | End: 2018-11-01

## 2018-11-01 RX ORDER — SODIUM HYPOCHLORITE 1.25 MG/ML
SOLUTION TOPICAL DAILY
Status: DISCONTINUED | OUTPATIENT
Start: 2018-11-01 | End: 2018-11-04 | Stop reason: HOSPADM

## 2018-11-01 RX ORDER — SCOLOPAMINE TRANSDERMAL SYSTEM 1 MG/1
1 PATCH, EXTENDED RELEASE TRANSDERMAL ONCE
Status: DISCONTINUED | OUTPATIENT
Start: 2018-11-01 | End: 2018-11-01

## 2018-11-01 RX ORDER — MORPHINE SULFATE/0.9% NACL/PF 1 MG/ML
2 SYRINGE (ML) INJECTION EVERY 5 MIN PRN
Status: DISCONTINUED | OUTPATIENT
Start: 2018-11-01 | End: 2018-11-01

## 2018-11-01 RX ORDER — SODIUM POLYSTYRENE SULFONATE 15 G/60ML
15 SUSPENSION ORAL; RECTAL ONCE
Status: COMPLETED | OUTPATIENT
Start: 2018-11-01 | End: 2018-11-01

## 2018-11-01 RX ORDER — PROPOFOL 10 MG/ML
INJECTION, EMULSION INTRAVENOUS PRN
Status: DISCONTINUED | OUTPATIENT
Start: 2018-11-01 | End: 2018-11-01 | Stop reason: SDUPTHER

## 2018-11-01 RX ORDER — LIDOCAINE HYDROCHLORIDE 10 MG/ML
1 INJECTION, SOLUTION EPIDURAL; INFILTRATION; INTRACAUDAL; PERINEURAL
Status: DISCONTINUED | OUTPATIENT
Start: 2018-11-01 | End: 2018-11-01

## 2018-11-01 RX ORDER — HYDRALAZINE HYDROCHLORIDE 20 MG/ML
5 INJECTION INTRAMUSCULAR; INTRAVENOUS EVERY 10 MIN PRN
Status: DISCONTINUED | OUTPATIENT
Start: 2018-11-01 | End: 2018-11-01

## 2018-11-01 RX ORDER — SODIUM CHLORIDE, SODIUM LACTATE, POTASSIUM CHLORIDE, CALCIUM CHLORIDE 600; 310; 30; 20 MG/100ML; MG/100ML; MG/100ML; MG/100ML
INJECTION, SOLUTION INTRAVENOUS CONTINUOUS PRN
Status: DISCONTINUED | OUTPATIENT
Start: 2018-11-01 | End: 2018-11-01 | Stop reason: SDUPTHER

## 2018-11-01 RX ORDER — MEPERIDINE HYDROCHLORIDE 50 MG/ML
12.5 INJECTION INTRAMUSCULAR; INTRAVENOUS; SUBCUTANEOUS EVERY 5 MIN PRN
Status: DISCONTINUED | OUTPATIENT
Start: 2018-11-01 | End: 2018-11-01

## 2018-11-01 RX ORDER — EPHEDRINE SULFATE 50 MG/ML
INJECTION, SOLUTION INTRAVENOUS PRN
Status: DISCONTINUED | OUTPATIENT
Start: 2018-11-01 | End: 2018-11-01 | Stop reason: SDUPTHER

## 2018-11-01 RX ORDER — SODIUM CHLORIDE 0.9 % (FLUSH) 0.9 %
10 SYRINGE (ML) INJECTION PRN
Status: DISCONTINUED | OUTPATIENT
Start: 2018-11-01 | End: 2018-11-01

## 2018-11-01 RX ORDER — LIDOCAINE HYDROCHLORIDE 10 MG/ML
INJECTION, SOLUTION INFILTRATION; PERINEURAL PRN
Status: DISCONTINUED | OUTPATIENT
Start: 2018-11-01 | End: 2018-11-01 | Stop reason: SDUPTHER

## 2018-11-01 RX ORDER — INSULIN GLARGINE 100 [IU]/ML
20 INJECTION, SOLUTION SUBCUTANEOUS NIGHTLY
Status: DISCONTINUED | OUTPATIENT
Start: 2018-11-01 | End: 2018-11-02

## 2018-11-01 RX ORDER — MORPHINE SULFATE 4 MG/ML
4 INJECTION, SOLUTION INTRAMUSCULAR; INTRAVENOUS
Status: DISCONTINUED | OUTPATIENT
Start: 2018-11-01 | End: 2018-11-01

## 2018-11-01 RX ORDER — ONDANSETRON 2 MG/ML
INJECTION INTRAMUSCULAR; INTRAVENOUS PRN
Status: DISCONTINUED | OUTPATIENT
Start: 2018-11-01 | End: 2018-11-01 | Stop reason: SDUPTHER

## 2018-11-01 RX ADMIN — Medication 15 G: at 13:53

## 2018-11-01 RX ADMIN — CLOPIDOGREL BISULFATE 75 MG: 75 TABLET ORAL at 12:05

## 2018-11-01 RX ADMIN — EPHEDRINE SULFATE 10 MG: 50 INJECTION, SOLUTION INTRAMUSCULAR; INTRAVENOUS; SUBCUTANEOUS at 09:53

## 2018-11-01 RX ADMIN — MULTIPLE VITAMINS W/ MINERALS TAB 1 TABLET: TAB at 12:05

## 2018-11-01 RX ADMIN — INSULIN GLARGINE 8 UNITS: 100 INJECTION, SOLUTION SUBCUTANEOUS at 00:23

## 2018-11-01 RX ADMIN — PROPOFOL 50 MG: 10 INJECTION, EMULSION INTRAVENOUS at 09:41

## 2018-11-01 RX ADMIN — ROCURONIUM BROMIDE 40 MG: 10 INJECTION INTRAVENOUS at 09:42

## 2018-11-01 RX ADMIN — APIXABAN 5 MG: 5 TABLET, FILM COATED ORAL at 12:04

## 2018-11-01 RX ADMIN — EPHEDRINE SULFATE 10 MG: 50 INJECTION, SOLUTION INTRAMUSCULAR; INTRAVENOUS; SUBCUTANEOUS at 09:58

## 2018-11-01 RX ADMIN — MEROPENEM 1 G: 1 INJECTION, POWDER, FOR SOLUTION INTRAVENOUS at 03:31

## 2018-11-01 RX ADMIN — PROPOFOL 150 MG: 10 INJECTION, EMULSION INTRAVENOUS at 09:36

## 2018-11-01 RX ADMIN — DAKIN'S SOLUTION 0.125% (QUARTER STRENGTH): 0.12 SOLUTION at 12:08

## 2018-11-01 RX ADMIN — FENTANYL CITRATE 50 MCG: 50 INJECTION INTRAMUSCULAR; INTRAVENOUS at 09:36

## 2018-11-01 RX ADMIN — INSULIN LISPRO 3 UNITS: 100 INJECTION, SOLUTION INTRAVENOUS; SUBCUTANEOUS at 12:08

## 2018-11-01 RX ADMIN — LIDOCAINE HYDROCHLORIDE 50 MG: 10 INJECTION, SOLUTION INFILTRATION; PERINEURAL at 09:36

## 2018-11-01 RX ADMIN — ONDANSETRON HYDROCHLORIDE 4 MG: 2 INJECTION, SOLUTION INTRAMUSCULAR; INTRAVENOUS at 10:16

## 2018-11-01 RX ADMIN — INSULIN LISPRO 3 UNITS: 100 INJECTION, SOLUTION INTRAVENOUS; SUBCUTANEOUS at 21:20

## 2018-11-01 RX ADMIN — MEROPENEM 1 G: 1 INJECTION, POWDER, FOR SOLUTION INTRAVENOUS at 17:16

## 2018-11-01 RX ADMIN — IPRATROPIUM BROMIDE AND ALBUTEROL SULFATE 1 AMPULE: .5; 3 SOLUTION RESPIRATORY (INHALATION) at 19:06

## 2018-11-01 RX ADMIN — IPRATROPIUM BROMIDE AND ALBUTEROL SULFATE 1 AMPULE: .5; 3 SOLUTION RESPIRATORY (INHALATION) at 06:48

## 2018-11-01 RX ADMIN — DEXAMETHASONE SODIUM PHOSPHATE 10 MG: 10 INJECTION INTRAMUSCULAR; INTRAVENOUS at 09:50

## 2018-11-01 RX ADMIN — APIXABAN 5 MG: 5 TABLET, FILM COATED ORAL at 21:19

## 2018-11-01 RX ADMIN — INSULIN GLARGINE 20 UNITS: 100 INJECTION, SOLUTION SUBCUTANEOUS at 18:50

## 2018-11-01 RX ADMIN — FENTANYL CITRATE 25 MCG: 50 INJECTION INTRAMUSCULAR; INTRAVENOUS at 10:05

## 2018-11-01 RX ADMIN — VANCOMYCIN HYDROCHLORIDE 1250 MG: 10 INJECTION, POWDER, LYOPHILIZED, FOR SOLUTION INTRAVENOUS at 13:53

## 2018-11-01 RX ADMIN — INSULIN LISPRO 6 UNITS: 100 INJECTION, SOLUTION INTRAVENOUS; SUBCUTANEOUS at 17:18

## 2018-11-01 RX ADMIN — AMLODIPINE BESYLATE 10 MG: 5 TABLET ORAL at 12:05

## 2018-11-01 RX ADMIN — SODIUM CHLORIDE, SODIUM LACTATE, POTASSIUM CHLORIDE, AND CALCIUM CHLORIDE: 600; 310; 30; 20 INJECTION, SOLUTION INTRAVENOUS at 08:39

## 2018-11-01 RX ADMIN — SUGAMMADEX 300 MG: 100 INJECTION, SOLUTION INTRAVENOUS at 10:19

## 2018-11-01 RX ADMIN — SODIUM CHLORIDE, SODIUM LACTATE, POTASSIUM CHLORIDE, AND CALCIUM CHLORIDE: 600; 310; 30; 20 INJECTION, SOLUTION INTRAVENOUS at 09:31

## 2018-11-01 ASSESSMENT — PAIN SCALES - GENERAL: PAINLEVEL_OUTOF10: 0

## 2018-11-01 ASSESSMENT — LIFESTYLE VARIABLES: SMOKING_STATUS: 0

## 2018-11-01 ASSESSMENT — ENCOUNTER SYMPTOMS: SHORTNESS OF BREATH: 0

## 2018-11-01 NOTE — PROGRESS NOTES
phalanx  of the great toe. A probable old healed or healing fracture of the base of the proximal  phalanx of the third toe. Mild diffuse osteopenia particularly in the periarticular region may  represent an inflammatory arthritis/hyperemia of the region. Other findings as above. Micro: Surgical cultures obtained 11/01/2018 pending  Blood cultures obtained 10/31/2018 pending  Urine culture no growth collected 10/31/2018    Assessment/Plan   Principal Problem:    Diabetic foot infection ([address]  Active Problems:    Hyperkalemia    Diabetes mellitus (HCC)    Chronic diastolic congestive heart failure (HCC)    Obesity due to excess calories    Coronary artery disease involving native coronary artery without angina pectoris    Complete heart block (HCC)    Glaucoma    Pure hypercholesterolemia    Cellulitis of both lower extremities    Benign prostatic hyperplasia    CKD (chronic kidney disease) stage 3, GFR 30-59 ml/min (HCC)    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 *     Doing well postoperatively. Potassium is elevated, will give Kayexalate and recheck. Mildly diminished breath sounds at bases-will add Incentive spirometry. Hx of CHF will monitor I/O's closely as well as creatinine given known CKD 3. Further orders per attending.      Antibiotic: Merrem, Vancomycin     DVT Prophylaxis: Miguel Jean PA-C

## 2018-11-01 NOTE — ANESTHESIA POSTPROCEDURE EVALUATION
Department of Anesthesiology  Postprocedure Note    Patient: Trudy Pisano  MRN: [id number]  YOB: 1925  Date of evaluation: 11/1/2018  Time:  10:34 AM     Procedure Summary     Date:  11/01/18 Room / Location:  [facility] OR  / [facility] OR    Anesthesia Start:  1107 Anesthesia Stop:  1034    Procedure:  INCISION AND DRAINAGE AND EXCISIONAL DEBRIDEMENT OF SKIN AND SUBCUTANEOUS TISSUE OF LEFT FOOT ABSCESS [id number] (Left ) Diagnosis:  (E11.628, L08.9)    Surgeon:  Johnnie Thrasher MD Responsible Provider:  SHRUTI Rios CRNA    Anesthesia Type:  general, regional ASA Status:  4          Anesthesia Type: general, regional    Narinder Phase I: Narinder Score: 7    Narinder Phase II:      Last vitals: Reviewed and per EMR flowsheets.        Anesthesia Post Evaluation    Patient location during evaluation: PACU  Patient participation: complete - patient participated  Level of consciousness: awake and alert  Pain score: 0  Airway patency: patent  Nausea & Vomiting: no nausea and no vomiting  Complications: no  Cardiovascular status: blood pressure returned to baseline  Respiratory status: acceptable, spontaneous ventilation and nasal cannula  Hydration status: stable

## 2018-11-01 NOTE — ANESTHESIA PRE PROCEDURE
capsule by mouth daily Pt unsure of dose. States it changes because he gets whatever is on sale.    Yes Historical Provider, MD   Multiple Vitamins-Minerals (THERAPEUTIC MULTIVITAMIN-MINERALS) tablet Take 1 tablet by mouth daily   Yes Historical Provider, MD   meclizine (ANTIVERT) 12.5 MG tablet Take 12.5 mg by mouth 3 times daily as needed   Yes Historical Provider, MD   nitroGLYCERIN (NITROSTAT) 0.4 MG SL tablet Place 0.4 mg under the tongue every 5 minutes as needed for Chest pain   Yes Historical Provider, MD   polyethylene glycol (GLYCOLAX) powder Take 17 g by mouth daily 5/17/18   Leonard Valdivia MD       Current medications:    Current Facility-Administered Medications   Medication Dose Route Frequency Provider Last Rate Last Dose    [MAR Hold] vancomycin (VANCOCIN) intermittent dosing (placeholder)   Other RX Placeholder Amalia Buenrostro MD        [facility] Hold] vancomycin (VANCOCIN) 1,250 mg in dextrose 5 % 250 mL IVPB  1,250 mg Intravenous Q24H Amalia Buenrostro MD   Stopped at 10/31/18 1743    [MAR Hold] insulin lispro (HUMALOG) injection vial 0-6 Units  0-6 Units Subcutaneous TID WC Alyson Olivera MD   6 Units at 10/31/18 1718    [MAR Hold] insulin lispro (HUMALOG) injection vial 0-3 Units  0-3 Units Subcutaneous Nightly Alyson Olivera MD   3 Units at 10/31/18 2046    [MAR Hold] ipratropium-albuterol (DUONEB) nebulizer solution 1 ampule  1 ampule Inhalation Q4H WA Alyson Olivera MD   1 ampule at 11/01/18 0648    [MAR Hold] glucose (GLUTOSE) 40 % oral gel 15 g  15 g Oral PRN Alyson Olivera MD        [MAR Hold] dextrose 50 % solution 12.5 g  12.5 g Intravenous PRN Alyson Olivera MD        [MAR Hold] glucagon (rDNA) injection 1 mg  1 mg Intramuscular PRN Alyson Olivera MD        [MAR Hold] dextrose 5 % solution  100 mL/hr Intravenous PRN Alyson Olivera MD        [facility]] 0.9 % sodium chloride infusion   Intravenous Continuous Alyson Olivera MD 50 mL/hr at 10/31/18 2126     [facility] Hold]

## 2018-11-02 PROBLEM — E87.5 HYPERKALEMIA: Status: RESOLVED | Noted: 2018-11-01 | Resolved: 2018-11-02

## 2018-11-02 LAB
ANION GAP SERPL CALCULATED.3IONS-SCNC: 13 MMOL/L (ref 7–19)
BUN BLDV-MCNC: 28 MG/DL (ref 8–23)
CALCIUM SERPL-MCNC: 8.4 MG/DL (ref 8.2–9.6)
CHLORIDE BLD-SCNC: 101 MMOL/L (ref 98–111)
CO2: 23 MMOL/L (ref 22–29)
CREAT SERPL-MCNC: 1.3 MG/DL (ref 0.5–1.2)
GFR NON-AFRICAN AMERICAN: 51
GLUCOSE BLD-MCNC: 352 MG/DL (ref 70–99)
GLUCOSE BLD-MCNC: 377 MG/DL (ref 70–99)
GLUCOSE BLD-MCNC: 421 MG/DL (ref 70–99)
GLUCOSE BLD-MCNC: 442 MG/DL (ref 70–99)
GLUCOSE BLD-MCNC: 451 MG/DL (ref 74–109)
GLUCOSE BLD-MCNC: 462 MG/DL (ref 70–99)
GLUCOSE BLD-MCNC: 522 MG/DL (ref 70–99)
HCT VFR BLD CALC: 32.9 % (ref 42–52)
HEMOGLOBIN: 10.7 G/DL (ref 14–18)
MCH RBC QN AUTO: 30.9 PG (ref 27–31)
MCHC RBC AUTO-ENTMCNC: 32.5 G/DL (ref 33–37)
MCV RBC AUTO: 95.1 FL (ref 80–94)
PDW BLD-RTO: 12.7 % (ref 11.5–14.5)
PERFORMED ON: ABNORMAL
PLATELET # BLD: 309 K/UL (ref 130–400)
PMV BLD AUTO: 10.7 FL (ref 9.4–12.4)
POTASSIUM SERPL-SCNC: 4.5 MMOL/L (ref 3.5–5)
RBC # BLD: 3.46 M/UL (ref 4.7–6.1)
SODIUM BLD-SCNC: 137 MMOL/L (ref 136–145)
URINE CULTURE, ROUTINE: NORMAL
WBC # BLD: 11 K/UL (ref 4.8–10.8)

## 2018-11-02 PROCEDURE — 6360000002 HC RX W HCPCS: Performed by: HOSPITALIST

## 2018-11-02 PROCEDURE — 93922 UPR/L XTREMITY ART 2 LEVELS: CPT

## 2018-11-02 PROCEDURE — 99024 POSTOP FOLLOW-UP VISIT: CPT | Performed by: NURSE PRACTITIONER

## 2018-11-02 PROCEDURE — 99232 SBSQ HOSP IP/OBS MODERATE 35: CPT | Performed by: PHYSICIAN ASSISTANT

## 2018-11-02 PROCEDURE — 80048 BASIC METABOLIC PNL TOTAL CA: CPT

## 2018-11-02 PROCEDURE — 82948 REAGENT STRIP/BLOOD GLUCOSE: CPT

## 2018-11-02 PROCEDURE — 6370000000 HC RX 637 (ALT 250 FOR IP): Performed by: INTERNAL MEDICINE

## 2018-11-02 PROCEDURE — 2700000000 HC OXYGEN THERAPY PER DAY

## 2018-11-02 PROCEDURE — 94640 AIRWAY INHALATION TREATMENT: CPT

## 2018-11-02 PROCEDURE — 6360000002 HC RX W HCPCS: Performed by: PHYSICIAN ASSISTANT

## 2018-11-02 PROCEDURE — 2580000003 HC RX 258: Performed by: HOSPITALIST

## 2018-11-02 PROCEDURE — 36415 COLL VENOUS BLD VENIPUNCTURE: CPT

## 2018-11-02 PROCEDURE — 2580000003 HC RX 258: Performed by: PHYSICIAN ASSISTANT

## 2018-11-02 PROCEDURE — 1210000000 HC MED SURG R&B

## 2018-11-02 PROCEDURE — 85027 COMPLETE CBC AUTOMATED: CPT

## 2018-11-02 RX ORDER — INSULIN GLARGINE 100 [IU]/ML
40 INJECTION, SOLUTION SUBCUTANEOUS NIGHTLY
Status: DISCONTINUED | OUTPATIENT
Start: 2018-11-02 | End: 2018-11-04 | Stop reason: HOSPADM

## 2018-11-02 RX ADMIN — APIXABAN 5 MG: 5 TABLET, FILM COATED ORAL at 22:42

## 2018-11-02 RX ADMIN — AMLODIPINE BESYLATE 10 MG: 5 TABLET ORAL at 11:40

## 2018-11-02 RX ADMIN — DAKIN'S SOLUTION 0.125% (QUARTER STRENGTH): 0.12 SOLUTION at 08:00

## 2018-11-02 RX ADMIN — INSULIN GLARGINE 40 UNITS: 100 INJECTION, SOLUTION SUBCUTANEOUS at 22:42

## 2018-11-02 RX ADMIN — IPRATROPIUM BROMIDE AND ALBUTEROL SULFATE 1 AMPULE: .5; 3 SOLUTION RESPIRATORY (INHALATION) at 06:20

## 2018-11-02 RX ADMIN — IPRATROPIUM BROMIDE AND ALBUTEROL SULFATE 1 AMPULE: .5; 3 SOLUTION RESPIRATORY (INHALATION) at 14:44

## 2018-11-02 RX ADMIN — INSULIN LISPRO 3 UNITS: 100 INJECTION, SOLUTION INTRAVENOUS; SUBCUTANEOUS at 22:43

## 2018-11-02 RX ADMIN — INSULIN LISPRO 6 UNITS: 100 INJECTION, SOLUTION INTRAVENOUS; SUBCUTANEOUS at 13:35

## 2018-11-02 RX ADMIN — MEROPENEM 1 G: 1 INJECTION, POWDER, FOR SOLUTION INTRAVENOUS at 04:07

## 2018-11-02 RX ADMIN — CLOPIDOGREL BISULFATE 75 MG: 75 TABLET ORAL at 11:40

## 2018-11-02 RX ADMIN — IPRATROPIUM BROMIDE AND ALBUTEROL SULFATE 1 AMPULE: .5; 3 SOLUTION RESPIRATORY (INHALATION) at 10:10

## 2018-11-02 RX ADMIN — MULTIPLE VITAMINS W/ MINERALS TAB 1 TABLET: TAB at 11:40

## 2018-11-02 RX ADMIN — INSULIN HUMAN 5 UNITS: 100 INJECTION, SOLUTION PARENTERAL at 01:57

## 2018-11-02 RX ADMIN — INSULIN LISPRO 6 UNITS: 100 INJECTION, SOLUTION INTRAVENOUS; SUBCUTANEOUS at 17:59

## 2018-11-02 RX ADMIN — VANCOMYCIN HYDROCHLORIDE 1250 MG: 10 INJECTION, POWDER, LYOPHILIZED, FOR SOLUTION INTRAVENOUS at 15:21

## 2018-11-02 RX ADMIN — APIXABAN 5 MG: 5 TABLET, FILM COATED ORAL at 11:40

## 2018-11-02 RX ADMIN — IPRATROPIUM BROMIDE AND ALBUTEROL SULFATE 1 AMPULE: .5; 3 SOLUTION RESPIRATORY (INHALATION) at 19:36

## 2018-11-02 RX ADMIN — INSULIN LISPRO 7 UNITS: 100 INJECTION, SOLUTION INTRAVENOUS; SUBCUTANEOUS at 17:58

## 2018-11-02 RX ADMIN — MEROPENEM 1 G: 1 INJECTION, POWDER, FOR SOLUTION INTRAVENOUS at 17:58

## 2018-11-02 ASSESSMENT — PAIN SCALES - GENERAL: PAINLEVEL_OUTOF10: 0

## 2018-11-02 NOTE — PROGRESS NOTES
Vascular lab preliminary. Bilateral RHEA's completed. Right RHEA:  Non compressible  Left RHEA:  Non compressible  Good arterial flow to both feet. Final report pending.

## 2018-11-02 NOTE — PROGRESS NOTES
Vascular Surgery Rounding Note                                                     Dr.Timothy Schaffer    11/2/2018  7:47 AM  Antibiotic: Merrem 1gm q12h                   Vancomycin 1,250mg q24h    Subjective- No complaints, states foot feels     Objective-   Invalid input(s): 24H    Intake/Output Summary (Last 24 hours) at 11/02/18 0747  Last data filed at 11/02/18 0508   Gross per 24 hour   Intake             1440 ml   Output              850 ml   Net              590 ml     No intake/output data recorded. Physical Exam:  Awake, alert, appropriate Yes  /61   Pulse 72   Temp 97.9 °F (36.6 °C)   Resp 18   Ht 5' 8\" (1.727 m)   Wt 227 lb (103 kg)   SpO2 96%   BMI 34.52 kg/m²   Heart-regular rate and rhythm  Lung-clear bilaterally anterior, diminished lower lobe sounds  Extremities feet warm to touch/pink color bilaterally, mild edema bilateral LE's; Biphasic doppler signals DP area  Other Wounds-left medial arch with debrided wound, is beefy red/clean, some bloody drainage. Erythema much improved        Document Information     Wound   Left medial foot wound photo   11/02/2018 07:40     Gram Stain Result 11/01/2018  9:22 AM [facility]   Moderate WBC's (Polymorphonuclear)   Moderate Gram positive cocci  in pairs   Moderate Gram positive cocci  in clusters           Assessment/Plan:  Stable POD #1   IV abx  Sensitivities pending    Antibiotics continued after surgery due to: Infection -  [x]  Abscess    []  Pneumonia    []  Aspiration Pneumonia    []  Sepsis    [x]  Cellulitis    []  Positive culture    []  UTI    []  Osteomyelitis    []  Fungal infection    []  H.pylori    DVT prophylaxis: Eliquis BID          Seen with yesi santos. Wound looks good. Continue abx. Await final path.   No pain

## 2018-11-02 NOTE — PROGRESS NOTES
Physical Therapy  Name: Shemar Mendieta  MRN:  [id number]  Date of service:  11/2/2018  Attempted to see PT for PT eval, but pt. off the floor for testing. Will f/u at a later time.   Electronically signed by Bridget Hurley PT on 11/2/2018 at 2:45 PM

## 2018-11-02 NOTE — PROGRESS NOTES
[facility]      Patient:    Alfonzo Belle  YOB: 1925  Date of Service:  11/2/2018  MRN:    [id number]    Room:   [address] [place] [address]   PCP:    Gianluca Bhatt MD  Advance Directive:  Prior  Admit Date:   10/31/2018       Hospital Day:  2    Chief Complaint:  Follow up foot wound    Subjective:  Mr. Catalino Waldron is feeling well today. He has no new complaints. He is hoping to be discharged soon. Spoke about his WW[employer] days. Cumulative Hospital Course: The patient is a pleasant 80year old male who presents to the hospital with complaints of a left foot wound. He had been evaluated by his PCP who requested the patient be admitted for IV antibiotics and vascular surgery consult for possible I&D. The patient was admitted to the hospital and placed on broad spectrum antibiotics. I&D performed by Dr. Anna Marie Garcia on 11/1/18. Surgical cultures obtained. Review of Systems:   Constitutional / general:  Denies fever / chills / sweats  Head:  Denies headache / neck stiffness / trauma / visual change  Eyes:  Denies blurry vision / acute visual change or loss / itching / redness  ENT: Denies sore throat / hoarseness / nasal drainage / ear pain  CV:  Denies chest pain / palpitations/ orthopnea   Respiratory:  Denies cough / shortness of breath / sputum / hemoptysis  GI: Denies nausea / vomiting / abdominal pain / diarrhea / constipation  :  Denies dysuria / hesitancy / urgency / hematuria   Neuro: Denies paralysis / syncope / seizure / dysphagia / headache / paresthesias  Musculoskeletal:  Denies muscle weakness /joint stiffness / pain  Vascular: Denies edema / claudication / varicosities  Heme / endocrine: Denies easy bruising / bleeding / excessive sweating / heat or cold intolerance  Psychiatric:  Denies depression / anxiety / insomnia / mood changes  Skin:  + Wound. 14 point review of systems is negative except as specifically addressed above.     Objective:   VITALS:  /61   Pulse 72   Temp 1250  11/01/18   0827  11/02/18   0418   WBC  14.3*  10.8  11.0*   HGB  10.6*  10.8*  10.7*   PLT  231  269  309     Recent Labs      11/01/18   0827  11/01/18   1854  11/02/18 0418   NA  138  137  137   K  5.5*  4.8  4.5   CL  103  100  101   CO2  21*  23  23   BUN  29*  28*  28*   CREATININE  1.2  1.1  1.3*   GLUCOSE  304*  439*  451*     Recent Labs      10/31/18   1754  11/01/18   0827 11/01/18 1854   AST  17  16  15   ALT  13  12  12   BILITOT  0.4  0.4  0.4   ALKPHOS  107  92  100     Troponin T:   Recent Labs      10/31/18   1754  10/31/18   2344  11/01/18 0827   TROPONINI  <0.01  <0.01  <0.01     Pro-BNP: No results for input(s): BNP in the last 72 hours. INR: No results for input(s): INR in the last 72 hours. ABGs:   Lab Results   Component Value Date    PHART 7.420 08/24/2018    PO2ART 52.0 08/24/2018    ICL9TUM 33.0 08/24/2018     UA:  Recent Labs      10/31/18   1350   COLORU  YELLOW   PHUR  5.5   WBCUA  0   RBCUA  2   BACTERIA  NEGATIVE   CLARITYU  Clear   SPECGRAV  1.021   LEUKOCYTESUR  Negative   UROBILINOGEN  0.2   BILIRUBINUR  Negative   BLOODU  LARGE*   GLUCOSEU  >=1000*     Radiology:  CXR 10/31/2018  1. No dense infiltrate or effusion. 2. Mild coarse markings and bronchial wall thickening, stable.     XR left foot 10/31/2018  Questionable nondisplaced fracture of the distal phalanx  of the great toe. A probable old healed or healing fracture of the base of the proximal  phalanx of the third toe. Mild diffuse osteopenia particularly in the periarticular region may  represent an inflammatory arthritis/hyperemia of the region. Other findings as above.     Micro:   Surgical cultures obtained 11/01/2018   Gram Stain Result 11/01/2018  9:22 AM [facility]   Moderate WBC's (Polymorphonuclear)   Moderate Gram positive cocci  in pairs   Moderate Gram positive cocci  in clusters      Blood cultures obtained 10/31/2018   No growth to date.     Urine culture no growth collected

## 2018-11-03 PROBLEM — I26.99 PULMONARY EMBOLISM, BILATERAL (HCC): Status: RESOLVED | Noted: 2018-08-24 | Resolved: 2018-11-03

## 2018-11-03 LAB
ANION GAP SERPL CALCULATED.3IONS-SCNC: 14 MMOL/L (ref 7–19)
BUN BLDV-MCNC: 33 MG/DL (ref 8–23)
CALCIUM SERPL-MCNC: 8.2 MG/DL (ref 8.2–9.6)
CHLORIDE BLD-SCNC: 102 MMOL/L (ref 98–111)
CO2: 24 MMOL/L (ref 22–29)
CREAT SERPL-MCNC: 1.2 MG/DL (ref 0.5–1.2)
GFR NON-AFRICAN AMERICAN: 56
GLUCOSE BLD-MCNC: 197 MG/DL (ref 70–99)
GLUCOSE BLD-MCNC: 223 MG/DL (ref 70–99)
GLUCOSE BLD-MCNC: 305 MG/DL (ref 70–99)
GLUCOSE BLD-MCNC: 322 MG/DL (ref 74–109)
HCT VFR BLD CALC: 32.9 % (ref 42–52)
HEMOGLOBIN: 10.5 G/DL (ref 14–18)
MCH RBC QN AUTO: 30.7 PG (ref 27–31)
MCHC RBC AUTO-ENTMCNC: 31.9 G/DL (ref 33–37)
MCV RBC AUTO: 96.2 FL (ref 80–94)
PDW BLD-RTO: 13 % (ref 11.5–14.5)
PERFORMED ON: ABNORMAL
PLATELET # BLD: 311 K/UL (ref 130–400)
PMV BLD AUTO: 10.7 FL (ref 9.4–12.4)
POTASSIUM SERPL-SCNC: 4.3 MMOL/L (ref 3.5–5)
RBC # BLD: 3.42 M/UL (ref 4.7–6.1)
SODIUM BLD-SCNC: 140 MMOL/L (ref 136–145)
VANCOMYCIN TROUGH: 12.8 UG/ML (ref 10–20)
WBC # BLD: 10.9 K/UL (ref 4.8–10.8)

## 2018-11-03 PROCEDURE — 36415 COLL VENOUS BLD VENIPUNCTURE: CPT

## 2018-11-03 PROCEDURE — 1210000000 HC MED SURG R&B

## 2018-11-03 PROCEDURE — 6370000000 HC RX 637 (ALT 250 FOR IP): Performed by: INTERNAL MEDICINE

## 2018-11-03 PROCEDURE — 6360000002 HC RX W HCPCS: Performed by: SURGERY

## 2018-11-03 PROCEDURE — 94640 AIRWAY INHALATION TREATMENT: CPT

## 2018-11-03 PROCEDURE — 2700000000 HC OXYGEN THERAPY PER DAY

## 2018-11-03 PROCEDURE — 82948 REAGENT STRIP/BLOOD GLUCOSE: CPT

## 2018-11-03 PROCEDURE — 99232 SBSQ HOSP IP/OBS MODERATE 35: CPT | Performed by: NURSE PRACTITIONER

## 2018-11-03 PROCEDURE — 85027 COMPLETE CBC AUTOMATED: CPT

## 2018-11-03 PROCEDURE — 80202 ASSAY OF VANCOMYCIN: CPT

## 2018-11-03 PROCEDURE — 2580000003 HC RX 258: Performed by: PHYSICIAN ASSISTANT

## 2018-11-03 PROCEDURE — 80048 BASIC METABOLIC PNL TOTAL CA: CPT

## 2018-11-03 PROCEDURE — 6370000000 HC RX 637 (ALT 250 FOR IP): Performed by: NURSE PRACTITIONER

## 2018-11-03 PROCEDURE — 99024 POSTOP FOLLOW-UP VISIT: CPT | Performed by: SURGERY

## 2018-11-03 PROCEDURE — 2580000003 HC RX 258: Performed by: SURGERY

## 2018-11-03 PROCEDURE — 6360000002 HC RX W HCPCS: Performed by: PHYSICIAN ASSISTANT

## 2018-11-03 RX ADMIN — VANCOMYCIN HYDROCHLORIDE 1500 MG: 10 INJECTION, POWDER, LYOPHILIZED, FOR SOLUTION INTRAVENOUS at 18:42

## 2018-11-03 RX ADMIN — INSULIN LISPRO 7 UNITS: 100 INJECTION, SOLUTION INTRAVENOUS; SUBCUTANEOUS at 17:52

## 2018-11-03 RX ADMIN — INSULIN LISPRO 4 UNITS: 100 INJECTION, SOLUTION INTRAVENOUS; SUBCUTANEOUS at 08:00

## 2018-11-03 RX ADMIN — APIXABAN 5 MG: 5 TABLET, FILM COATED ORAL at 08:30

## 2018-11-03 RX ADMIN — IPRATROPIUM BROMIDE AND ALBUTEROL SULFATE 1 AMPULE: .5; 3 SOLUTION RESPIRATORY (INHALATION) at 10:57

## 2018-11-03 RX ADMIN — MULTIPLE VITAMINS W/ MINERALS TAB 1 TABLET: TAB at 08:30

## 2018-11-03 RX ADMIN — IPRATROPIUM BROMIDE AND ALBUTEROL SULFATE 1 AMPULE: .5; 3 SOLUTION RESPIRATORY (INHALATION) at 18:48

## 2018-11-03 RX ADMIN — INSULIN LISPRO 7 UNITS: 100 INJECTION, SOLUTION INTRAVENOUS; SUBCUTANEOUS at 13:29

## 2018-11-03 RX ADMIN — DAKIN'S SOLUTION 0.125% (QUARTER STRENGTH): 0.12 SOLUTION at 13:30

## 2018-11-03 RX ADMIN — MEROPENEM 1 G: 1 INJECTION, POWDER, FOR SOLUTION INTRAVENOUS at 04:24

## 2018-11-03 RX ADMIN — INSULIN LISPRO 12 UNITS: 100 INJECTION, SOLUTION INTRAVENOUS; SUBCUTANEOUS at 13:29

## 2018-11-03 RX ADMIN — IPRATROPIUM BROMIDE AND ALBUTEROL SULFATE 1 AMPULE: .5; 3 SOLUTION RESPIRATORY (INHALATION) at 06:58

## 2018-11-03 RX ADMIN — APIXABAN 5 MG: 5 TABLET, FILM COATED ORAL at 20:16

## 2018-11-03 RX ADMIN — IPRATROPIUM BROMIDE AND ALBUTEROL SULFATE 1 AMPULE: .5; 3 SOLUTION RESPIRATORY (INHALATION) at 15:36

## 2018-11-03 RX ADMIN — CLOPIDOGREL BISULFATE 75 MG: 75 TABLET ORAL at 08:30

## 2018-11-03 RX ADMIN — INSULIN GLARGINE 40 UNITS: 100 INJECTION, SOLUTION SUBCUTANEOUS at 21:11

## 2018-11-03 RX ADMIN — AMLODIPINE BESYLATE 10 MG: 5 TABLET ORAL at 08:30

## 2018-11-03 RX ADMIN — INSULIN LISPRO 3 UNITS: 100 INJECTION, SOLUTION INTRAVENOUS; SUBCUTANEOUS at 18:02

## 2018-11-03 RX ADMIN — MEROPENEM 1 G: 1 INJECTION, POWDER, FOR SOLUTION INTRAVENOUS at 17:52

## 2018-11-03 RX ADMIN — INSULIN LISPRO 3 UNITS: 100 INJECTION, SOLUTION INTRAVENOUS; SUBCUTANEOUS at 21:11

## 2018-11-03 RX ADMIN — INSULIN LISPRO 7 UNITS: 100 INJECTION, SOLUTION INTRAVENOUS; SUBCUTANEOUS at 09:01

## 2018-11-03 ASSESSMENT — PAIN SCALES - GENERAL
PAINLEVEL_OUTOF10: 0
PAINLEVEL_OUTOF10: 0

## 2018-11-03 NOTE — PROGRESS NOTES
Changed patient's dressing to left foot. There was moderate amount of sanguinous drainage from foot. Patient tolerated well. Patient is alert and oriented. Bed in low position. Call light within reach.

## 2018-11-03 NOTE — PROGRESS NOTES
Vascular Surgery Rounding Note                                                     Dr.Timothy Schaffer    11/3/2018  10:42 AM  Antibiotic: Merrem 1gm q12h                   Vancomycin 1,250mg q24h    Subjective- No complaints, foot feeling much better    Objective-   Invalid input(s): 24H    Intake/Output Summary (Last 24 hours) at 11/03/18 1042  Last data filed at 11/03/18 0928   Gross per 24 hour   Intake             2640 ml   Output                0 ml   Net             2640 ml     In: 600 [P.O.:600]  Out: -     Physical Exam:  Awake, alert, appropriate Yes  BP (!) 146/71   Pulse 72   Temp 98.4 °F (36.9 °C) (Temporal)   Resp 18   Ht 5' 8\" (1.727 m)   Wt 227 lb (103 kg)   SpO2 95%   BMI 34.52 kg/m²   Heart-regular rate and rhythm  Lung-clear bilaterally anterior, diminished lower lobe sounds  Extremities feet warm to touch/pink color bilaterally, mild edema bilateral LE's; Biphasic doppler signals DP area  Other Wounds-left medial arch with debrided wound, is beefy red/clean, some bloody drainage. Erythema gone. Document Information     Wound   Left medial foot wound photo   11/02/2018 07:40     Gram Stain Result 11/01/2018  9:22 AM [address] [facility]   Moderate WBC's (Polymorphonuclear)   Moderate Gram positive cocci  in pairs   Moderate Gram positive cocci  in clusters           Assessment/Plan:  Stable POD #2   IV abx  Sensitivities pending, prelim Gm+  Glucose control- hospitalists. Antibiotics continued after surgery due to:   Infection -  [x]  Abscess    []  Pneumonia    []  Aspiration Pneumonia    []  Sepsis    [x]  Cellulitis    []  Positive culture    []  UTI    []  Osteomyelitis    []  Fungal infection    []  H.pylori    DVT prophylaxis: Eliquis BID

## 2018-11-03 NOTE — PROGRESS NOTES
Pharmacy Vancomycin Consult     Vancomycin Day: 4  Current Dosin mg IV q24h    Temp max: 98.6    Recent Labs      18   0418  18   0404   BUN  28*  33*       Recent Labs      18   0418  18   0404   CREATININE  1.3*  1.2       Recent Labs      18   0418  18   0404   WBC  11.0*  10.9*         Intake/Output Summary (Last 24 hours) at 18 1418  Last data filed at 18 1324   Gross per 24 hour   Intake 2400 ml   Output 100 ml   Net 2300 ml       Culture Date Source Results   10/31/18 Blood x 2 No growth   10/31/18 Urine No growth   18 Surgical Moderate Gram (+) cocci in pairs and clusters  Staph aureus  Staph coag (-)       Ht Readings from Last 1 Encounters:   18 5' 8\" (1.727 m)        Wt Readings from Last 1 Encounters:   18 227 lb (103 kg)         Body mass index is 34.52 kg/m². Estimated Creatinine Clearance: 45 mL/min (based on SCr of 1.2 mg/dL). Trough: 12.8 (~2 hrs early)    Assessment/Plan: Will increase to Vancomycin 1500 mg IV q24h with a Vancomycin trough level prior to 3rd dose of new regimen.     Electronically signed by BERNARD Almeida, 11/3/2018,2:36 PM

## 2018-11-04 VITALS
WEIGHT: 227 LBS | SYSTOLIC BLOOD PRESSURE: 151 MMHG | RESPIRATION RATE: 16 BRPM | OXYGEN SATURATION: 90 % | BODY MASS INDEX: 34.4 KG/M2 | TEMPERATURE: 97.2 F | DIASTOLIC BLOOD PRESSURE: 65 MMHG | HEART RATE: 64 BPM | HEIGHT: 68 IN

## 2018-11-04 LAB
GLUCOSE BLD-MCNC: 204 MG/DL (ref 70–99)
GLUCOSE BLD-MCNC: 257 MG/DL (ref 70–99)
PERFORMED ON: ABNORMAL
PERFORMED ON: ABNORMAL

## 2018-11-04 PROCEDURE — 2580000003 HC RX 258: Performed by: PHYSICIAN ASSISTANT

## 2018-11-04 PROCEDURE — 6360000002 HC RX W HCPCS: Performed by: PHYSICIAN ASSISTANT

## 2018-11-04 PROCEDURE — 6370000000 HC RX 637 (ALT 250 FOR IP): Performed by: INTERNAL MEDICINE

## 2018-11-04 PROCEDURE — 97161 PT EVAL LOW COMPLEX 20 MIN: CPT

## 2018-11-04 PROCEDURE — G8979 MOBILITY GOAL STATUS: HCPCS

## 2018-11-04 PROCEDURE — 94640 AIRWAY INHALATION TREATMENT: CPT

## 2018-11-04 PROCEDURE — 97530 THERAPEUTIC ACTIVITIES: CPT

## 2018-11-04 PROCEDURE — 99238 HOSP IP/OBS DSCHRG MGMT 30/<: CPT | Performed by: INTERNAL MEDICINE

## 2018-11-04 PROCEDURE — 82948 REAGENT STRIP/BLOOD GLUCOSE: CPT

## 2018-11-04 PROCEDURE — G8978 MOBILITY CURRENT STATUS: HCPCS

## 2018-11-04 PROCEDURE — 6370000000 HC RX 637 (ALT 250 FOR IP): Performed by: SURGERY

## 2018-11-04 PROCEDURE — 6370000000 HC RX 637 (ALT 250 FOR IP): Performed by: NURSE PRACTITIONER

## 2018-11-04 PROCEDURE — 99024 POSTOP FOLLOW-UP VISIT: CPT | Performed by: SURGERY

## 2018-11-04 RX ORDER — MINOCYCLINE HYDROCHLORIDE 50 MG/1
100 CAPSULE ORAL 2 TIMES DAILY
Status: DISCONTINUED | OUTPATIENT
Start: 2018-11-04 | End: 2018-11-04 | Stop reason: HOSPADM

## 2018-11-04 RX ORDER — MINOCYCLINE HYDROCHLORIDE 100 MG/1
100 CAPSULE ORAL 2 TIMES DAILY
Qty: 42 CAPSULE | Refills: 0 | Status: SHIPPED | OUTPATIENT
Start: 2018-11-04 | End: 2018-11-25

## 2018-11-04 RX ORDER — SODIUM HYPOCHLORITE 1.25 MG/ML
SOLUTION TOPICAL DAILY
Qty: 1 BOTTLE | Refills: 1 | Status: SHIPPED | OUTPATIENT
Start: 2018-11-04 | End: 2019-01-08 | Stop reason: ALTCHOICE

## 2018-11-04 RX ORDER — MINOCYCLINE HYDROCHLORIDE 100 MG/1
100 CAPSULE ORAL 2 TIMES DAILY
Qty: 42 CAPSULE | Refills: 0 | Status: SHIPPED | OUTPATIENT
Start: 2018-11-04 | End: 2018-11-04

## 2018-11-04 RX ORDER — SODIUM HYPOCHLORITE 1.25 MG/ML
SOLUTION TOPICAL DAILY
Qty: 1 BOTTLE | Refills: 1 | Status: SHIPPED | OUTPATIENT
Start: 2018-11-04 | End: 2018-11-04

## 2018-11-04 RX ADMIN — DAKIN'S SOLUTION 0.125% (QUARTER STRENGTH): 0.12 SOLUTION at 11:59

## 2018-11-04 RX ADMIN — INSULIN LISPRO 6 UNITS: 100 INJECTION, SOLUTION INTRAVENOUS; SUBCUTANEOUS at 08:01

## 2018-11-04 RX ADMIN — APIXABAN 5 MG: 5 TABLET, FILM COATED ORAL at 08:20

## 2018-11-04 RX ADMIN — CLOPIDOGREL BISULFATE 75 MG: 75 TABLET ORAL at 08:20

## 2018-11-04 RX ADMIN — AMLODIPINE BESYLATE 10 MG: 5 TABLET ORAL at 08:20

## 2018-11-04 RX ADMIN — IPRATROPIUM BROMIDE AND ALBUTEROL SULFATE 1 AMPULE: .5; 3 SOLUTION RESPIRATORY (INHALATION) at 07:03

## 2018-11-04 RX ADMIN — INSULIN LISPRO 7 UNITS: 100 INJECTION, SOLUTION INTRAVENOUS; SUBCUTANEOUS at 11:52

## 2018-11-04 RX ADMIN — MINOCYCLINE HYDROCHLORIDE 100 MG: 50 CAPSULE ORAL at 11:51

## 2018-11-04 RX ADMIN — MULTIPLE VITAMINS W/ MINERALS TAB 1 TABLET: TAB at 08:20

## 2018-11-04 RX ADMIN — IPRATROPIUM BROMIDE AND ALBUTEROL SULFATE 1 AMPULE: .5; 3 SOLUTION RESPIRATORY (INHALATION) at 10:53

## 2018-11-04 RX ADMIN — MEROPENEM 1 G: 1 INJECTION, POWDER, FOR SOLUTION INTRAVENOUS at 04:03

## 2018-11-04 RX ADMIN — INSULIN LISPRO 9 UNITS: 100 INJECTION, SOLUTION INTRAVENOUS; SUBCUTANEOUS at 11:52

## 2018-11-04 RX ADMIN — INSULIN LISPRO 7 UNITS: 100 INJECTION, SOLUTION INTRAVENOUS; SUBCUTANEOUS at 08:03

## 2018-11-04 ASSESSMENT — PAIN SCALES - GENERAL: PAINLEVEL_OUTOF10: 0

## 2018-11-04 NOTE — PROGRESS NOTES
Vascular Surgery Rounding Note                                                     Dr.Timothy Schaffer    11/4/2018  9:35 AM  Antibiotic: Merrem 1gm q12h                   Vancomycin 1,250mg q24h    Subjective- No complaints, wants to go home    Objective-   Invalid input(s): 24H    Intake/Output Summary (Last 24 hours) at 11/04/18 0935  Last data filed at 11/04/18 0933   Gross per 24 hour   Intake             1040 ml   Output              660 ml   Net              380 ml     In: 450 [P.O.:450]  Out: 100 [Urine:100]    Physical Exam:  Awake, alert, appropriate Yes  BP (!) 151/65   Pulse 64   Temp 97.2 °F (36.2 °C) (Temporal)   Resp 16   Ht 5' 8\" (1.727 m)   Wt 227 lb (103 kg)   SpO2 90%   BMI 34.52 kg/m²   Heart-regular rate and rhythm  Lung-clear bilaterally anterior, diminished lower lobe sounds  Extremities feet warm to touch/pink color bilaterally, mild edema bilateral LE's; Biphasic doppler signals DP area  Other Wounds-left medial arch with debrided wound, is beefy red/clean, some bloody drainage. Erythema gone.      Document Information     Wound   Left medial foot wound photo   11/02/2018 07:40     Gram Stain Result 11/01/2018  9:22 AM  - [facility]   Moderate WBC's (Polymorphonuclear)   Moderate Gram positive cocci  in pairs   Moderate Gram positive cocci  in clusters      Culture & Susceptibility from cultures done 11-1-18    STAPHYLOCOCCUS AUREUS     Antibiotic Interpretation [facility] Unit   azithromycin Sensitive  mcg/mL   benzylpenicillin Sensitive 0.06 mcg/mL   clindamycin Sensitive <=0.25 mcg/mL   doxycycline Sensitive  mcg/mL   erythromycin Sensitive <=0.25 mcg/mL   inducible clindamycin resistance Negative Neg mcg/mL   oxacillin Sensitive <=0.25 mcg/mL   tetracycline Sensitive <=1 mcg/mL   trimethoprim-sulfamethoxazole Sensitive <=10 mcg/mL   vancomycin Sensitive <=0.5 mcg/mL             Assessment/Plan:  Stable POD #3   IV abx-Change to PO tetracycline  OK to d/c if arrangements made  I will follow in the wound clinic  Dressing changes-BID with 1/8th strength Dakins moistened gauze after soap and water wash. Glucose control- hospitalists. Antibiotics continued after surgery due to:   Infection -  [x]  Abscess    []  Pneumonia    []  Aspiration Pneumonia    []  Sepsis    [x]  Cellulitis    []  Positive culture    []  UTI    []  Osteomyelitis    []  Fungal infection    []  H.pylori    DVT prophylaxis: Eliquis BID

## 2018-11-04 NOTE — PROGRESS NOTES
Dressing changed left foot as ordered. Wound bed appears red, beefy, no drainage present. Patient tolerated procedure well.

## 2018-11-05 ENCOUNTER — TELEPHONE (OUTPATIENT)
Dept: INTERNAL MEDICINE | Age: 83
End: 2018-11-05

## 2018-11-05 LAB
ANAEROBIC CULTURE: ABNORMAL
ANAEROBIC CULTURE: ABNORMAL
BLOOD CULTURE, ROUTINE: NORMAL
CULTURE SURGICAL: ABNORMAL
CULTURE, BLOOD 2: NORMAL
GRAM STAIN RESULT: ABNORMAL
GRAM STAIN RESULT: ABNORMAL
ORGANISM: ABNORMAL

## 2018-11-05 RX ORDER — ISOSORBIDE DINITRATE 5 MG/1
TABLET ORAL
Qty: 540 TABLET | Refills: 2 | Status: SHIPPED | OUTPATIENT
Start: 2018-11-05 | End: 2019-08-02 | Stop reason: SDUPTHER

## 2018-11-05 NOTE — TELEPHONE ENCOUNTER
Patient need their pharmacy changed to [facility] by the [facility].  Thank you

## 2018-11-06 ENCOUNTER — TELEPHONE (OUTPATIENT)
Dept: INTERNAL MEDICINE | Age: 83
End: 2018-11-06

## 2018-11-06 DIAGNOSIS — E11.628 DIABETIC FOOT INFECTION (HCC): Primary | ICD-10-CM

## 2018-11-06 DIAGNOSIS — L08.9 DIABETIC FOOT INFECTION (HCC): Primary | ICD-10-CM

## 2018-11-08 ENCOUNTER — OFFICE VISIT (OUTPATIENT)
Dept: INTERNAL MEDICINE | Age: 83
End: 2018-11-08
Payer: MEDICARE

## 2018-11-08 ENCOUNTER — HOSPITAL ENCOUNTER (OUTPATIENT)
Dept: WOUND CARE | Age: 83
Discharge: HOME | End: 2018-11-08
Payer: MEDICARE

## 2018-11-08 VITALS
TEMPERATURE: 98 F | BODY MASS INDEX: 34.4 KG/M2 | DIASTOLIC BLOOD PRESSURE: 72 MMHG | HEIGHT: 68 IN | HEART RATE: 59 BPM | SYSTOLIC BLOOD PRESSURE: 140 MMHG | WEIGHT: 227 LBS | RESPIRATION RATE: 16 BRPM

## 2018-11-08 VITALS — SYSTOLIC BLOOD PRESSURE: 128 MMHG | DIASTOLIC BLOOD PRESSURE: 70 MMHG

## 2018-11-08 DIAGNOSIS — L97.529 DIABETIC ULCER OF LEFT FOOT ASSOCIATED WITH TYPE 2 DIABETES MELLITUS, UNSPECIFIED PART OF FOOT, UNSPECIFIED ULCER STAGE (HCC): Primary | ICD-10-CM

## 2018-11-08 DIAGNOSIS — E11.621 DIABETIC ULCER OF LEFT MIDFOOT ASSOCIATED WITH TYPE 2 DIABETES MELLITUS, WITH FAT LAYER EXPOSED (HCC): ICD-10-CM

## 2018-11-08 DIAGNOSIS — E11.621 DIABETIC ULCER OF LEFT FOOT ASSOCIATED WITH TYPE 2 DIABETES MELLITUS, UNSPECIFIED PART OF FOOT, UNSPECIFIED ULCER STAGE (HCC): Primary | ICD-10-CM

## 2018-11-08 DIAGNOSIS — L97.422 DIABETIC ULCER OF LEFT MIDFOOT ASSOCIATED WITH TYPE 2 DIABETES MELLITUS, WITH FAT LAYER EXPOSED (HCC): ICD-10-CM

## 2018-11-08 PROCEDURE — 1111F DSCHRG MED/CURRENT MED MERGE: CPT | Performed by: INTERNAL MEDICINE

## 2018-11-08 PROCEDURE — 99214 OFFICE O/P EST MOD 30 MIN: CPT | Performed by: NURSE PRACTITIONER

## 2018-11-08 PROCEDURE — 99495 TRANSJ CARE MGMT MOD F2F 14D: CPT | Performed by: INTERNAL MEDICINE

## 2018-11-08 PROCEDURE — 99213 OFFICE O/P EST LOW 20 MIN: CPT

## 2018-11-08 ASSESSMENT — ENCOUNTER SYMPTOMS
SINUS PRESSURE: 0
BLOOD IN STOOL: 0
WHEEZING: 0
ABDOMINAL PAIN: 0
ABDOMINAL DISTENTION: 0
SHORTNESS OF BREATH: 0
BACK PAIN: 0
EYE DISCHARGE: 0
VOMITING: 0
TROUBLE SWALLOWING: 0
EYE ITCHING: 0
SORE THROAT: 0
NAUSEA: 0

## 2018-11-08 ASSESSMENT — PAIN SCALES - GENERAL: PAINLEVEL_OUTOF10: 0

## 2018-11-08 NOTE — PROGRESS NOTES
mellitus, with fat layer exposed ([facility] 11/08/2018    Diabetic foot infection ([facility] 10/31/2018    Chronic fatigue 10/18/2018    History of prostate cancer 08/15/2018    Male stress incontinence 08/15/2018    Sick sinus syndrome ([facility]     Pacemaker     Mobitz type 2 second degree heart block     Chronic diastolic congestive heart failure ([facility] 05/10/2016    Obesity due to excess calories 05/10/2016    Coronary artery disease involving native coronary artery without angina pectoris 05/10/2016    Complete heart block ([facility] 05/10/2016    Glaucoma 05/10/2016    Pure hypercholesterolemia 05/10/2016    Benign prostatic hyperplasia 05/10/2016     Updating Deprecated Diagnoses      CKD (chronic kidney disease) stage 3, GFR 30-59 ml/min ([facility]) 05/10/2016    Anemia in CKD (chronic kidney disease) 05/10/2016     Updating deleted diagnoses      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, with long-term current use of insulin ([facility])      Current Outpatient Prescriptions   Medication Sig Dispense Refill    isosorbide dinitrate (ISORDIL) 5 MG tablet TAKE 3 TABLETS TWICE A  tablet 2    sodium hypochlorite (DAKINS) 0.125 % SOLN external solution Apply topically daily 1 Bottle 1    minocycline (MINOCIN;DYNACIN) 100 MG capsule Take 1 capsule by mouth 2 times daily for 21 days 42 capsule 0    amLODIPine (NORVASC) 5 MG tablet TAKE 1 TABLET DAILY (MUST KEEP FOLLOW UP FOR ADDITIONAL REFILLS) 90 tablet 1    atorvastatin (LIPITOR) 10 MG tablet TAKE 1 TABLET DAILY AT BEDTIME 90 tablet 1    furosemide (LASIX) 40 MG tablet TAKE 1 TABLET DAILY (Patient taking differently: 1/2 tablet) 90 tablet 1    apixaban (ELIQUIS) 5 MG TABS tablet Take 1 tablet by mouth 2 times daily 180 tablet 1    spironolactone (ALDACTONE) 25 MG tablet TAKE 1 TABLET DAILY (Patient taking differently: take 1/2 tablet) 90 tablet 3    ezetimibe (ZETIA) 10 MG tablet TAKE 1 TABLET DAILY 90 tablet 1    carvedilol (COREG) 3.125 MG tablet TAKE 1 TABLET [id number] performed by Zeus Brody MD at [facility] OR     Family History   Problem Relation Age of Onset    Cancer Mother     Other Father      Social History   Substance Use Topics    Smoking status: Former Smoker    Smokeless tobacco: Never Used    Alcohol use No       Review of Systems  Constitutional / general:  No fever / chills / sweats  Head:  No headache / neck stiffness / trauma / visual change  Eyes:  No blurry vision / acute visual change or loss / itching / redness  ENT: No sore throat / hoarseness / nasal drainage / ear pain  CV:  No chest pain / palpitations/ orthopnea   Respiratory:  No cough / shortness of breath / sputum / hemoptysis  GI: No nausea / vomiting / abdominal pain / diarrhea / constipation  :  No dysuria / hesitancy / urgency / hematuria   Neuro: No paralysis / syncope / seizure / dysphagia / headache / paresthesias  Musculoskeletal:  No muscle weakness /joint stiffness / pain  Vascular: No edema / claudication / thrombosis / varicosities  Heme / endocrine: No easy bruising / bleeding / excessive sweating / heat or cold intolerance  Psychiatric:  No depression / anxiety / insomnia / mood changes  Skin:  No new rashes / lesions / skin hair or nail changes  Wound: left medial foot   All other review of systems are negative. Physical Exam    BP (!) 140/72   Pulse 59   Temp 98 °F (36.7 °C) (Temporal)   Resp 16   Ht 5' 8\" (1.727 m)   Wt 227 lb (103 kg)   BMI 34.52 kg/m²     General appearance: alert, appears stated age, cooperative and no distress  Head: Normocephalic, without obvious abnormality, atraumatic  Eyes: conjunctivae/corneas clear. PERRL, EOM's intact.    Ears: normal external ears  Neck: no adenopathy, no carotid bruit,   Lungs: clear to auscultation bilaterally,no rales or wheezes   Heart: regular rate and rhythm  Abdomen:soft, non-tender; non-distended normal bowel sounds no masses, no organomegaly  Extremities: no sign of DVT  Lymphatic: No palpable lymph node enlargment  Neurologic: Alert and oriented X 3  Psychiatric:  Alert and oriented, thought content appropriate, normal insight, mood appropriate  Skin: left medial foot     Post Debridement Measurements and Assessment:    Wound 11/08/18 Blister Foot Left Left Medial Diabetic Foot Ulcer #1 (Active)   Wound Type Wound 11/8/2018  3:14 PM   Wound Diabetic [place] 1 11/8/2018  3:14 PM   Dressing Status Old drainage 11/8/2018  3:14 PM   Dressing Changed Changed/New 11/8/2018  3:14 PM   Dressing/Treatment Moist to moist 11/8/2018  3:14 PM   Wound Cleansed Rinsed/Irrigated with saline 11/8/2018  3:14 PM   Wound Length (cm) 3.5 cm 11/8/2018  3:14 PM   Wound Width (cm) 1.2 cm 11/8/2018  3:14 PM   Wound Depth (cm)  .7 11/8/2018  3:14 PM   Calculated Wound Size (cm^2) (l*w) 4.2 cm^2 11/8/2018  3:14 PM   Tunneling Position ___ O'Clock 0 11/8/2018  3:14 PM   Undermining Starts ___ O'Clock 0 11/8/2018  3:14 PM   Undermining Ends___ O'Clock 0 11/8/2018  3:14 PM   Undermining Maxium Distance (cm) 0 11/8/2018  3:14 PM   Drainage Amount Moderate 11/8/2018  3:14 PM   Drainage Description Serosanguinous 11/8/2018  3:14 PM   Odor None 11/8/2018  3:14 PM   Margins Attached edges 11/8/2018  3:14 PM   Gisele-wound Assessment Intact 11/8/2018  3:14 PM   Non-staged Wound Description Full thickness 11/8/2018  3:14 PM   [place]%Wound Bed 80 11/8/2018  3:14 PM   Yellow%Wound Bed 20 11/8/2018  3:14 PM   Debridement per physician None 11/8/2018  3:14 PM   Time out N/A 11/8/2018  3:14 PM   Number of days: 0       Incision 11/01/18 Foot Left (Active)   Wound Image   11/2/2018 12:03 PM   Wound Assessment Clean;Dry; Intact 11/4/2018  8:20 AM   Gisele-wound Assessment Clean;Dry; Intact 11/4/2018  8:20 AM   Drainage Amount None 11/4/2018  8:20 AM   Drainage Description Sanguinous 11/2/2018  9:50 PM   Odor None 11/4/2018  8:20 AM   Dressing/Treatment Moist to dry;Vaseline gauze 11/4/2018  8:20 AM   Dressing Changed Changed/New 11/4/2018 12:03 PM   Dressing Status

## 2018-11-08 NOTE — PROGRESS NOTES
and wound. Allergic/Immunologic: Negative for environmental allergies and food allergies. Neurological: Negative for dizziness, tremors, syncope, weakness, numbness and headaches. Hematological: Negative for adenopathy. Psychiatric/Behavioral: Negative for agitation and hallucinations. The patient is not nervous/anxious. Physical Exam:  Physical Exam   Constitutional: He is oriented to person, place, and time. He appears well-developed and well-nourished. No distress. HENT:   Head: Normocephalic and atraumatic. Right Ear: External ear normal.   Left Ear: External ear normal.   Nose: Nose normal.   Mouth/Throat: Oropharynx is clear and moist. No oropharyngeal exudate. Eyes: Pupils are equal, round, and reactive to light. Conjunctivae and EOM are normal. Right eye exhibits no discharge. Left eye exhibits no discharge. No scleral icterus. Neck: Normal range of motion. Neck supple. No JVD present. No tracheal deviation present. No thyromegaly present. Cardiovascular: Normal rate, regular rhythm, normal heart sounds and intact distal pulses. Exam reveals no gallop and no friction rub. No murmur heard. Pulmonary/Chest: Effort normal and breath sounds normal. No respiratory distress. He has no wheezes. He has no rales. Abdominal: Soft. Bowel sounds are normal. He exhibits no distension and no mass. There is no tenderness. There is no rebound and no guarding. Musculoskeletal: Normal range of motion. He exhibits no edema, tenderness or deformity. Lymphadenopathy:     He has no cervical adenopathy. Neurological: He is alert and oriented to person, place, and time. He has normal reflexes. He displays normal reflexes. No cranial nerve deficit. He exhibits normal muscle tone. Coordination normal.   Skin: Skin is warm and dry. No rash noted. He is not diaphoretic. There is erythema. No pallor. Psychiatric: He has a normal mood and affect.  His behavior is normal. Judgment and thought content

## 2018-11-14 ENCOUNTER — HOSPITAL ENCOUNTER (OUTPATIENT)
Dept: WOUND CARE | Age: 83
Discharge: HOME | End: 2018-11-14
Payer: MEDICARE

## 2018-11-14 VITALS
HEIGHT: 68 IN | HEART RATE: 61 BPM | TEMPERATURE: 97 F | RESPIRATION RATE: 16 BRPM | BODY MASS INDEX: 34.4 KG/M2 | WEIGHT: 227 LBS | DIASTOLIC BLOOD PRESSURE: 58 MMHG | SYSTOLIC BLOOD PRESSURE: 123 MMHG

## 2018-11-14 DIAGNOSIS — E11.621 DIABETIC ULCER OF LEFT MIDFOOT ASSOCIATED WITH TYPE 2 DIABETES MELLITUS, WITH FAT LAYER EXPOSED (HCC): ICD-10-CM

## 2018-11-14 DIAGNOSIS — L97.422 DIABETIC ULCER OF LEFT MIDFOOT ASSOCIATED WITH TYPE 2 DIABETES MELLITUS, WITH FAT LAYER EXPOSED (HCC): ICD-10-CM

## 2018-11-14 PROCEDURE — 11042 DBRDMT SUBQ TIS 1ST 20SQCM/<: CPT

## 2018-11-14 PROCEDURE — 11042 DBRDMT SUBQ TIS 1ST 20SQCM/<: CPT | Performed by: SURGERY

## 2018-11-14 ASSESSMENT — PAIN SCALES - GENERAL: PAINLEVEL_OUTOF10: 0

## 2018-11-14 NOTE — PROGRESS NOTES
Smokeless tobacco: Never Used    Alcohol use No       ALLERGIES    No Known Allergies    MEDICATIONS    Current Outpatient Prescriptions on File Prior to Encounter   Medication Sig Dispense Refill    isosorbide dinitrate (ISORDIL) 5 MG tablet TAKE 3 TABLETS TWICE A  tablet 2    sodium hypochlorite (DAKINS) 0.125 % SOLN external solution Apply topically daily 1 Bottle 1    minocycline (MINOCIN;DYNACIN) 100 MG capsule Take 1 capsule by mouth 2 times daily for 21 days 42 capsule 0    amLODIPine (NORVASC) 5 MG tablet TAKE 1 TABLET DAILY (MUST KEEP FOLLOW UP FOR ADDITIONAL REFILLS) 90 tablet 1    atorvastatin (LIPITOR) 10 MG tablet TAKE 1 TABLET DAILY AT BEDTIME 90 tablet 1    furosemide (LASIX) 40 MG tablet TAKE 1 TABLET DAILY (Patient taking differently: 1/2 tablet) 90 tablet 1    apixaban (ELIQUIS) 5 MG TABS tablet Take 1 tablet by mouth 2 times daily 180 tablet 1    spironolactone (ALDACTONE) 25 MG tablet TAKE 1 TABLET DAILY (Patient taking differently: take 1/2 tablet) 90 tablet 3    ezetimibe (ZETIA) 10 MG tablet TAKE 1 TABLET DAILY 90 tablet 1    carvedilol (COREG) 3.125 MG tablet TAKE 1 TABLET TWICE A  tablet 1    polyethylene glycol (GLYCOLAX) powder Take 17 g by mouth daily 1 Bottle 5    clopidogrel (PLAVIX) 75 MG tablet TAKE 1 TABLET DAILY 90 tablet 3    timolol (TIMOPTIC) 0.5 % ophthalmic solution       LANTUS 100 UNIT/ML injection vial INJECT 40 UNITS UNDER THE SKIN AT BEDTIME 50 mL 3    Coenzyme Q10 (COQ10 PO) Take 10 mg by mouth nightly      Multiple Vitamins-Minerals (THERAPEUTIC MULTIVITAMIN-MINERALS) tablet Take 1 tablet by mouth daily      meclizine (ANTIVERT) 12.5 MG tablet Take 12.5 mg by mouth 3 times daily as needed      insulin lispro (HUMALOG) 100 UNIT/ML injection vial As directed per sliding scale 6 vial 3    B-D INS SYR ULTRAFINE 1CC/31G 31G X 5/16\" 1 ML MISC       Garlic 10 MG CAPS Take 1 capsule by mouth daily Pt unsure of dose.  States it changes because he PM   Dressing Status Old drainage 11/14/2018  3:21 PM   Dressing Changed Changed/New 11/14/2018  3:31 PM   Dressing/Treatment Moist to dry 11/14/2018  3:31 PM   Wound Cleansed Rinsed/Irrigated with saline 11/14/2018  3:21 PM   Wound Length (cm) 2.5 cm 11/14/2018  3:31 PM   Wound Width (cm) 1 cm 11/14/2018  3:31 PM   Wound Depth (cm)  .5 11/14/2018  3:31 PM   Calculated Wound Size (cm^2) (l*w) 2.5 cm^2 11/14/2018  3:31 PM   Change in Wound Size % (l*w) 40.48 11/14/2018  3:31 PM   Tunneling Position ___ O'Clock 0 11/8/2018  3:14 PM   Undermining Starts ___ O'Clock 0 11/8/2018  3:14 PM   Undermining Ends___ O'Clock 0 11/8/2018  3:14 PM   Undermining Maxium Distance (cm) 0 11/8/2018  3:14 PM   Drainage Amount Moderate 11/14/2018  3:21 PM   Drainage Description Serosanguinous 11/14/2018  3:21 PM   Odor None 11/14/2018  3:21 PM   Margins Attached edges 11/14/2018  3:21 PM   Gisele-wound Assessment Intact 11/14/2018  3:21 PM   Non-staged Wound Description Full thickness 11/14/2018  3:21 PM   [place]%Wound Bed 80 11/14/2018  3:21 PM   Yellow%Wound Bed 20 11/14/2018  3:21 PM   Debridement per physician Subcutaneous 11/14/2018  3:31 PM   Time out Yes 11/14/2018  3:31 PM   Procedural Pain 0 11/14/2018  3:31 PM   Post procedural Pain 0 11/14/2018  3:31 PM   Number of days: 6       Incision 11/01/18 Foot Left (Active)   Wound Image   11/2/2018 12:03 PM   Wound Assessment Clean;Dry; Intact 11/4/2018  8:20 AM   Gisele-wound Assessment Clean;Dry; Intact 11/4/2018  8:20 AM   Drainage Amount None 11/4/2018  8:20 AM   Drainage Description Sanguinous 11/2/2018  9:50 PM   Odor None 11/4/2018  8:20 AM   Dressing/Treatment Moist to dry;Vaseline gauze 11/4/2018  8:20 AM   Dressing Changed Changed/New 11/4/2018 12:03 PM   Dressing Status Clean;Dry; Intact; Changed 11/4/2018 12:03 PM   Dressing Change Due 11/05/18 11/4/2018 12:03 PM   Number of days: 13         Estimated Blood Loss:  Minimal    Hemostasis Achieved:  by pressure    Procedural Pain:  0 / 10     Post Procedural Pain:  0 / 10     Response to treatment:  Well tolerated by patient. Plan:     Treatment Note please see attached Discharge Instructions    In my professional opinion this patient would benefit from HBO Therapy: No    Written patient dismissal instructions given to patient and signed by patient or POA. Discharge Instructions       Visit Discharge/Physician Orders    Discharge condition: Stable    Discharge to: Home    Left via:Private automobile    Accompanied by: Family     ECF/HHA: [facility]      Dressing Orders:  Left Plantar Foot:  Wash with soap and water. Promogran to base of wound  Apply 1/4 STR Dakins moistened nugauze to wound bed. Cover with gauze. Secure with medipore tape. Change twice daily . Keep with off foot when possible, use wheel chair     Treatment Orders:  Finish oral antibiotic as prescribed     [facility] followup visit ______________1 week_______________  (Please note your next appointment above and if you are unable to keep, kindly give a 24 hour notice. Thank you.)    If you experience any of the following, please call the [employer] during business hours:    * Increase in Pain  * Temperature over 101  * Increase in drainage from your wound  * Drainage with a foul odor  * Bleeding  * Increase in swelling  * Need for compression bandage changes due to slippage, breakthrough drainage. If you need medical attention outside of the business hours of the [email or web address] please contact your PCP or go to the nearest emergency room.         Electronically signed by Marco Serrato MD on 11/14/2018 at 3:32 PM

## 2018-11-21 ENCOUNTER — HOSPITAL ENCOUNTER (OUTPATIENT)
Dept: WOUND CARE | Age: 83
Discharge: HOME | End: 2018-11-21
Payer: MEDICARE

## 2018-11-21 VITALS
BODY MASS INDEX: 34.4 KG/M2 | SYSTOLIC BLOOD PRESSURE: 143 MMHG | TEMPERATURE: 97.3 F | DIASTOLIC BLOOD PRESSURE: 61 MMHG | HEIGHT: 68 IN | RESPIRATION RATE: 14 BRPM | WEIGHT: 227 LBS | HEART RATE: 60 BPM

## 2018-11-21 DIAGNOSIS — L97.422 DIABETIC ULCER OF LEFT MIDFOOT ASSOCIATED WITH TYPE 2 DIABETES MELLITUS, WITH FAT LAYER EXPOSED (HCC): ICD-10-CM

## 2018-11-21 DIAGNOSIS — E11.621 DIABETIC ULCER OF LEFT MIDFOOT ASSOCIATED WITH TYPE 2 DIABETES MELLITUS, WITH FAT LAYER EXPOSED (HCC): ICD-10-CM

## 2018-11-21 PROCEDURE — 11042 DBRDMT SUBQ TIS 1ST 20SQCM/<: CPT

## 2018-11-21 PROCEDURE — 11042 DBRDMT SUBQ TIS 1ST 20SQCM/<: CPT | Performed by: SURGERY

## 2018-11-21 ASSESSMENT — PAIN SCALES - GENERAL: PAINLEVEL_OUTOF10: 0

## 2018-11-21 NOTE — PROGRESS NOTES
ALLERGIES    No Known Allergies    MEDICATIONS    Current Outpatient Prescriptions on File Prior to Encounter   Medication Sig Dispense Refill    isosorbide dinitrate (ISORDIL) 5 MG tablet TAKE 3 TABLETS TWICE A  tablet 2    sodium hypochlorite (DAKINS) 0.125 % SOLN external solution Apply topically daily 1 Bottle 1    minocycline (MINOCIN;DYNACIN) 100 MG capsule Take 1 capsule by mouth 2 times daily for 21 days 42 capsule 0    amLODIPine (NORVASC) 5 MG tablet TAKE 1 TABLET DAILY (MUST KEEP FOLLOW UP FOR ADDITIONAL REFILLS) 90 tablet 1    atorvastatin (LIPITOR) 10 MG tablet TAKE 1 TABLET DAILY AT BEDTIME 90 tablet 1    furosemide (LASIX) 40 MG tablet TAKE 1 TABLET DAILY (Patient taking differently: 1/2 tablet) 90 tablet 1    apixaban (ELIQUIS) 5 MG TABS tablet Take 1 tablet by mouth 2 times daily 180 tablet 1    spironolactone (ALDACTONE) 25 MG tablet TAKE 1 TABLET DAILY (Patient taking differently: take 1/2 tablet) 90 tablet 3    ezetimibe (ZETIA) 10 MG tablet TAKE 1 TABLET DAILY 90 tablet 1    carvedilol (COREG) 3.125 MG tablet TAKE 1 TABLET TWICE A  tablet 1    polyethylene glycol (GLYCOLAX) powder Take 17 g by mouth daily 1 Bottle 5    clopidogrel (PLAVIX) 75 MG tablet TAKE 1 TABLET DAILY 90 tablet 3    insulin lispro (HUMALOG) 100 UNIT/ML injection vial As directed per sliding scale 6 vial 3    B-D INS SYR ULTRAFINE 1CC/31G 31G X 5/16\" 1 ML MISC       timolol (TIMOPTIC) 0.5 % ophthalmic solution       LANTUS 100 UNIT/ML injection vial INJECT 40 UNITS UNDER THE SKIN AT BEDTIME 50 mL 3    Coenzyme Q10 (COQ10 PO) Take 10 mg by mouth nightly      Garlic 10 MG CAPS Take 1 capsule by mouth daily Pt unsure of dose. States it changes because he gets whatever is on sale.       Multiple Vitamins-Minerals (THERAPEUTIC MULTIVITAMIN-MINERALS) tablet Take 1 tablet by mouth daily      meclizine (ANTIVERT) 12.5 MG tablet Take 12.5 mg by mouth 3 times daily as needed      nitroGLYCERIN

## 2018-11-27 LAB
ALBUMIN SERPL-MCNC: 3.8 G/DL (ref 3.5–5.2)
ALP BLD-CCNC: 91 U/L (ref 40–130)
ALT SERPL-CCNC: 14 U/L (ref 5–41)
ANION GAP SERPL CALCULATED.3IONS-SCNC: 12 MMOL/L (ref 7–19)
AST SERPL-CCNC: 18 U/L (ref 5–40)
BACTERIA: NEGATIVE /HPF
BASOPHILS ABSOLUTE: 0 K/UL (ref 0–0.2)
BASOPHILS RELATIVE PERCENT: 0.4 % (ref 0–1)
BILIRUB SERPL-MCNC: 0.4 MG/DL (ref 0.2–1.2)
BILIRUBIN URINE: NEGATIVE
BLOOD, URINE: NEGATIVE
BUN BLDV-MCNC: 45 MG/DL (ref 8–23)
CALCIUM SERPL-MCNC: 9.2 MG/DL (ref 8.2–9.6)
CHLORIDE BLD-SCNC: 97 MMOL/L (ref 98–111)
CLARITY: ABNORMAL
CO2: 26 MMOL/L (ref 22–29)
COLOR: YELLOW
CREAT SERPL-MCNC: 1.7 MG/DL (ref 0.5–1.2)
CREATININE URINE: 77.4 MG/DL (ref 4.2–622)
EOSINOPHILS ABSOLUTE: 0.4 K/UL (ref 0–0.6)
EOSINOPHILS RELATIVE PERCENT: 3.7 % (ref 0–5)
EPITHELIAL CELLS, UA: 1 /HPF (ref 0–5)
GFR NON-AFRICAN AMERICAN: 38
GLUCOSE BLD-MCNC: 364 MG/DL (ref 74–109)
GLUCOSE URINE: NEGATIVE MG/DL
HCT VFR BLD CALC: 36.8 % (ref 42–52)
HEMOGLOBIN: 11.9 G/DL (ref 14–18)
HYALINE CASTS: 2 /HPF (ref 0–8)
KETONES, URINE: NEGATIVE MG/DL
LEUKOCYTE ESTERASE, URINE: ABNORMAL
LYMPHOCYTES ABSOLUTE: 3.3 K/UL (ref 1.1–4.5)
LYMPHOCYTES RELATIVE PERCENT: 31.8 % (ref 20–40)
MCH RBC QN AUTO: 30.6 PG (ref 27–31)
MCHC RBC AUTO-ENTMCNC: 32.3 G/DL (ref 33–37)
MCV RBC AUTO: 94.6 FL (ref 80–94)
MONOCYTES ABSOLUTE: 0.8 K/UL (ref 0–0.9)
MONOCYTES RELATIVE PERCENT: 7.7 % (ref 0–10)
NEUTROPHILS ABSOLUTE: 5.8 K/UL (ref 1.5–7.5)
NEUTROPHILS RELATIVE PERCENT: 56.1 % (ref 50–65)
NITRITE, URINE: NEGATIVE
PDW BLD-RTO: 13.3 % (ref 11.5–14.5)
PH UA: 5.5
PLATELET # BLD: 173 K/UL (ref 130–400)
PMV BLD AUTO: 11.8 FL (ref 9.4–12.4)
POTASSIUM SERPL-SCNC: 4.9 MMOL/L (ref 3.5–5)
PROTEIN PROTEIN: 8 MG/DL (ref 15–45)
PROTEIN UA: NEGATIVE MG/DL
RBC # BLD: 3.89 M/UL (ref 4.7–6.1)
RBC UA: 4 /HPF (ref 0–4)
SODIUM BLD-SCNC: 135 MMOL/L (ref 136–145)
SPECIFIC GRAVITY UA: 1.02
TOTAL PROTEIN: 7.8 G/DL (ref 6.6–8.7)
URIC ACID, SERUM: 8.3 MG/DL (ref 3.4–7)
UROBILINOGEN, URINE: 0.2 E.U./DL
WBC # BLD: 10.3 K/UL (ref 4.8–10.8)
WBC UA: 42 /HPF (ref 0–5)

## 2018-11-28 ENCOUNTER — HOSPITAL ENCOUNTER (OUTPATIENT)
Dept: WOUND CARE | Age: 83
Discharge: HOME | End: 2018-11-28
Payer: MEDICARE

## 2018-11-28 VITALS — WEIGHT: 227 LBS | BODY MASS INDEX: 34.4 KG/M2 | HEIGHT: 68 IN

## 2018-11-28 DIAGNOSIS — E11.621 DIABETIC ULCER OF LEFT MIDFOOT ASSOCIATED WITH TYPE 2 DIABETES MELLITUS, WITH FAT LAYER EXPOSED (HCC): ICD-10-CM

## 2018-11-28 DIAGNOSIS — L97.422 DIABETIC ULCER OF LEFT MIDFOOT ASSOCIATED WITH TYPE 2 DIABETES MELLITUS, WITH FAT LAYER EXPOSED (HCC): ICD-10-CM

## 2018-11-28 PROCEDURE — 11042 DBRDMT SUBQ TIS 1ST 20SQCM/<: CPT | Performed by: SURGERY

## 2018-11-28 PROCEDURE — 11042 DBRDMT SUBQ TIS 1ST 20SQCM/<: CPT

## 2018-11-28 ASSESSMENT — PAIN SCALES - GENERAL: PAINLEVEL_OUTOF10: 0

## 2018-11-28 NOTE — PROGRESS NOTES
use No       ALLERGIES    No Known Allergies    MEDICATIONS    Current Outpatient Prescriptions on File Prior to Encounter   Medication Sig Dispense Refill    isosorbide dinitrate (ISORDIL) 5 MG tablet TAKE 3 TABLETS TWICE A  tablet 2    amLODIPine (NORVASC) 5 MG tablet TAKE 1 TABLET DAILY (MUST KEEP FOLLOW UP FOR ADDITIONAL REFILLS) 90 tablet 1    atorvastatin (LIPITOR) 10 MG tablet TAKE 1 TABLET DAILY AT BEDTIME 90 tablet 1    apixaban (ELIQUIS) 5 MG TABS tablet Take 1 tablet by mouth 2 times daily 180 tablet 1    spironolactone (ALDACTONE) 25 MG tablet TAKE 1 TABLET DAILY (Patient taking differently: No sig reported) 90 tablet 3    ezetimibe (ZETIA) 10 MG tablet TAKE 1 TABLET DAILY 90 tablet 1    carvedilol (COREG) 3.125 MG tablet TAKE 1 TABLET TWICE A  tablet 1    polyethylene glycol (GLYCOLAX) powder Take 17 g by mouth daily 1 Bottle 5    clopidogrel (PLAVIX) 75 MG tablet TAKE 1 TABLET DAILY 90 tablet 3    insulin lispro (HUMALOG) 100 UNIT/ML injection vial As directed per sliding scale 6 vial 3    timolol (TIMOPTIC) 0.5 % ophthalmic solution Place 1 drop into both eyes 2 times daily       LANTUS 100 UNIT/ML injection vial INJECT 40 UNITS UNDER THE SKIN AT BEDTIME 50 mL 3    Coenzyme Q10 (COQ10 PO) Take 10 mg by mouth nightly      Garlic 10 MG CAPS Take 1 capsule by mouth daily Pt unsure of dose. States it changes because he gets whatever is on sale.       Multiple Vitamins-Minerals (THERAPEUTIC MULTIVITAMIN-MINERALS) tablet Take 1 tablet by mouth daily      meclizine (ANTIVERT) 12.5 MG tablet Take 12.5 mg by mouth 3 times daily as needed      sodium hypochlorite (DAKINS) 0.125 % SOLN external solution Apply topically daily 1 Bottle 1    furosemide (LASIX) 40 MG tablet TAKE 1 TABLET DAILY (Patient taking differently: No sig reported) 90 tablet 1    B-D INS SYR ULTRAFINE 1CC/31G 31G X 5/16\" 1 ML MISC       nitroGLYCERIN (NITROSTAT) 0.4 MG SL tablet Place 0.4 mg under the tongue

## 2018-12-05 ENCOUNTER — HOSPITAL ENCOUNTER (OUTPATIENT)
Dept: WOUND CARE | Age: 83
Discharge: HOME | End: 2018-12-05
Payer: MEDICARE

## 2018-12-05 VITALS
HEART RATE: 78 BPM | TEMPERATURE: 96.3 F | DIASTOLIC BLOOD PRESSURE: 64 MMHG | BODY MASS INDEX: 34.4 KG/M2 | SYSTOLIC BLOOD PRESSURE: 136 MMHG | HEIGHT: 68 IN | RESPIRATION RATE: 16 BRPM | WEIGHT: 227 LBS

## 2018-12-05 DIAGNOSIS — L97.422 DIABETIC ULCER OF LEFT MIDFOOT ASSOCIATED WITH TYPE 2 DIABETES MELLITUS, WITH FAT LAYER EXPOSED (HCC): ICD-10-CM

## 2018-12-05 DIAGNOSIS — E11.621 DIABETIC ULCER OF LEFT MIDFOOT ASSOCIATED WITH TYPE 2 DIABETES MELLITUS, WITH FAT LAYER EXPOSED (HCC): ICD-10-CM

## 2018-12-05 PROCEDURE — 97597 DBRDMT OPN WND 1ST 20 CM/<: CPT | Performed by: SURGERY

## 2018-12-05 PROCEDURE — 97597 DBRDMT OPN WND 1ST 20 CM/<: CPT

## 2018-12-05 ASSESSMENT — PAIN SCALES - GENERAL: PAINLEVEL_OUTOF10: 0

## 2018-12-05 NOTE — PROGRESS NOTES
[address]   Progress Note and Procedure Note      [address] RECORD NUMBER:  [id number]  AGE: 80 y.o. GENDER: male  : 1925  EPISODE DATE:  2018    Subjective:     Chief Complaint   Patient presents with    Wound Check     foot wound         HISTORY of PRESENT ILLNESS VANNA Howard is a 80 y.o. male who presents today for wound/ulcer evaluation.    History of Wound Context: Pt with L foot wound here for eval/treat  Wound/Ulcer Pain Timing/Severity: none  Quality of pain: N/A  Severity:  0 / 10   Modifying Factors: None  Associated Signs/Symptoms: edema    Ulcer Identification:  Ulcer Type: venous  Contributing Factors: edema and venous stasis    Wound: venous        PAST MEDICAL HISTORY        Diagnosis Date    Arthritis     CAD (coronary artery disease)     Cancer (HCC)     CHF (congestive heart failure) (HCC)     CKD (chronic kidney disease) stage 3, GFR 30-59 ml/min (HCC) 5/10/2016    Colon cancer ([address]     Diabetes mellitus (HCC)     Diastolic heart failure (HCC)     H/O partial resection of colon     Hypertension     Pacemaker     Palliative care patient 2018    Prostate CA ([address]     Pure hypercholesterolemia 5/10/2016    Seizures (HCC)     Sick sinus syndrome ([address]     Skin cancer        PAST SURGICAL HISTORY    Past Surgical History:   Procedure Laterality Date    CARDIAC SURGERY      CHOLECYSTECTOMY      COLECTOMY      COLON SURGERY      NH DEBRIDEMENT, SKIN, SUB-Q TISSUE,=<20 SQ CM Left 2018    INCISION AND DRAINAGE AND EXCISIONAL DEBRIDEMENT OF SKIN AND SUBCUTANEOUS TISSUE OF LEFT FOOT ABSCESS [id number] performed by Yasmany Ortega MD at [facility] OR       FAMILY HISTORY    Family History   Problem Relation Age of Onset    Cancer Mother     Other Father        SOCIAL HISTORY    Social History   Substance Use Topics    Smoking status: Former Smoker    Smokeless tobacco: Never Used    Alcohol use No ALLERGIES    No Known Allergies    MEDICATIONS    Current Outpatient Prescriptions on File Prior to Encounter   Medication Sig Dispense Refill    isosorbide dinitrate (ISORDIL) 5 MG tablet TAKE 3 TABLETS TWICE A  tablet 2    sodium hypochlorite (DAKINS) 0.125 % SOLN external solution Apply topically daily 1 Bottle 1    amLODIPine (NORVASC) 5 MG tablet TAKE 1 TABLET DAILY (MUST KEEP FOLLOW UP FOR ADDITIONAL REFILLS) 90 tablet 1    atorvastatin (LIPITOR) 10 MG tablet TAKE 1 TABLET DAILY AT BEDTIME 90 tablet 1    furosemide (LASIX) 40 MG tablet TAKE 1 TABLET DAILY (Patient taking differently: No sig reported) 90 tablet 1    apixaban (ELIQUIS) 5 MG TABS tablet Take 1 tablet by mouth 2 times daily 180 tablet 1    spironolactone (ALDACTONE) 25 MG tablet TAKE 1 TABLET DAILY (Patient taking differently: No sig reported) 90 tablet 3    ezetimibe (ZETIA) 10 MG tablet TAKE 1 TABLET DAILY 90 tablet 1    carvedilol (COREG) 3.125 MG tablet TAKE 1 TABLET TWICE A  tablet 1    polyethylene glycol (GLYCOLAX) powder Take 17 g by mouth daily 1 Bottle 5    clopidogrel (PLAVIX) 75 MG tablet TAKE 1 TABLET DAILY 90 tablet 3    insulin lispro (HUMALOG) 100 UNIT/ML injection vial As directed per sliding scale 6 vial 3    timolol (TIMOPTIC) 0.5 % ophthalmic solution Place 1 drop into both eyes 2 times daily       LANTUS 100 UNIT/ML injection vial INJECT 40 UNITS UNDER THE SKIN AT BEDTIME 50 mL 3    Coenzyme Q10 (COQ10 PO) Take 10 mg by mouth nightly      Garlic 10 MG CAPS Take 1 capsule by mouth daily Pt unsure of dose. States it changes because he gets whatever is on sale.       Multiple Vitamins-Minerals (THERAPEUTIC MULTIVITAMIN-MINERALS) tablet Take 1 tablet by mouth daily      meclizine (ANTIVERT) 12.5 MG tablet Take 12.5 mg by mouth 3 times daily as needed      nitroGLYCERIN (NITROSTAT) 0.4 MG SL tablet Place 0.4 mg under the tongue every 5 minutes as needed for Chest pain      B-D INS SYR Changed/New 11/28/2018  4:00 PM   Wound Length (cm) 1 cm 12/5/2018  3:29 PM   Wound Width (cm) 0.8 cm 12/5/2018  3:29 PM   Wound Depth (cm) 0.1 cm 12/5/2018  3:29 PM   Wound Surface Area (cm^2) 0.8 cm^2 12/5/2018  3:29 PM   Change in Wound Size % (l*w) 61.9 12/5/2018  3:29 PM   Wound Volume (cm^3) 0.08 cm^3 12/5/2018  3:29 PM   Wound Healing % 87 12/5/2018  3:29 PM   Post-Procedure Length (cm) 1 cm 12/5/2018  3:29 PM   Post-Procedure Width (cm) 0.8 cm 12/5/2018  3:29 PM   Post-Procedure Depth (cm) 0.1 cm 12/5/2018  3:29 PM   Post-Procedure Surface Area (cm^2) 0.8 cm^2 12/5/2018  3:29 PM   Post-Procedure Volume (cm^3) 0.08 cm^3 12/5/2018  3:29 PM   Distance Tunneling (cm) 0 cm 11/28/2018  3:19 PM   Tunneling Position ___ O'Clock 0 11/28/2018  3:19 PM   Undermining Starts ___ O'Clock 0 11/28/2018  3:19 PM   Undermining Ends___ O'Clock 0 11/28/2018  3:19 PM   Undermining Maxium Distance (cm) 0 11/28/2018  3:19 PM   Wound Assessment Drainage 12/5/2018  3:36 PM   Drainage Amount Moderate 12/5/2018  3:36 PM   Drainage Description Serosanguinous 12/5/2018  3:36 PM   Odor None 12/5/2018  3:36 PM   Margins Attached edges 12/5/2018  3:36 PM   Gisele-wound Assessment Dry 12/5/2018  3:36 PM   Non-staged Wound Description Not applicable 96/7/0519  5:14 PM   Hooper%Wound Bed 70 12/5/2018  3:36 PM   Red%Wound Bed 70 11/28/2018  3:19 PM   Yellow%Wound Bed 30 12/5/2018  3:36 PM   Black%Wound Bed 0 11/28/2018  3:19 PM   Purple%Wound Bed 0 11/28/2018  3:19 PM   Other%Wound Bed 0 11/28/2018  3:19 PM   Number of days: 27         Estimated Blood Loss:  Minimal    Hemostasis Achieved:  by pressure    Procedural Pain:  0  / 10     Post Procedural Pain:  0 / 10     Response to treatment:  Well tolerated by patient.          Plan:     Treatment Note please see attached Discharge Instructions    In my professional opinion this patient would benefit from HBO Therapy: No    Written patient dismissal instructions given to patient and signed by

## 2018-12-11 ENCOUNTER — HOSPITAL ENCOUNTER (OUTPATIENT)
Dept: WOUND CARE | Age: 83
Discharge: HOME | End: 2018-12-11
Payer: MEDICARE

## 2018-12-11 VITALS
DIASTOLIC BLOOD PRESSURE: 66 MMHG | RESPIRATION RATE: 18 BRPM | TEMPERATURE: 96.7 F | HEART RATE: 62 BPM | WEIGHT: 227 LBS | HEIGHT: 68 IN | BODY MASS INDEX: 34.4 KG/M2 | SYSTOLIC BLOOD PRESSURE: 137 MMHG

## 2018-12-11 DIAGNOSIS — E11.621 DIABETIC ULCER OF LEFT MIDFOOT ASSOCIATED WITH TYPE 2 DIABETES MELLITUS, WITH FAT LAYER EXPOSED (HCC): ICD-10-CM

## 2018-12-11 DIAGNOSIS — L97.422 DIABETIC ULCER OF LEFT MIDFOOT ASSOCIATED WITH TYPE 2 DIABETES MELLITUS, WITH FAT LAYER EXPOSED (HCC): ICD-10-CM

## 2018-12-11 PROCEDURE — 97597 DBRDMT OPN WND 1ST 20 CM/<: CPT

## 2018-12-11 PROCEDURE — 97597 DBRDMT OPN WND 1ST 20 CM/<: CPT | Performed by: SURGERY

## 2018-12-11 ASSESSMENT — PAIN SCALES - GENERAL: PAINLEVEL_OUTOF10: 0

## 2018-12-17 RX ORDER — INSULIN GLARGINE 100 [IU]/ML
INJECTION, SOLUTION SUBCUTANEOUS
Qty: 50 ML | Refills: 3 | Status: SHIPPED | OUTPATIENT
Start: 2018-12-17 | End: 2019-12-12 | Stop reason: SDUPTHER

## 2018-12-17 NOTE — TELEPHONE ENCOUNTER
Adelaide Redd called requesting a refill of the below medication which has been pended for you:     Requested Prescriptions     Pending Prescriptions Disp Refills    LANTUS 100 UNIT/ML injection vial [Pharmacy Med Name: INSULIN LANTUS VIAL 100U/ML] 50 mL 3     Sig: INJECT 40 UNITS UNDER THE SKIN AT BEDTIME       Last Appointment Date: 11/8/2018  Next Appointment Date: 1/18/2019    No Known Allergies

## 2018-12-19 ENCOUNTER — HOSPITAL ENCOUNTER (OUTPATIENT)
Dept: WOUND CARE | Age: 83
Discharge: HOME | End: 2018-12-19
Payer: MEDICARE

## 2018-12-19 VITALS
BODY MASS INDEX: 34.4 KG/M2 | WEIGHT: 227 LBS | HEIGHT: 68 IN | DIASTOLIC BLOOD PRESSURE: 62 MMHG | SYSTOLIC BLOOD PRESSURE: 120 MMHG | RESPIRATION RATE: 16 BRPM | HEART RATE: 66 BPM | TEMPERATURE: 96.5 F

## 2018-12-19 DIAGNOSIS — E11.621 DIABETIC ULCER OF LEFT MIDFOOT ASSOCIATED WITH TYPE 2 DIABETES MELLITUS, WITH FAT LAYER EXPOSED (HCC): ICD-10-CM

## 2018-12-19 DIAGNOSIS — L97.422 DIABETIC ULCER OF LEFT MIDFOOT ASSOCIATED WITH TYPE 2 DIABETES MELLITUS, WITH FAT LAYER EXPOSED (HCC): Primary | Chronic | ICD-10-CM

## 2018-12-19 DIAGNOSIS — L97.422 DIABETIC ULCER OF LEFT MIDFOOT ASSOCIATED WITH TYPE 2 DIABETES MELLITUS, WITH FAT LAYER EXPOSED (HCC): ICD-10-CM

## 2018-12-19 DIAGNOSIS — E11.621 DIABETIC ULCER OF LEFT MIDFOOT ASSOCIATED WITH TYPE 2 DIABETES MELLITUS, WITH FAT LAYER EXPOSED (HCC): Primary | Chronic | ICD-10-CM

## 2018-12-19 PROCEDURE — 97597 DBRDMT OPN WND 1ST 20 CM/<: CPT | Performed by: SURGERY

## 2018-12-19 PROCEDURE — 97597 DBRDMT OPN WND 1ST 20 CM/<: CPT

## 2018-12-19 ASSESSMENT — PAIN SCALES - GENERAL: PAINLEVEL_OUTOF10: 0

## 2018-12-19 NOTE — PROGRESS NOTES
[address]   Progress Note and Procedure Note      [address] RECORD NUMBER:  [id number]  AGE: 80 y.o. GENDER: male  : 1925  EPISODE DATE:  2018    Subjective:     Chief Complaint   Patient presents with    Wound Check     Lower Ext Wound         HISTORY of PRESENT ILLNESS HPI     Atif Reyes is a 80 y.o. male who presents today for wound/ulcer evaluation.    History of Wound Context: Pt with L foot wound here for eval/treat  Wound/Ulcer Pain Timing/Severity: none  Quality of pain: N/A  Severity:  0 / 10   Modifying Factors: None  Associated Signs/Symptoms: none    Ulcer Identification:  Ulcer Type: diabetic  Contributing Factors: edema, venous stasis, diabetes and shear force    Wound: DM/venous        PAST MEDICAL HISTORY        Diagnosis Date    Arthritis     CAD (coronary artery disease)     Cancer (HCC)     CHF (congestive heart failure) (HCC)     CKD (chronic kidney disease) stage 3, GFR 30-59 ml/min (HCC) 5/10/2016    Colon cancer ([address]     Diabetes mellitus (HCC)     Diastolic heart failure (HCC)     H/O partial resection of colon     Hypertension     Pacemaker     Palliative care patient 2018    Prostate CA ([address]     Pure hypercholesterolemia 5/10/2016    Seizures ([address]     Sick sinus syndrome ([address]     Skin cancer        PAST SURGICAL HISTORY    Past Surgical History:   Procedure Laterality Date    CARDIAC SURGERY      CHOLECYSTECTOMY      COLECTOMY      COLON SURGERY      [place] DEBRIDEMENT, SKIN, SUB-Q TISSUE,=<20 SQ CM Left 2018    INCISION AND DRAINAGE AND EXCISIONAL DEBRIDEMENT OF SKIN AND SUBCUTANEOUS TISSUE OF LEFT FOOT ABSCESS [id number] performed by Weston Diaz MD at [facility] OR       FAMILY HISTORY    Family History   Problem Relation Age of Onset    Cancer Mother     Other Father        SOCIAL HISTORY    Social History   Substance Use Topics    Smoking status: Former Smoker    Smokeless tobacco: Never Used B-D INS SYR ULTRAFINE 1CC/31G 31G X 5/16\" 1 ML MISC        No current facility-administered medications on file prior to encounter. REVIEW OF SYSTEMS    Pertinent items are noted in HPI.     Objective:      /62   Pulse 66   Temp 96.5 °F (35.8 °C) (Temporal)   Resp 16   Ht 5' 8\" (1.727 m)   Wt 227 lb (103 kg)   BMI 34.52 kg/m²     Wt Readings from Last 3 Encounters:   12/19/18 227 lb (103 kg)   12/11/18 227 lb (103 kg)   12/05/18 227 lb (103 kg)       PHYSICAL EXAM    General Appearance: alert and oriented to person, place and time, well developed and well- nourished, in no acute distress  Skin: warm and dry, no rash or erythema  Head: normocephalic and atraumatic  Eyes: pupils equal, round, and reactive to light, extraocular eye movements intact, conjunctivae normal  ENT: tympanic membrane, external ear and ear canal normal bilaterally, nose without deformity, nasal mucosa and turbinates normal without polyps  Neck: supple and non-tender without mass, no thyromegaly or thyroid nodules, no cervical lymphadenopathy  Pulmonary/Chest: clear to auscultation bilaterally- no wheezes, rales or rhonchi, normal air movement, no respiratory distress  Cardiovascular: normal rate, regular rhythm, normal S1 and S2, no murmurs, rubs, clicks, or gallops, distal pulses intact, no carotid bruits  Abdomen: soft, non-tender, non-distended, normal bowel sounds, no masses or organomegaly  Extremities: no cyanosis, clubbing or edema  Musculoskeletal: normal range of motion, no joint swelling, deformity or tenderness  Neurologic: reflexes normal and symmetric, no cranial nerve deficit, gait, coordination and speech normal      Assessment:      Problem List Items Addressed This Visit     * (Principal)Diabetic ulcer of left midfoot associated with type 2 diabetes mellitus, with fat layer exposed ([address] - Primary (Chronic)           Procedure Note  Indications:  Based on my examination of this patient's wound(s)/ulcer(s) today, debridement is required to promote healing and evaluate the wound base. Performed by: Yamileth Monroy MD    Consent obtained:  Yes    Time out taken:  Yes    Pain Control: Anesthetic  Anesthetic: None       Debridement:Non-excisional Debridement    Using curette the wound(s)/ulcer(s) was/were sharply debrided down through and including the removal of epidermis and dermis.         Devitalized Tissue Debrided:  fibrin, biofilm, slough and exudate      Pre Debridement Measurements:  Are located in the [place]  Documentation Flow Sheet    Wound/Ulcer #: 1    Percent of Wound(s)/Ulcer(s) Debrided: 100%    Total Surface Area Debrided:  0.28 sq cm       Diabetic/Pressure/Non Pressure Ulcers only:  Ulcer: Diabetic ulcer, fat layer exposed         Post Debridement Measurements:    Wound/Ulcer Descriptions are Pre Debridement --EXCEPT MEASUREMENTS    Incision 11/01/18 Foot Left (Active)   Number of days: 48       Wound 11/08/18 Left Medial Diabetic Foot Ulcer #1 (Active)   Wound Image    11/28/2018  3:19 PM   Wound Diabetic 12/19/2018  1:06 PM   Dressing Status Old drainage 12/19/2018  1:06 PM   Dressing Changed Changed/New 12/19/2018  1:14 PM   Dressing/Treatment Xeroform 12/19/2018  1:14 PM   Wound Length (cm) 0.7 cm 12/19/2018  1:06 PM   Wound Width (cm) 0.4 cm 12/19/2018  1:06 PM   Wound Depth (cm) 0.1 cm 12/19/2018  1:06 PM   Wound Surface Area (cm^2) 0.28 cm^2 12/19/2018  1:06 PM   Change in Wound Size % (l*w) 86.67 12/19/2018  1:06 PM   Wound Volume (cm^3) 0.03 cm^3 12/19/2018  1:06 PM   Wound Healing % 95 12/19/2018  1:06 PM   Post-Procedure Length (cm) 0.7 cm 12/19/2018  1:14 PM   Post-Procedure Width (cm) 0.4 cm 12/19/2018  1:14 PM   Post-Procedure Depth (cm) 0.1 cm 12/19/2018  1:14 PM   Post-Procedure Surface Area (cm^2) 0.28 cm^2 12/19/2018  1:14 PM   Post-Procedure Volume (cm^3) 0.03 cm^3 12/19/2018  1:14 PM   Distance Tunneling (cm) 0 cm 11/28/2018  3:19 PM   Tunneling Position ___ O'Clock 0

## 2019-01-08 ENCOUNTER — HOSPITAL ENCOUNTER (OUTPATIENT)
Dept: WOUND CARE | Age: 84
Discharge: HOME | End: 2019-01-08
Payer: MEDICARE

## 2019-01-08 VITALS
BODY MASS INDEX: 34.4 KG/M2 | DIASTOLIC BLOOD PRESSURE: 60 MMHG | SYSTOLIC BLOOD PRESSURE: 123 MMHG | RESPIRATION RATE: 16 BRPM | HEIGHT: 68 IN | HEART RATE: 86 BPM | TEMPERATURE: 97 F | WEIGHT: 227 LBS

## 2019-01-08 DIAGNOSIS — I50.32 CHRONIC DIASTOLIC CONGESTIVE HEART FAILURE (HCC): ICD-10-CM

## 2019-01-08 DIAGNOSIS — L97.422 DIABETIC ULCER OF LEFT MIDFOOT ASSOCIATED WITH TYPE 2 DIABETES MELLITUS, WITH FAT LAYER EXPOSED (HCC): ICD-10-CM

## 2019-01-08 DIAGNOSIS — E78.00 HYPERCHOLESTEREMIA: ICD-10-CM

## 2019-01-08 DIAGNOSIS — E11.621 DIABETIC ULCER OF LEFT MIDFOOT ASSOCIATED WITH TYPE 2 DIABETES MELLITUS, WITH FAT LAYER EXPOSED (HCC): ICD-10-CM

## 2019-01-08 DIAGNOSIS — E11.8 TYPE 2 DIABETES MELLITUS WITH COMPLICATION, WITH LONG-TERM CURRENT USE OF INSULIN (HCC): Chronic | ICD-10-CM

## 2019-01-08 DIAGNOSIS — Z79.4 TYPE 2 DIABETES MELLITUS WITH COMPLICATION, WITH LONG-TERM CURRENT USE OF INSULIN (HCC): Chronic | ICD-10-CM

## 2019-01-08 LAB
ALBUMIN SERPL-MCNC: 3.8 G/DL (ref 3.5–5.2)
ALP BLD-CCNC: 81 U/L (ref 40–130)
ALT SERPL-CCNC: 11 U/L (ref 5–41)
ANION GAP SERPL CALCULATED.3IONS-SCNC: 17 MMOL/L (ref 7–19)
AST SERPL-CCNC: 15 U/L (ref 5–40)
BILIRUB SERPL-MCNC: 0.3 MG/DL (ref 0.2–1.2)
BUN BLDV-MCNC: 45 MG/DL (ref 8–23)
CALCIUM SERPL-MCNC: 9.6 MG/DL (ref 8.2–9.6)
CHLORIDE BLD-SCNC: 100 MMOL/L (ref 98–111)
CHOLESTEROL, TOTAL: 133 MG/DL (ref 160–199)
CO2: 22 MMOL/L (ref 22–29)
CREAT SERPL-MCNC: 1.7 MG/DL (ref 0.5–1.2)
GFR NON-AFRICAN AMERICAN: 38
GLUCOSE BLD-MCNC: 268 MG/DL (ref 74–109)
HBA1C MFR BLD: 10.7 % (ref 4–6)
HDLC SERPL-MCNC: 31 MG/DL (ref 55–121)
LDL CHOLESTEROL CALCULATED: 61 MG/DL
POTASSIUM SERPL-SCNC: 5 MMOL/L (ref 3.5–5)
SODIUM BLD-SCNC: 139 MMOL/L (ref 136–145)
TOTAL PROTEIN: 8 G/DL (ref 6.6–8.7)
TRIGL SERPL-MCNC: 204 MG/DL (ref 0–149)

## 2019-01-08 PROCEDURE — 97597 DBRDMT OPN WND 1ST 20 CM/<: CPT

## 2019-01-08 PROCEDURE — 97597 DBRDMT OPN WND 1ST 20 CM/<: CPT | Performed by: SURGERY

## 2019-01-08 ASSESSMENT — PAIN SCALES - GENERAL: PAINLEVEL_OUTOF10: 0

## 2019-01-09 ENCOUNTER — TELEPHONE (OUTPATIENT)
Dept: INTERNAL MEDICINE | Age: 84
End: 2019-01-09

## 2019-01-10 ENCOUNTER — OFFICE VISIT (OUTPATIENT)
Dept: INTERNAL MEDICINE | Age: 84
End: 2019-01-10
Payer: MEDICARE

## 2019-01-10 ENCOUNTER — TELEPHONE (OUTPATIENT)
Dept: INTERNAL MEDICINE CLINIC | Age: 84
End: 2019-01-10

## 2019-01-10 ENCOUNTER — HOSPITAL ENCOUNTER (OUTPATIENT)
Dept: GENERAL RADIOLOGY | Age: 84
Discharge: HOME | End: 2019-01-10
Payer: MEDICARE

## 2019-01-10 VITALS
OXYGEN SATURATION: 92 % | SYSTOLIC BLOOD PRESSURE: 128 MMHG | DIASTOLIC BLOOD PRESSURE: 68 MMHG | TEMPERATURE: 100 F | HEART RATE: 89 BPM

## 2019-01-10 DIAGNOSIS — J40 BRONCHITIS: Primary | ICD-10-CM

## 2019-01-10 DIAGNOSIS — J40 BRONCHITIS: ICD-10-CM

## 2019-01-10 PROCEDURE — 4040F PNEUMOC VAC/ADMIN/RCVD: CPT | Performed by: INTERNAL MEDICINE

## 2019-01-10 PROCEDURE — 1101F PT FALLS ASSESS-DOCD LE1/YR: CPT | Performed by: INTERNAL MEDICINE

## 2019-01-10 PROCEDURE — 1036F TOBACCO NON-USER: CPT | Performed by: INTERNAL MEDICINE

## 2019-01-10 PROCEDURE — G8417 CALC BMI ABV UP PARAM F/U: HCPCS | Performed by: INTERNAL MEDICINE

## 2019-01-10 PROCEDURE — G8598 ASA/ANTIPLAT THER USED: HCPCS | Performed by: INTERNAL MEDICINE

## 2019-01-10 PROCEDURE — 71045 X-RAY EXAM CHEST 1 VIEW: CPT

## 2019-01-10 PROCEDURE — 99213 OFFICE O/P EST LOW 20 MIN: CPT | Performed by: INTERNAL MEDICINE

## 2019-01-10 PROCEDURE — 1123F ACP DISCUSS/DSCN MKR DOCD: CPT | Performed by: INTERNAL MEDICINE

## 2019-01-10 PROCEDURE — G8427 DOCREV CUR MEDS BY ELIG CLIN: HCPCS | Performed by: INTERNAL MEDICINE

## 2019-01-10 PROCEDURE — G8484 FLU IMMUNIZE NO ADMIN: HCPCS | Performed by: INTERNAL MEDICINE

## 2019-01-10 RX ORDER — ALBUTEROL SULFATE 90 UG/1
2 AEROSOL, METERED RESPIRATORY (INHALATION) EVERY 6 HOURS PRN
Qty: 1 INHALER | Refills: 3 | Status: SHIPPED | OUTPATIENT
Start: 2019-01-10 | End: 2020-01-28

## 2019-01-10 RX ORDER — BENZONATATE 200 MG/1
200 CAPSULE ORAL 3 TIMES DAILY PRN
Qty: 30 CAPSULE | Refills: 0 | Status: ON HOLD | OUTPATIENT
Start: 2019-01-10 | End: 2019-01-21

## 2019-01-10 RX ORDER — CEFDINIR 300 MG/1
300 CAPSULE ORAL 2 TIMES DAILY
Qty: 14 CAPSULE | Refills: 0 | Status: ON HOLD | OUTPATIENT
Start: 2019-01-10 | End: 2019-01-21 | Stop reason: HOSPADM

## 2019-01-10 ASSESSMENT — ENCOUNTER SYMPTOMS
BACK PAIN: 0
EYE ITCHING: 0
VOMITING: 0
NAUSEA: 0
SORE THROAT: 0
SHORTNESS OF BREATH: 1
COUGH: 1
ABDOMINAL PAIN: 0
ABDOMINAL DISTENTION: 0
TROUBLE SWALLOWING: 0
SINUS PRESSURE: 0
EYE DISCHARGE: 0
BLOOD IN STOOL: 0
WHEEZING: 1

## 2019-01-14 ENCOUNTER — TELEPHONE (OUTPATIENT)
Dept: INTERNAL MEDICINE | Age: 84
End: 2019-01-14

## 2019-01-14 ENCOUNTER — APPOINTMENT (OUTPATIENT)
Dept: GENERAL RADIOLOGY | Age: 84
DRG: 193 | End: 2019-01-14
Payer: MEDICARE

## 2019-01-14 ENCOUNTER — HOSPITAL ENCOUNTER (INPATIENT)
Age: 84
LOS: 6 days | Discharge: NURSING, REHAB OR LONG-TERM CARE | DRG: 193 | End: 2019-01-21
Attending: EMERGENCY MEDICINE | Admitting: FAMILY MEDICINE
Payer: MEDICARE

## 2019-01-14 DIAGNOSIS — R73.9 HYPERGLYCEMIA: ICD-10-CM

## 2019-01-14 DIAGNOSIS — J18.9 PNEUMONIA DUE TO ORGANISM: Primary | ICD-10-CM

## 2019-01-14 LAB
ALBUMIN SERPL-MCNC: 3.4 G/DL (ref 3.5–5.2)
ALP BLD-CCNC: 117 U/L (ref 40–130)
ALT SERPL-CCNC: 36 U/L (ref 5–41)
ANION GAP SERPL CALCULATED.3IONS-SCNC: 13 MMOL/L (ref 7–19)
AST SERPL-CCNC: 27 U/L (ref 5–40)
BASOPHILS ABSOLUTE: 0 K/UL (ref 0–0.2)
BASOPHILS RELATIVE PERCENT: 0.2 % (ref 0–1)
BILIRUB SERPL-MCNC: 0.4 MG/DL (ref 0.2–1.2)
BUN BLDV-MCNC: 61 MG/DL (ref 8–23)
CALCIUM SERPL-MCNC: 9 MG/DL (ref 8.2–9.6)
CHLORIDE BLD-SCNC: 101 MMOL/L (ref 98–111)
CO2: 24 MMOL/L (ref 22–29)
CREAT SERPL-MCNC: 1.8 MG/DL (ref 0.5–1.2)
EOSINOPHILS ABSOLUTE: 0.2 K/UL (ref 0–0.6)
EOSINOPHILS RELATIVE PERCENT: 0.9 % (ref 0–5)
GFR NON-AFRICAN AMERICAN: 35
GLUCOSE BLD-MCNC: 307 MG/DL (ref 70–99)
GLUCOSE BLD-MCNC: 563 MG/DL (ref 74–109)
HBA1C MFR BLD: 11.5 % (ref 4–6)
HCT VFR BLD CALC: 33.6 % (ref 42–52)
HEMOGLOBIN: 10.8 G/DL (ref 14–18)
LACTIC ACID: 1.8 MMOL/L (ref 0.5–1.9)
LYMPHOCYTES ABSOLUTE: 2.8 K/UL (ref 1.1–4.5)
LYMPHOCYTES RELATIVE PERCENT: 16.6 % (ref 20–40)
MCH RBC QN AUTO: 30.7 PG (ref 27–31)
MCHC RBC AUTO-ENTMCNC: 32.1 G/DL (ref 33–37)
MCV RBC AUTO: 95.5 FL (ref 80–94)
MONOCYTES ABSOLUTE: 0.9 K/UL (ref 0–0.9)
MONOCYTES RELATIVE PERCENT: 5.2 % (ref 0–10)
NEUTROPHILS ABSOLUTE: 13 K/UL (ref 1.5–7.5)
NEUTROPHILS RELATIVE PERCENT: 75.9 % (ref 50–65)
PDW BLD-RTO: 14.4 % (ref 11.5–14.5)
PERFORMED ON: ABNORMAL
PLATELET # BLD: 305 K/UL (ref 130–400)
PMV BLD AUTO: 11 FL (ref 9.4–12.4)
POTASSIUM SERPL-SCNC: 4.6 MMOL/L (ref 3.5–5)
PRO-BNP: 1482 PG/ML (ref 0–1800)
RAPID INFLUENZA  B AGN: NEGATIVE
RAPID INFLUENZA A AGN: NEGATIVE
RBC # BLD: 3.52 M/UL (ref 4.7–6.1)
SODIUM BLD-SCNC: 138 MMOL/L (ref 136–145)
TOTAL PROTEIN: 8.3 G/DL (ref 6.6–8.7)
TROPONIN: <0.01 NG/ML (ref 0–0.03)
WBC # BLD: 17.1 K/UL (ref 4.8–10.8)

## 2019-01-14 PROCEDURE — 80053 COMPREHEN METABOLIC PANEL: CPT

## 2019-01-14 PROCEDURE — 93005 ELECTROCARDIOGRAM TRACING: CPT

## 2019-01-14 PROCEDURE — 94664 DEMO&/EVAL PT USE INHALER: CPT

## 2019-01-14 PROCEDURE — G0378 HOSPITAL OBSERVATION PER HR: HCPCS

## 2019-01-14 PROCEDURE — 6360000002 HC RX W HCPCS: Performed by: EMERGENCY MEDICINE

## 2019-01-14 PROCEDURE — 99285 EMERGENCY DEPT VISIT HI MDM: CPT | Performed by: EMERGENCY MEDICINE

## 2019-01-14 PROCEDURE — 71045 X-RAY EXAM CHEST 1 VIEW: CPT

## 2019-01-14 PROCEDURE — 6370000000 HC RX 637 (ALT 250 FOR IP): Performed by: INTERNAL MEDICINE

## 2019-01-14 PROCEDURE — 36415 COLL VENOUS BLD VENIPUNCTURE: CPT

## 2019-01-14 PROCEDURE — 96374 THER/PROPH/DIAG INJ IV PUSH: CPT

## 2019-01-14 PROCEDURE — 99222 1ST HOSP IP/OBS MODERATE 55: CPT | Performed by: INTERNAL MEDICINE

## 2019-01-14 PROCEDURE — 83880 ASSAY OF NATRIURETIC PEPTIDE: CPT

## 2019-01-14 PROCEDURE — 87804 INFLUENZA ASSAY W/OPTIC: CPT

## 2019-01-14 PROCEDURE — 96375 TX/PRO/DX INJ NEW DRUG ADDON: CPT

## 2019-01-14 PROCEDURE — 82948 REAGENT STRIP/BLOOD GLUCOSE: CPT

## 2019-01-14 PROCEDURE — 94640 AIRWAY INHALATION TREATMENT: CPT

## 2019-01-14 PROCEDURE — 83036 HEMOGLOBIN GLYCOSYLATED A1C: CPT

## 2019-01-14 PROCEDURE — 99285 EMERGENCY DEPT VISIT HI MDM: CPT

## 2019-01-14 PROCEDURE — 85025 COMPLETE CBC W/AUTO DIFF WBC: CPT

## 2019-01-14 PROCEDURE — 6370000000 HC RX 637 (ALT 250 FOR IP): Performed by: EMERGENCY MEDICINE

## 2019-01-14 PROCEDURE — 2580000003 HC RX 258: Performed by: EMERGENCY MEDICINE

## 2019-01-14 PROCEDURE — 84484 ASSAY OF TROPONIN QUANT: CPT

## 2019-01-14 PROCEDURE — 83605 ASSAY OF LACTIC ACID: CPT

## 2019-01-14 PROCEDURE — 87040 BLOOD CULTURE FOR BACTERIA: CPT

## 2019-01-14 RX ORDER — ACETAMINOPHEN 325 MG/1
650 TABLET ORAL EVERY 6 HOURS PRN
Status: DISCONTINUED | OUTPATIENT
Start: 2019-01-14 | End: 2019-01-21 | Stop reason: HOSPADM

## 2019-01-14 RX ORDER — SODIUM CHLORIDE 0.9 % (FLUSH) 0.9 %
10 SYRINGE (ML) INJECTION PRN
Status: DISCONTINUED | OUTPATIENT
Start: 2019-01-14 | End: 2019-01-21 | Stop reason: HOSPADM

## 2019-01-14 RX ORDER — INSULIN GLARGINE 100 [IU]/ML
40 INJECTION, SOLUTION SUBCUTANEOUS NIGHTLY
Status: DISCONTINUED | OUTPATIENT
Start: 2019-01-14 | End: 2019-01-21 | Stop reason: HOSPADM

## 2019-01-14 RX ORDER — 0.9 % SODIUM CHLORIDE 0.9 %
1000 INTRAVENOUS SOLUTION INTRAVENOUS ONCE
Status: COMPLETED | OUTPATIENT
Start: 2019-01-14 | End: 2019-01-14

## 2019-01-14 RX ORDER — SPIRONOLACTONE 50 MG/1
25 TABLET, FILM COATED ORAL DAILY
Status: DISCONTINUED | OUTPATIENT
Start: 2019-01-15 | End: 2019-01-21 | Stop reason: HOSPADM

## 2019-01-14 RX ORDER — CARVEDILOL 3.12 MG/1
3.12 TABLET ORAL 2 TIMES DAILY
Status: DISCONTINUED | OUTPATIENT
Start: 2019-01-14 | End: 2019-01-21 | Stop reason: HOSPADM

## 2019-01-14 RX ORDER — AMLODIPINE BESYLATE 10 MG/1
10 TABLET ORAL DAILY
Status: DISCONTINUED | OUTPATIENT
Start: 2019-01-15 | End: 2019-01-21 | Stop reason: HOSPADM

## 2019-01-14 RX ORDER — ONDANSETRON 2 MG/ML
4 INJECTION INTRAMUSCULAR; INTRAVENOUS EVERY 6 HOURS PRN
Status: DISCONTINUED | OUTPATIENT
Start: 2019-01-14 | End: 2019-01-21 | Stop reason: HOSPADM

## 2019-01-14 RX ORDER — NICOTINE POLACRILEX 4 MG
15 LOZENGE BUCCAL PRN
Status: DISCONTINUED | OUTPATIENT
Start: 2019-01-14 | End: 2019-01-21 | Stop reason: HOSPADM

## 2019-01-14 RX ORDER — MECLIZINE HCL 12.5 MG/1
12.5 TABLET ORAL 2 TIMES DAILY
Status: DISCONTINUED | OUTPATIENT
Start: 2019-01-14 | End: 2019-01-21 | Stop reason: HOSPADM

## 2019-01-14 RX ORDER — DEXTROSE MONOHYDRATE 50 MG/ML
100 INJECTION, SOLUTION INTRAVENOUS PRN
Status: DISCONTINUED | OUTPATIENT
Start: 2019-01-14 | End: 2019-01-21 | Stop reason: HOSPADM

## 2019-01-14 RX ORDER — SODIUM CHLORIDE 0.9 % (FLUSH) 0.9 %
10 SYRINGE (ML) INJECTION EVERY 12 HOURS SCHEDULED
Status: DISCONTINUED | OUTPATIENT
Start: 2019-01-14 | End: 2019-01-21 | Stop reason: HOSPADM

## 2019-01-14 RX ORDER — ISOSORBIDE DINITRATE 10 MG/1
5 TABLET ORAL 2 TIMES DAILY
Status: DISCONTINUED | OUTPATIENT
Start: 2019-01-14 | End: 2019-01-21 | Stop reason: HOSPADM

## 2019-01-14 RX ORDER — EZETIMIBE 10 MG/1
1 TABLET ORAL DAILY
Status: DISCONTINUED | OUTPATIENT
Start: 2019-01-15 | End: 2019-01-14 | Stop reason: CLARIF

## 2019-01-14 RX ORDER — ATORVASTATIN CALCIUM 10 MG/1
10 TABLET, FILM COATED ORAL NIGHTLY
Status: DISCONTINUED | OUTPATIENT
Start: 2019-01-14 | End: 2019-01-21 | Stop reason: HOSPADM

## 2019-01-14 RX ORDER — TIMOLOL MALEATE 5 MG/ML
1 SOLUTION/ DROPS OPHTHALMIC 2 TIMES DAILY
Status: DISCONTINUED | OUTPATIENT
Start: 2019-01-14 | End: 2019-01-21 | Stop reason: HOSPADM

## 2019-01-14 RX ORDER — CLOPIDOGREL BISULFATE 75 MG/1
75 TABLET ORAL DAILY
Status: DISCONTINUED | OUTPATIENT
Start: 2019-01-15 | End: 2019-01-21 | Stop reason: HOSPADM

## 2019-01-14 RX ORDER — IPRATROPIUM BROMIDE AND ALBUTEROL SULFATE 2.5; .5 MG/3ML; MG/3ML
1 SOLUTION RESPIRATORY (INHALATION)
Status: DISCONTINUED | OUTPATIENT
Start: 2019-01-14 | End: 2019-01-21 | Stop reason: HOSPADM

## 2019-01-14 RX ORDER — FUROSEMIDE 40 MG/1
40 TABLET ORAL DAILY
Status: DISCONTINUED | OUTPATIENT
Start: 2019-01-15 | End: 2019-01-21 | Stop reason: HOSPADM

## 2019-01-14 RX ORDER — DEXTROSE MONOHYDRATE 25 G/50ML
12.5 INJECTION, SOLUTION INTRAVENOUS PRN
Status: DISCONTINUED | OUTPATIENT
Start: 2019-01-14 | End: 2019-01-21 | Stop reason: HOSPADM

## 2019-01-14 RX ADMIN — INSULIN GLARGINE 40 UNITS: 100 INJECTION, SOLUTION SUBCUTANEOUS at 23:18

## 2019-01-14 RX ADMIN — CEFTRIAXONE 1 G: 1 INJECTION, POWDER, FOR SOLUTION INTRAMUSCULAR; INTRAVENOUS at 15:32

## 2019-01-14 RX ADMIN — APIXABAN 5 MG: 5 TABLET, FILM COATED ORAL at 23:30

## 2019-01-14 RX ADMIN — ISOSORBIDE DINITRATE 5 MG: 10 TABLET ORAL at 23:18

## 2019-01-14 RX ADMIN — INSULIN LISPRO 3 UNITS: 100 INJECTION, SOLUTION INTRAVENOUS; SUBCUTANEOUS at 23:19

## 2019-01-14 RX ADMIN — MECLIZINE 12.5 MG: 12.5 TABLET ORAL at 23:18

## 2019-01-14 RX ADMIN — INSULIN HUMAN 10 UNITS: 100 INJECTION, SOLUTION PARENTERAL at 14:55

## 2019-01-14 RX ADMIN — ATORVASTATIN CALCIUM 10 MG: 10 TABLET, FILM COATED ORAL at 23:18

## 2019-01-14 RX ADMIN — Medication 500 MG: at 15:32

## 2019-01-14 RX ADMIN — CARVEDILOL 3.12 MG: 3.12 TABLET, FILM COATED ORAL at 23:30

## 2019-01-14 RX ADMIN — SODIUM CHLORIDE 1000 ML: 9 INJECTION, SOLUTION INTRAVENOUS at 15:32

## 2019-01-14 RX ADMIN — TIMOLOL MALEATE 1 DROP: 5 SOLUTION OPHTHALMIC at 23:19

## 2019-01-14 RX ADMIN — IPRATROPIUM BROMIDE AND ALBUTEROL SULFATE 1 AMPULE: .5; 3 SOLUTION RESPIRATORY (INHALATION) at 14:32

## 2019-01-14 ASSESSMENT — ENCOUNTER SYMPTOMS
ABDOMINAL PAIN: 0
VOMITING: 0
NAUSEA: 0
COUGH: 1
DIARRHEA: 0
SORE THROAT: 0
RHINORRHEA: 0
BACK PAIN: 0
SHORTNESS OF BREATH: 1

## 2019-01-15 PROBLEM — G93.41 ENCEPHALOPATHY, METABOLIC: Status: ACTIVE | Noted: 2019-01-15

## 2019-01-15 LAB
BASOPHILS ABSOLUTE: 0.1 K/UL (ref 0–0.2)
BASOPHILS RELATIVE PERCENT: 0.3 % (ref 0–1)
EKG P AXIS: NORMAL DEGREES
EKG P-R INTERVAL: NORMAL MS
EKG Q-T INTERVAL: 408 MS
EKG QRS DURATION: 150 MS
EKG QTC CALCULATION (BAZETT): 453 MS
EKG T AXIS: 100 DEGREES
EOSINOPHILS ABSOLUTE: 0.3 K/UL (ref 0–0.6)
EOSINOPHILS RELATIVE PERCENT: 1.5 % (ref 0–5)
GLUCOSE BLD-MCNC: 220 MG/DL (ref 70–99)
GLUCOSE BLD-MCNC: 292 MG/DL (ref 70–99)
GLUCOSE BLD-MCNC: 295 MG/DL (ref 70–99)
GLUCOSE BLD-MCNC: 302 MG/DL (ref 70–99)
HCT VFR BLD CALC: 33 % (ref 42–52)
HEMOGLOBIN: 10.6 G/DL (ref 14–18)
LYMPHOCYTES ABSOLUTE: 3.2 K/UL (ref 1.1–4.5)
LYMPHOCYTES RELATIVE PERCENT: 18.3 % (ref 20–40)
MCH RBC QN AUTO: 30.4 PG (ref 27–31)
MCHC RBC AUTO-ENTMCNC: 32.1 G/DL (ref 33–37)
MCV RBC AUTO: 94.6 FL (ref 80–94)
MONOCYTES ABSOLUTE: 1 K/UL (ref 0–0.9)
MONOCYTES RELATIVE PERCENT: 6 % (ref 0–10)
NEUTROPHILS ABSOLUTE: 12.7 K/UL (ref 1.5–7.5)
NEUTROPHILS RELATIVE PERCENT: 72.7 % (ref 50–65)
PDW BLD-RTO: 14.5 % (ref 11.5–14.5)
PERFORMED ON: ABNORMAL
PLATELET # BLD: 286 K/UL (ref 130–400)
PMV BLD AUTO: 10.5 FL (ref 9.4–12.4)
RBC # BLD: 3.49 M/UL (ref 4.7–6.1)
WBC # BLD: 17.4 K/UL (ref 4.8–10.8)

## 2019-01-15 PROCEDURE — 99232 SBSQ HOSP IP/OBS MODERATE 35: CPT | Performed by: FAMILY MEDICINE

## 2019-01-15 PROCEDURE — 6370000000 HC RX 637 (ALT 250 FOR IP): Performed by: EMERGENCY MEDICINE

## 2019-01-15 PROCEDURE — 1210000000 HC MED SURG R&B

## 2019-01-15 PROCEDURE — 85025 COMPLETE CBC W/AUTO DIFF WBC: CPT

## 2019-01-15 PROCEDURE — 87449 NOS EACH ORGANISM AG IA: CPT

## 2019-01-15 PROCEDURE — 2580000003 HC RX 258: Performed by: INTERNAL MEDICINE

## 2019-01-15 PROCEDURE — 6370000000 HC RX 637 (ALT 250 FOR IP): Performed by: INTERNAL MEDICINE

## 2019-01-15 PROCEDURE — 82948 REAGENT STRIP/BLOOD GLUCOSE: CPT

## 2019-01-15 PROCEDURE — 6360000002 HC RX W HCPCS: Performed by: FAMILY MEDICINE

## 2019-01-15 PROCEDURE — 2700000000 HC OXYGEN THERAPY PER DAY

## 2019-01-15 PROCEDURE — 94664 DEMO&/EVAL PT USE INHALER: CPT

## 2019-01-15 PROCEDURE — 6360000002 HC RX W HCPCS: Performed by: INTERNAL MEDICINE

## 2019-01-15 PROCEDURE — 94640 AIRWAY INHALATION TREATMENT: CPT

## 2019-01-15 PROCEDURE — 87899 AGENT NOS ASSAY W/OPTIC: CPT

## 2019-01-15 PROCEDURE — 36415 COLL VENOUS BLD VENIPUNCTURE: CPT

## 2019-01-15 RX ORDER — BISMUTH TRIBROMOPH/PETROLATUM 5"X9"
1 BANDAGE TOPICAL
Status: DISCONTINUED | OUTPATIENT
Start: 2019-01-15 | End: 2019-01-21 | Stop reason: HOSPADM

## 2019-01-15 RX ORDER — LEVOFLOXACIN 5 MG/ML
750 INJECTION, SOLUTION INTRAVENOUS
Status: DISCONTINUED | OUTPATIENT
Start: 2019-01-15 | End: 2019-01-20

## 2019-01-15 RX ADMIN — INSULIN LISPRO 3 UNITS: 100 INJECTION, SOLUTION INTRAVENOUS; SUBCUTANEOUS at 18:14

## 2019-01-15 RX ADMIN — APIXABAN 5 MG: 5 TABLET, FILM COATED ORAL at 09:28

## 2019-01-15 RX ADMIN — MECLIZINE 12.5 MG: 12.5 TABLET ORAL at 19:51

## 2019-01-15 RX ADMIN — CARVEDILOL 3.12 MG: 3.12 TABLET, FILM COATED ORAL at 19:52

## 2019-01-15 RX ADMIN — INSULIN LISPRO 2 UNITS: 100 INJECTION, SOLUTION INTRAVENOUS; SUBCUTANEOUS at 09:30

## 2019-01-15 RX ADMIN — CARVEDILOL 3.12 MG: 3.12 TABLET, FILM COATED ORAL at 09:28

## 2019-01-15 RX ADMIN — LEVOFLOXACIN 750 MG: 5 INJECTION, SOLUTION INTRAVENOUS at 17:51

## 2019-01-15 RX ADMIN — MECLIZINE 12.5 MG: 12.5 TABLET ORAL at 09:28

## 2019-01-15 RX ADMIN — IPRATROPIUM BROMIDE AND ALBUTEROL SULFATE 1 AMPULE: .5; 3 SOLUTION RESPIRATORY (INHALATION) at 19:31

## 2019-01-15 RX ADMIN — INSULIN LISPRO 6 UNITS: 100 INJECTION, SOLUTION INTRAVENOUS; SUBCUTANEOUS at 09:37

## 2019-01-15 RX ADMIN — INSULIN LISPRO 6 UNITS: 100 INJECTION, SOLUTION INTRAVENOUS; SUBCUTANEOUS at 18:14

## 2019-01-15 RX ADMIN — ISOSORBIDE DINITRATE 5 MG: 10 TABLET ORAL at 09:28

## 2019-01-15 RX ADMIN — SPIRONOLACTONE 25 MG: 50 TABLET ORAL at 09:28

## 2019-01-15 RX ADMIN — INSULIN LISPRO 3 UNITS: 100 INJECTION, SOLUTION INTRAVENOUS; SUBCUTANEOUS at 14:09

## 2019-01-15 RX ADMIN — INSULIN GLARGINE 40 UNITS: 100 INJECTION, SOLUTION SUBCUTANEOUS at 21:19

## 2019-01-15 RX ADMIN — FUROSEMIDE 40 MG: 40 TABLET ORAL at 09:28

## 2019-01-15 RX ADMIN — Medication 1 G: at 17:51

## 2019-01-15 RX ADMIN — Medication 10 ML: at 09:29

## 2019-01-15 RX ADMIN — ATORVASTATIN CALCIUM 10 MG: 10 TABLET, FILM COATED ORAL at 19:51

## 2019-01-15 RX ADMIN — APIXABAN 5 MG: 5 TABLET, FILM COATED ORAL at 19:52

## 2019-01-15 RX ADMIN — TIMOLOL MALEATE 1 DROP: 5 SOLUTION OPHTHALMIC at 09:28

## 2019-01-15 RX ADMIN — INSULIN LISPRO 6 UNITS: 100 INJECTION, SOLUTION INTRAVENOUS; SUBCUTANEOUS at 14:13

## 2019-01-15 RX ADMIN — INSULIN LISPRO 2 UNITS: 100 INJECTION, SOLUTION INTRAVENOUS; SUBCUTANEOUS at 21:19

## 2019-01-15 RX ADMIN — AMLODIPINE BESYLATE 10 MG: 10 TABLET ORAL at 09:28

## 2019-01-15 RX ADMIN — CLOPIDOGREL BISULFATE 75 MG: 75 TABLET ORAL at 09:28

## 2019-01-15 RX ADMIN — ISOSORBIDE DINITRATE 5 MG: 10 TABLET ORAL at 19:52

## 2019-01-15 RX ADMIN — TIMOLOL MALEATE 1 DROP: 5 SOLUTION OPHTHALMIC at 19:54

## 2019-01-15 ASSESSMENT — PAIN SCALES - GENERAL
PAINLEVEL_OUTOF10: 0
PAINLEVEL_OUTOF10: 0

## 2019-01-16 ENCOUNTER — LAB REQUISITION (OUTPATIENT)
Dept: LAB | Facility: HOSPITAL | Age: 84
End: 2019-01-16

## 2019-01-16 DIAGNOSIS — Z00.00 ROUTINE GENERAL MEDICAL EXAMINATION AT A HEALTH CARE FACILITY: ICD-10-CM

## 2019-01-16 PROBLEM — Z51.5 PALLIATIVE CARE PATIENT: Status: ACTIVE | Noted: 2018-11-02

## 2019-01-16 LAB
ANION GAP SERPL CALCULATED.3IONS-SCNC: 11 MMOL/L (ref 7–19)
BASOPHILS ABSOLUTE: 0 K/UL (ref 0–0.2)
BASOPHILS RELATIVE PERCENT: 0.2 % (ref 0–1)
BUN BLDV-MCNC: 41 MG/DL (ref 8–23)
CALCIUM SERPL-MCNC: 8.9 MG/DL (ref 8.2–9.6)
CHLORIDE BLD-SCNC: 109 MMOL/L (ref 98–111)
CO2: 24 MMOL/L (ref 22–29)
CREAT SERPL-MCNC: 1.5 MG/DL (ref 0.5–1.2)
EOSINOPHILS ABSOLUTE: 0.3 K/UL (ref 0–0.6)
EOSINOPHILS RELATIVE PERCENT: 2.1 % (ref 0–5)
GFR NON-AFRICAN AMERICAN: 44
GLUCOSE BLD-MCNC: 140 MG/DL (ref 70–99)
GLUCOSE BLD-MCNC: 155 MG/DL (ref 74–109)
GLUCOSE BLD-MCNC: 228 MG/DL (ref 70–99)
GLUCOSE BLD-MCNC: 265 MG/DL (ref 70–99)
GLUCOSE BLD-MCNC: 272 MG/DL (ref 70–99)
HCT VFR BLD CALC: 33.8 % (ref 42–52)
HEMOGLOBIN: 10.5 G/DL (ref 14–18)
LYMPHOCYTES ABSOLUTE: 3 K/UL (ref 1.1–4.5)
LYMPHOCYTES RELATIVE PERCENT: 21.6 % (ref 20–40)
MCH RBC QN AUTO: 30.4 PG (ref 27–31)
MCHC RBC AUTO-ENTMCNC: 31.1 G/DL (ref 33–37)
MCV RBC AUTO: 98 FL (ref 80–94)
MONOCYTES ABSOLUTE: 0.8 K/UL (ref 0–0.9)
MONOCYTES RELATIVE PERCENT: 6 % (ref 0–10)
NEUTROPHILS ABSOLUTE: 9.6 K/UL (ref 1.5–7.5)
NEUTROPHILS RELATIVE PERCENT: 68.8 % (ref 50–65)
PDW BLD-RTO: 14.6 % (ref 11.5–14.5)
PERFORMED ON: ABNORMAL
PLATELET # BLD: 292 K/UL (ref 130–400)
PMV BLD AUTO: 10.2 FL (ref 9.4–12.4)
POTASSIUM SERPL-SCNC: 4.8 MMOL/L (ref 3.5–5)
RBC # BLD: 3.45 M/UL (ref 4.7–6.1)
S PNEUM AG SPEC QL LA: NEGATIVE
SODIUM BLD-SCNC: 144 MMOL/L (ref 136–145)
WBC # BLD: 14 K/UL (ref 4.8–10.8)

## 2019-01-16 PROCEDURE — 85025 COMPLETE CBC W/AUTO DIFF WBC: CPT

## 2019-01-16 PROCEDURE — 82948 REAGENT STRIP/BLOOD GLUCOSE: CPT

## 2019-01-16 PROCEDURE — 80048 BASIC METABOLIC PNL TOTAL CA: CPT

## 2019-01-16 PROCEDURE — 94640 AIRWAY INHALATION TREATMENT: CPT

## 2019-01-16 PROCEDURE — 6370000000 HC RX 637 (ALT 250 FOR IP): Performed by: INTERNAL MEDICINE

## 2019-01-16 PROCEDURE — 6360000002 HC RX W HCPCS: Performed by: INTERNAL MEDICINE

## 2019-01-16 PROCEDURE — 36415 COLL VENOUS BLD VENIPUNCTURE: CPT

## 2019-01-16 PROCEDURE — 1210000000 HC MED SURG R&B

## 2019-01-16 PROCEDURE — 6370000000 HC RX 637 (ALT 250 FOR IP): Performed by: EMERGENCY MEDICINE

## 2019-01-16 PROCEDURE — 2700000000 HC OXYGEN THERAPY PER DAY

## 2019-01-16 PROCEDURE — 99232 SBSQ HOSP IP/OBS MODERATE 35: CPT | Performed by: FAMILY MEDICINE

## 2019-01-16 PROCEDURE — 2580000003 HC RX 258: Performed by: INTERNAL MEDICINE

## 2019-01-16 RX ADMIN — MECLIZINE 12.5 MG: 12.5 TABLET ORAL at 20:32

## 2019-01-16 RX ADMIN — IPRATROPIUM BROMIDE AND ALBUTEROL SULFATE 1 AMPULE: .5; 3 SOLUTION RESPIRATORY (INHALATION) at 18:28

## 2019-01-16 RX ADMIN — IPRATROPIUM BROMIDE AND ALBUTEROL SULFATE 1 AMPULE: .5; 3 SOLUTION RESPIRATORY (INHALATION) at 06:42

## 2019-01-16 RX ADMIN — INSULIN LISPRO 6 UNITS: 100 INJECTION, SOLUTION INTRAVENOUS; SUBCUTANEOUS at 18:35

## 2019-01-16 RX ADMIN — Medication 10 ML: at 10:35

## 2019-01-16 RX ADMIN — ISOSORBIDE DINITRATE 5 MG: 10 TABLET ORAL at 20:31

## 2019-01-16 RX ADMIN — TIMOLOL MALEATE 1 DROP: 5 SOLUTION OPHTHALMIC at 10:35

## 2019-01-16 RX ADMIN — CLOPIDOGREL BISULFATE 75 MG: 75 TABLET ORAL at 10:34

## 2019-01-16 RX ADMIN — ATORVASTATIN CALCIUM 10 MG: 10 TABLET, FILM COATED ORAL at 20:31

## 2019-01-16 RX ADMIN — Medication 10 ML: at 20:32

## 2019-01-16 RX ADMIN — APIXABAN 5 MG: 5 TABLET, FILM COATED ORAL at 20:32

## 2019-01-16 RX ADMIN — ISOSORBIDE DINITRATE 5 MG: 10 TABLET ORAL at 10:34

## 2019-01-16 RX ADMIN — IPRATROPIUM BROMIDE AND ALBUTEROL SULFATE 1 AMPULE: .5; 3 SOLUTION RESPIRATORY (INHALATION) at 15:12

## 2019-01-16 RX ADMIN — INSULIN LISPRO 3 UNITS: 100 INJECTION, SOLUTION INTRAVENOUS; SUBCUTANEOUS at 13:06

## 2019-01-16 RX ADMIN — INSULIN LISPRO 2 UNITS: 100 INJECTION, SOLUTION INTRAVENOUS; SUBCUTANEOUS at 18:35

## 2019-01-16 RX ADMIN — FUROSEMIDE 40 MG: 40 TABLET ORAL at 10:35

## 2019-01-16 RX ADMIN — SPIRONOLACTONE 25 MG: 50 TABLET ORAL at 10:34

## 2019-01-16 RX ADMIN — MECLIZINE 12.5 MG: 12.5 TABLET ORAL at 10:34

## 2019-01-16 RX ADMIN — TIMOLOL MALEATE 1 DROP: 5 SOLUTION OPHTHALMIC at 20:33

## 2019-01-16 RX ADMIN — CARVEDILOL 3.12 MG: 3.12 TABLET, FILM COATED ORAL at 20:31

## 2019-01-16 RX ADMIN — CARVEDILOL 3.12 MG: 3.12 TABLET, FILM COATED ORAL at 10:34

## 2019-01-16 RX ADMIN — AMLODIPINE BESYLATE 10 MG: 10 TABLET ORAL at 10:34

## 2019-01-16 RX ADMIN — INSULIN LISPRO 6 UNITS: 100 INJECTION, SOLUTION INTRAVENOUS; SUBCUTANEOUS at 12:57

## 2019-01-16 RX ADMIN — APIXABAN 5 MG: 5 TABLET, FILM COATED ORAL at 10:34

## 2019-01-16 RX ADMIN — IPRATROPIUM BROMIDE AND ALBUTEROL SULFATE 1 AMPULE: .5; 3 SOLUTION RESPIRATORY (INHALATION) at 10:59

## 2019-01-16 RX ADMIN — INSULIN GLARGINE 40 UNITS: 100 INJECTION, SOLUTION SUBCUTANEOUS at 20:35

## 2019-01-16 RX ADMIN — Medication 1 G: at 18:34

## 2019-01-17 PROBLEM — J96.01 ACUTE RESPIRATORY FAILURE WITH HYPOXEMIA (HCC): Status: ACTIVE | Noted: 2019-01-15

## 2019-01-17 LAB
ANION GAP SERPL CALCULATED.3IONS-SCNC: 12 MMOL/L (ref 7–19)
BASOPHILS ABSOLUTE: 0.1 K/UL (ref 0–0.2)
BASOPHILS RELATIVE PERCENT: 0.3 % (ref 0–1)
BUN BLDV-MCNC: 41 MG/DL (ref 8–23)
CALCIUM SERPL-MCNC: 8.7 MG/DL (ref 8.2–9.6)
CHLORIDE BLD-SCNC: 110 MMOL/L (ref 98–111)
CO2: 22 MMOL/L (ref 22–29)
CREAT SERPL-MCNC: 1.4 MG/DL (ref 0.5–1.2)
EOSINOPHILS ABSOLUTE: 0.3 K/UL (ref 0–0.6)
EOSINOPHILS RELATIVE PERCENT: 2.1 % (ref 0–5)
GFR NON-AFRICAN AMERICAN: 47
GLUCOSE BLD-MCNC: 106 MG/DL (ref 74–109)
GLUCOSE BLD-MCNC: 113 MG/DL (ref 70–99)
GLUCOSE BLD-MCNC: 290 MG/DL (ref 70–99)
GLUCOSE BLD-MCNC: 309 MG/DL (ref 70–99)
GLUCOSE BLD-MCNC: 320 MG/DL (ref 70–99)
HCT VFR BLD CALC: 31.4 % (ref 42–52)
HEMOGLOBIN: 9.9 G/DL (ref 14–18)
LYMPHOCYTES ABSOLUTE: 3.1 K/UL (ref 1.1–4.5)
LYMPHOCYTES RELATIVE PERCENT: 21.1 % (ref 20–40)
MCH RBC QN AUTO: 30.2 PG (ref 27–31)
MCHC RBC AUTO-ENTMCNC: 31.5 G/DL (ref 33–37)
MCV RBC AUTO: 95.7 FL (ref 80–94)
MONOCYTES ABSOLUTE: 0.8 K/UL (ref 0–0.9)
MONOCYTES RELATIVE PERCENT: 5.4 % (ref 0–10)
NEUTROPHILS ABSOLUTE: 10.2 K/UL (ref 1.5–7.5)
NEUTROPHILS RELATIVE PERCENT: 70.1 % (ref 50–65)
PDW BLD-RTO: 14.6 % (ref 11.5–14.5)
PERFORMED ON: ABNORMAL
PLATELET # BLD: 313 K/UL (ref 130–400)
PMV BLD AUTO: 10.3 FL (ref 9.4–12.4)
POTASSIUM SERPL-SCNC: 4.4 MMOL/L (ref 3.5–5)
RBC # BLD: 3.28 M/UL (ref 4.7–6.1)
SODIUM BLD-SCNC: 144 MMOL/L (ref 136–145)
STREP PNEUMONIAE ANTIGEN, URINE: NORMAL
WBC # BLD: 14.5 K/UL (ref 4.8–10.8)

## 2019-01-17 PROCEDURE — 82948 REAGENT STRIP/BLOOD GLUCOSE: CPT

## 2019-01-17 PROCEDURE — 6370000000 HC RX 637 (ALT 250 FOR IP): Performed by: EMERGENCY MEDICINE

## 2019-01-17 PROCEDURE — 6370000000 HC RX 637 (ALT 250 FOR IP): Performed by: INTERNAL MEDICINE

## 2019-01-17 PROCEDURE — 1210000000 HC MED SURG R&B

## 2019-01-17 PROCEDURE — 36415 COLL VENOUS BLD VENIPUNCTURE: CPT

## 2019-01-17 PROCEDURE — 6360000002 HC RX W HCPCS: Performed by: INTERNAL MEDICINE

## 2019-01-17 PROCEDURE — 85025 COMPLETE CBC W/AUTO DIFF WBC: CPT

## 2019-01-17 PROCEDURE — 99232 SBSQ HOSP IP/OBS MODERATE 35: CPT | Performed by: FAMILY MEDICINE

## 2019-01-17 PROCEDURE — 6360000002 HC RX W HCPCS: Performed by: FAMILY MEDICINE

## 2019-01-17 PROCEDURE — 94640 AIRWAY INHALATION TREATMENT: CPT

## 2019-01-17 PROCEDURE — 80048 BASIC METABOLIC PNL TOTAL CA: CPT

## 2019-01-17 PROCEDURE — 2580000003 HC RX 258: Performed by: INTERNAL MEDICINE

## 2019-01-17 PROCEDURE — 2700000000 HC OXYGEN THERAPY PER DAY

## 2019-01-17 RX ADMIN — TIMOLOL MALEATE 1 DROP: 5 SOLUTION OPHTHALMIC at 10:18

## 2019-01-17 RX ADMIN — INSULIN LISPRO 6 UNITS: 100 INJECTION, SOLUTION INTRAVENOUS; SUBCUTANEOUS at 17:27

## 2019-01-17 RX ADMIN — SPIRONOLACTONE 25 MG: 50 TABLET ORAL at 10:14

## 2019-01-17 RX ADMIN — Medication 10 ML: at 10:17

## 2019-01-17 RX ADMIN — APIXABAN 5 MG: 5 TABLET, FILM COATED ORAL at 20:56

## 2019-01-17 RX ADMIN — LEVOFLOXACIN 750 MG: 5 INJECTION, SOLUTION INTRAVENOUS at 15:37

## 2019-01-17 RX ADMIN — MECLIZINE 12.5 MG: 12.5 TABLET ORAL at 10:15

## 2019-01-17 RX ADMIN — CARVEDILOL 3.12 MG: 3.12 TABLET, FILM COATED ORAL at 10:14

## 2019-01-17 RX ADMIN — ISOSORBIDE DINITRATE 5 MG: 10 TABLET ORAL at 10:15

## 2019-01-17 RX ADMIN — CARVEDILOL 3.12 MG: 3.12 TABLET, FILM COATED ORAL at 20:56

## 2019-01-17 RX ADMIN — INSULIN LISPRO 2 UNITS: 100 INJECTION, SOLUTION INTRAVENOUS; SUBCUTANEOUS at 20:54

## 2019-01-17 RX ADMIN — MECLIZINE 12.5 MG: 12.5 TABLET ORAL at 20:56

## 2019-01-17 RX ADMIN — INSULIN LISPRO 6 UNITS: 100 INJECTION, SOLUTION INTRAVENOUS; SUBCUTANEOUS at 12:26

## 2019-01-17 RX ADMIN — TIMOLOL MALEATE 1 DROP: 5 SOLUTION OPHTHALMIC at 20:56

## 2019-01-17 RX ADMIN — IPRATROPIUM BROMIDE AND ALBUTEROL SULFATE 1 AMPULE: .5; 3 SOLUTION RESPIRATORY (INHALATION) at 16:11

## 2019-01-17 RX ADMIN — Medication 1 G: at 15:37

## 2019-01-17 RX ADMIN — APIXABAN 5 MG: 5 TABLET, FILM COATED ORAL at 10:14

## 2019-01-17 RX ADMIN — Medication 10 ML: at 20:56

## 2019-01-17 RX ADMIN — CLOPIDOGREL BISULFATE 75 MG: 75 TABLET ORAL at 10:14

## 2019-01-17 RX ADMIN — IPRATROPIUM BROMIDE AND ALBUTEROL SULFATE 1 AMPULE: .5; 3 SOLUTION RESPIRATORY (INHALATION) at 21:00

## 2019-01-17 RX ADMIN — INSULIN GLARGINE 40 UNITS: 100 INJECTION, SOLUTION SUBCUTANEOUS at 20:54

## 2019-01-17 RX ADMIN — AMLODIPINE BESYLATE 10 MG: 10 TABLET ORAL at 10:15

## 2019-01-17 RX ADMIN — INSULIN LISPRO 4 UNITS: 100 INJECTION, SOLUTION INTRAVENOUS; SUBCUTANEOUS at 17:27

## 2019-01-17 RX ADMIN — ATORVASTATIN CALCIUM 10 MG: 10 TABLET, FILM COATED ORAL at 20:56

## 2019-01-17 RX ADMIN — IPRATROPIUM BROMIDE AND ALBUTEROL SULFATE 1 AMPULE: .5; 3 SOLUTION RESPIRATORY (INHALATION) at 06:41

## 2019-01-17 RX ADMIN — ISOSORBIDE DINITRATE 5 MG: 10 TABLET ORAL at 20:56

## 2019-01-17 RX ADMIN — FUROSEMIDE 40 MG: 40 TABLET ORAL at 10:15

## 2019-01-17 RX ADMIN — IPRATROPIUM BROMIDE AND ALBUTEROL SULFATE 1 AMPULE: .5; 3 SOLUTION RESPIRATORY (INHALATION) at 10:47

## 2019-01-17 RX ADMIN — INSULIN LISPRO 3 UNITS: 100 INJECTION, SOLUTION INTRAVENOUS; SUBCUTANEOUS at 12:25

## 2019-01-18 LAB
ANION GAP SERPL CALCULATED.3IONS-SCNC: 11 MMOL/L (ref 7–19)
BASOPHILS ABSOLUTE: 0 K/UL (ref 0–0.2)
BASOPHILS RELATIVE PERCENT: 0.1 % (ref 0–1)
BUN BLDV-MCNC: 44 MG/DL (ref 8–23)
CALCIUM SERPL-MCNC: 8.3 MG/DL (ref 8.2–9.6)
CHLORIDE BLD-SCNC: 105 MMOL/L (ref 98–111)
CO2: 25 MMOL/L (ref 22–29)
CREAT SERPL-MCNC: 1.7 MG/DL (ref 0.5–1.2)
EOSINOPHILS ABSOLUTE: 0.3 K/UL (ref 0–0.6)
EOSINOPHILS RELATIVE PERCENT: 2.1 % (ref 0–5)
GFR NON-AFRICAN AMERICAN: 38
GLUCOSE BLD-MCNC: 144 MG/DL (ref 70–99)
GLUCOSE BLD-MCNC: 202 MG/DL (ref 74–109)
GLUCOSE BLD-MCNC: 249 MG/DL (ref 70–99)
GLUCOSE BLD-MCNC: 282 MG/DL (ref 70–99)
GLUCOSE BLD-MCNC: 328 MG/DL (ref 70–99)
HCT VFR BLD CALC: 29.6 % (ref 42–52)
HEMOGLOBIN: 9.3 G/DL (ref 14–18)
L. PNEUMOPHILA SEROGP 1 UR AG: NEGATIVE
LYMPHOCYTES ABSOLUTE: 2.8 K/UL (ref 1.1–4.5)
LYMPHOCYTES RELATIVE PERCENT: 20.4 % (ref 20–40)
MCH RBC QN AUTO: 30.6 PG (ref 27–31)
MCHC RBC AUTO-ENTMCNC: 31.4 G/DL (ref 33–37)
MCV RBC AUTO: 97.4 FL (ref 80–94)
MONOCYTES ABSOLUTE: 0.7 K/UL (ref 0–0.9)
MONOCYTES RELATIVE PERCENT: 5.4 % (ref 0–10)
NEUTROPHILS ABSOLUTE: 9.6 K/UL (ref 1.5–7.5)
NEUTROPHILS RELATIVE PERCENT: 71.3 % (ref 50–65)
PDW BLD-RTO: 14.6 % (ref 11.5–14.5)
PERFORMED ON: ABNORMAL
PLATELET # BLD: 296 K/UL (ref 130–400)
PMV BLD AUTO: 10.1 FL (ref 9.4–12.4)
POTASSIUM SERPL-SCNC: 4.7 MMOL/L (ref 3.5–5)
RBC # BLD: 3.04 M/UL (ref 4.7–6.1)
SODIUM BLD-SCNC: 141 MMOL/L (ref 136–145)
WBC # BLD: 13.5 K/UL (ref 4.8–10.8)

## 2019-01-18 PROCEDURE — 85025 COMPLETE CBC W/AUTO DIFF WBC: CPT

## 2019-01-18 PROCEDURE — 6370000000 HC RX 637 (ALT 250 FOR IP): Performed by: INTERNAL MEDICINE

## 2019-01-18 PROCEDURE — 2700000000 HC OXYGEN THERAPY PER DAY

## 2019-01-18 PROCEDURE — 6370000000 HC RX 637 (ALT 250 FOR IP): Performed by: EMERGENCY MEDICINE

## 2019-01-18 PROCEDURE — 1210000000 HC MED SURG R&B

## 2019-01-18 PROCEDURE — 6360000002 HC RX W HCPCS: Performed by: INTERNAL MEDICINE

## 2019-01-18 PROCEDURE — 36415 COLL VENOUS BLD VENIPUNCTURE: CPT

## 2019-01-18 PROCEDURE — 80048 BASIC METABOLIC PNL TOTAL CA: CPT

## 2019-01-18 PROCEDURE — 97165 OT EVAL LOW COMPLEX 30 MIN: CPT

## 2019-01-18 PROCEDURE — 82948 REAGENT STRIP/BLOOD GLUCOSE: CPT

## 2019-01-18 PROCEDURE — 97750 PHYSICAL PERFORMANCE TEST: CPT

## 2019-01-18 PROCEDURE — 94640 AIRWAY INHALATION TREATMENT: CPT

## 2019-01-18 PROCEDURE — 97530 THERAPEUTIC ACTIVITIES: CPT

## 2019-01-18 PROCEDURE — 97161 PT EVAL LOW COMPLEX 20 MIN: CPT

## 2019-01-18 PROCEDURE — 2580000003 HC RX 258: Performed by: INTERNAL MEDICINE

## 2019-01-18 PROCEDURE — 99232 SBSQ HOSP IP/OBS MODERATE 35: CPT | Performed by: FAMILY MEDICINE

## 2019-01-18 PROCEDURE — 6370000000 HC RX 637 (ALT 250 FOR IP): Performed by: FAMILY MEDICINE

## 2019-01-18 PROCEDURE — 97116 GAIT TRAINING THERAPY: CPT

## 2019-01-18 RX ADMIN — INSULIN LISPRO 6 UNITS: 100 INJECTION, SOLUTION INTRAVENOUS; SUBCUTANEOUS at 17:50

## 2019-01-18 RX ADMIN — IPRATROPIUM BROMIDE AND ALBUTEROL SULFATE 1 AMPULE: .5; 3 SOLUTION RESPIRATORY (INHALATION) at 18:59

## 2019-01-18 RX ADMIN — SPIRONOLACTONE 25 MG: 50 TABLET ORAL at 07:50

## 2019-01-18 RX ADMIN — CARVEDILOL 3.12 MG: 3.12 TABLET, FILM COATED ORAL at 21:44

## 2019-01-18 RX ADMIN — INSULIN LISPRO 2 UNITS: 100 INJECTION, SOLUTION INTRAVENOUS; SUBCUTANEOUS at 12:41

## 2019-01-18 RX ADMIN — Medication 1 EACH: at 17:58

## 2019-01-18 RX ADMIN — CARVEDILOL 3.12 MG: 3.12 TABLET, FILM COATED ORAL at 08:00

## 2019-01-18 RX ADMIN — Medication 10 ML: at 07:56

## 2019-01-18 RX ADMIN — APIXABAN 5 MG: 5 TABLET, FILM COATED ORAL at 08:00

## 2019-01-18 RX ADMIN — TIMOLOL MALEATE 1 DROP: 5 SOLUTION OPHTHALMIC at 21:43

## 2019-01-18 RX ADMIN — INSULIN GLARGINE 40 UNITS: 100 INJECTION, SOLUTION SUBCUTANEOUS at 21:43

## 2019-01-18 RX ADMIN — IPRATROPIUM BROMIDE AND ALBUTEROL SULFATE 1 AMPULE: .5; 3 SOLUTION RESPIRATORY (INHALATION) at 06:46

## 2019-01-18 RX ADMIN — AMLODIPINE BESYLATE 10 MG: 10 TABLET ORAL at 07:52

## 2019-01-18 RX ADMIN — TIMOLOL MALEATE 1 DROP: 5 SOLUTION OPHTHALMIC at 07:53

## 2019-01-18 RX ADMIN — IPRATROPIUM BROMIDE AND ALBUTEROL SULFATE 1 AMPULE: .5; 3 SOLUTION RESPIRATORY (INHALATION) at 14:58

## 2019-01-18 RX ADMIN — INSULIN LISPRO 2 UNITS: 100 INJECTION, SOLUTION INTRAVENOUS; SUBCUTANEOUS at 21:43

## 2019-01-18 RX ADMIN — ISOSORBIDE DINITRATE 5 MG: 10 TABLET ORAL at 21:43

## 2019-01-18 RX ADMIN — MECLIZINE 12.5 MG: 12.5 TABLET ORAL at 07:51

## 2019-01-18 RX ADMIN — INSULIN LISPRO 6 UNITS: 100 INJECTION, SOLUTION INTRAVENOUS; SUBCUTANEOUS at 12:41

## 2019-01-18 RX ADMIN — APIXABAN 5 MG: 5 TABLET, FILM COATED ORAL at 21:42

## 2019-01-18 RX ADMIN — Medication 1 G: at 17:50

## 2019-01-18 RX ADMIN — CLOPIDOGREL BISULFATE 75 MG: 75 TABLET ORAL at 07:51

## 2019-01-18 RX ADMIN — INSULIN LISPRO 4 UNITS: 100 INJECTION, SOLUTION INTRAVENOUS; SUBCUTANEOUS at 17:52

## 2019-01-18 RX ADMIN — MECLIZINE 12.5 MG: 12.5 TABLET ORAL at 21:43

## 2019-01-18 RX ADMIN — FUROSEMIDE 40 MG: 40 TABLET ORAL at 07:51

## 2019-01-18 RX ADMIN — Medication 10 ML: at 21:43

## 2019-01-18 RX ADMIN — ATORVASTATIN CALCIUM 10 MG: 10 TABLET, FILM COATED ORAL at 21:42

## 2019-01-18 RX ADMIN — IPRATROPIUM BROMIDE AND ALBUTEROL SULFATE 1 AMPULE: .5; 3 SOLUTION RESPIRATORY (INHALATION) at 10:50

## 2019-01-18 RX ADMIN — ISOSORBIDE DINITRATE 5 MG: 10 TABLET ORAL at 07:52

## 2019-01-18 ASSESSMENT — PAIN SCALES - GENERAL: PAINLEVEL_OUTOF10: 0

## 2019-01-19 LAB
ANION GAP SERPL CALCULATED.3IONS-SCNC: 14 MMOL/L (ref 7–19)
BASOPHILS ABSOLUTE: 0 K/UL (ref 0–0.2)
BASOPHILS RELATIVE PERCENT: 0.3 % (ref 0–1)
BLOOD CULTURE, ROUTINE: NORMAL
BUN BLDV-MCNC: 41 MG/DL (ref 8–23)
CALCIUM SERPL-MCNC: 8.8 MG/DL (ref 8.2–9.6)
CHLORIDE BLD-SCNC: 106 MMOL/L (ref 98–111)
CO2: 24 MMOL/L (ref 22–29)
CREAT SERPL-MCNC: 1.6 MG/DL (ref 0.5–1.2)
CULTURE, BLOOD 2: NORMAL
EOSINOPHILS ABSOLUTE: 0.3 K/UL (ref 0–0.6)
EOSINOPHILS RELATIVE PERCENT: 2.5 % (ref 0–5)
GFR NON-AFRICAN AMERICAN: 40
GLUCOSE BLD-MCNC: 123 MG/DL (ref 70–99)
GLUCOSE BLD-MCNC: 162 MG/DL (ref 74–109)
GLUCOSE BLD-MCNC: 239 MG/DL (ref 70–99)
GLUCOSE BLD-MCNC: 352 MG/DL (ref 70–99)
GLUCOSE BLD-MCNC: 372 MG/DL (ref 70–99)
HCT VFR BLD CALC: 31.2 % (ref 42–52)
HEMOGLOBIN: 9.6 G/DL (ref 14–18)
LYMPHOCYTES ABSOLUTE: 2.6 K/UL (ref 1.1–4.5)
LYMPHOCYTES RELATIVE PERCENT: 22.3 % (ref 20–40)
MCH RBC QN AUTO: 30.2 PG (ref 27–31)
MCHC RBC AUTO-ENTMCNC: 30.8 G/DL (ref 33–37)
MCV RBC AUTO: 98.1 FL (ref 80–94)
MONOCYTES ABSOLUTE: 0.7 K/UL (ref 0–0.9)
MONOCYTES RELATIVE PERCENT: 6 % (ref 0–10)
NEUTROPHILS ABSOLUTE: 8 K/UL (ref 1.5–7.5)
NEUTROPHILS RELATIVE PERCENT: 68.4 % (ref 50–65)
PDW BLD-RTO: 14.5 % (ref 11.5–14.5)
PERFORMED ON: ABNORMAL
PLATELET # BLD: 339 K/UL (ref 130–400)
PMV BLD AUTO: 10.2 FL (ref 9.4–12.4)
POTASSIUM SERPL-SCNC: 4.4 MMOL/L (ref 3.5–5)
RBC # BLD: 3.18 M/UL (ref 4.7–6.1)
SODIUM BLD-SCNC: 144 MMOL/L (ref 136–145)
WBC # BLD: 11.7 K/UL (ref 4.8–10.8)

## 2019-01-19 PROCEDURE — 85025 COMPLETE CBC W/AUTO DIFF WBC: CPT

## 2019-01-19 PROCEDURE — 97110 THERAPEUTIC EXERCISES: CPT

## 2019-01-19 PROCEDURE — 94640 AIRWAY INHALATION TREATMENT: CPT

## 2019-01-19 PROCEDURE — 6370000000 HC RX 637 (ALT 250 FOR IP): Performed by: INTERNAL MEDICINE

## 2019-01-19 PROCEDURE — 36415 COLL VENOUS BLD VENIPUNCTURE: CPT

## 2019-01-19 PROCEDURE — 82948 REAGENT STRIP/BLOOD GLUCOSE: CPT

## 2019-01-19 PROCEDURE — 80048 BASIC METABOLIC PNL TOTAL CA: CPT

## 2019-01-19 PROCEDURE — 2580000003 HC RX 258: Performed by: INTERNAL MEDICINE

## 2019-01-19 PROCEDURE — 6370000000 HC RX 637 (ALT 250 FOR IP): Performed by: EMERGENCY MEDICINE

## 2019-01-19 PROCEDURE — 1210000000 HC MED SURG R&B

## 2019-01-19 PROCEDURE — 99232 SBSQ HOSP IP/OBS MODERATE 35: CPT | Performed by: FAMILY MEDICINE

## 2019-01-19 PROCEDURE — 2700000000 HC OXYGEN THERAPY PER DAY

## 2019-01-19 PROCEDURE — 97116 GAIT TRAINING THERAPY: CPT

## 2019-01-19 PROCEDURE — 6360000002 HC RX W HCPCS: Performed by: FAMILY MEDICINE

## 2019-01-19 PROCEDURE — 6360000002 HC RX W HCPCS: Performed by: INTERNAL MEDICINE

## 2019-01-19 RX ADMIN — IPRATROPIUM BROMIDE AND ALBUTEROL SULFATE 1 AMPULE: .5; 3 SOLUTION RESPIRATORY (INHALATION) at 19:01

## 2019-01-19 RX ADMIN — INSULIN LISPRO 6 UNITS: 100 INJECTION, SOLUTION INTRAVENOUS; SUBCUTANEOUS at 17:13

## 2019-01-19 RX ADMIN — INSULIN LISPRO 3 UNITS: 100 INJECTION, SOLUTION INTRAVENOUS; SUBCUTANEOUS at 22:07

## 2019-01-19 RX ADMIN — IPRATROPIUM BROMIDE AND ALBUTEROL SULFATE 1 AMPULE: .5; 3 SOLUTION RESPIRATORY (INHALATION) at 10:32

## 2019-01-19 RX ADMIN — FUROSEMIDE 40 MG: 40 TABLET ORAL at 08:45

## 2019-01-19 RX ADMIN — ISOSORBIDE DINITRATE 5 MG: 10 TABLET ORAL at 20:32

## 2019-01-19 RX ADMIN — TIMOLOL MALEATE 1 DROP: 5 SOLUTION OPHTHALMIC at 08:43

## 2019-01-19 RX ADMIN — CLOPIDOGREL BISULFATE 75 MG: 75 TABLET ORAL at 08:44

## 2019-01-19 RX ADMIN — ISOSORBIDE DINITRATE 5 MG: 10 TABLET ORAL at 08:45

## 2019-01-19 RX ADMIN — TIMOLOL MALEATE 1 DROP: 5 SOLUTION OPHTHALMIC at 20:33

## 2019-01-19 RX ADMIN — MECLIZINE 12.5 MG: 12.5 TABLET ORAL at 08:43

## 2019-01-19 RX ADMIN — SPIRONOLACTONE 25 MG: 50 TABLET ORAL at 08:43

## 2019-01-19 RX ADMIN — AMLODIPINE BESYLATE 10 MG: 10 TABLET ORAL at 08:44

## 2019-01-19 RX ADMIN — IPRATROPIUM BROMIDE AND ALBUTEROL SULFATE 1 AMPULE: .5; 3 SOLUTION RESPIRATORY (INHALATION) at 14:23

## 2019-01-19 RX ADMIN — INSULIN GLARGINE 40 UNITS: 100 INJECTION, SOLUTION SUBCUTANEOUS at 22:06

## 2019-01-19 RX ADMIN — Medication 1 G: at 16:55

## 2019-01-19 RX ADMIN — Medication 10 ML: at 08:46

## 2019-01-19 RX ADMIN — APIXABAN 5 MG: 5 TABLET, FILM COATED ORAL at 08:43

## 2019-01-19 RX ADMIN — MECLIZINE 12.5 MG: 12.5 TABLET ORAL at 20:33

## 2019-01-19 RX ADMIN — ATORVASTATIN CALCIUM 10 MG: 10 TABLET, FILM COATED ORAL at 20:32

## 2019-01-19 RX ADMIN — LEVOFLOXACIN 750 MG: 5 INJECTION, SOLUTION INTRAVENOUS at 16:55

## 2019-01-19 RX ADMIN — APIXABAN 5 MG: 5 TABLET, FILM COATED ORAL at 20:32

## 2019-01-19 RX ADMIN — IPRATROPIUM BROMIDE AND ALBUTEROL SULFATE 1 AMPULE: .5; 3 SOLUTION RESPIRATORY (INHALATION) at 06:35

## 2019-01-19 RX ADMIN — CARVEDILOL 3.12 MG: 3.12 TABLET, FILM COATED ORAL at 08:45

## 2019-01-19 RX ADMIN — CARVEDILOL 3.12 MG: 3.12 TABLET, FILM COATED ORAL at 20:33

## 2019-01-19 RX ADMIN — INSULIN LISPRO 5 UNITS: 100 INJECTION, SOLUTION INTRAVENOUS; SUBCUTANEOUS at 16:55

## 2019-01-19 ASSESSMENT — PAIN SCALES - GENERAL: PAINLEVEL_OUTOF10: 0

## 2019-01-20 LAB
ANION GAP SERPL CALCULATED.3IONS-SCNC: 13 MMOL/L (ref 7–19)
BASOPHILS ABSOLUTE: 0 K/UL (ref 0–0.2)
BASOPHILS RELATIVE PERCENT: 0.3 % (ref 0–1)
BUN BLDV-MCNC: 39 MG/DL (ref 8–23)
CALCIUM SERPL-MCNC: 8.2 MG/DL (ref 8.2–9.6)
CHLORIDE BLD-SCNC: 108 MMOL/L (ref 98–111)
CO2: 23 MMOL/L (ref 22–29)
CREAT SERPL-MCNC: 1.5 MG/DL (ref 0.5–1.2)
EOSINOPHILS ABSOLUTE: 0.3 K/UL (ref 0–0.6)
EOSINOPHILS RELATIVE PERCENT: 2.8 % (ref 0–5)
GFR NON-AFRICAN AMERICAN: 44
GLUCOSE BLD-MCNC: 132 MG/DL (ref 70–99)
GLUCOSE BLD-MCNC: 181 MG/DL (ref 74–109)
GLUCOSE BLD-MCNC: 206 MG/DL (ref 70–99)
GLUCOSE BLD-MCNC: 208 MG/DL (ref 70–99)
GLUCOSE BLD-MCNC: 316 MG/DL (ref 70–99)
HCT VFR BLD CALC: 30.6 % (ref 42–52)
HEMOGLOBIN: 9.8 G/DL (ref 14–18)
LYMPHOCYTES ABSOLUTE: 2.9 K/UL (ref 1.1–4.5)
LYMPHOCYTES RELATIVE PERCENT: 26.9 % (ref 20–40)
MCH RBC QN AUTO: 30.2 PG (ref 27–31)
MCHC RBC AUTO-ENTMCNC: 32 G/DL (ref 33–37)
MCV RBC AUTO: 94.2 FL (ref 80–94)
MONOCYTES ABSOLUTE: 0.7 K/UL (ref 0–0.9)
MONOCYTES RELATIVE PERCENT: 7 % (ref 0–10)
NEUTROPHILS ABSOLUTE: 6.6 K/UL (ref 1.5–7.5)
NEUTROPHILS RELATIVE PERCENT: 62.3 % (ref 50–65)
PDW BLD-RTO: 14.1 % (ref 11.5–14.5)
PERFORMED ON: ABNORMAL
PLATELET # BLD: 347 K/UL (ref 130–400)
PMV BLD AUTO: 10 FL (ref 9.4–12.4)
POTASSIUM SERPL-SCNC: 4.8 MMOL/L (ref 3.5–5)
RBC # BLD: 3.25 M/UL (ref 4.7–6.1)
SODIUM BLD-SCNC: 144 MMOL/L (ref 136–145)
WBC # BLD: 10.6 K/UL (ref 4.8–10.8)

## 2019-01-20 PROCEDURE — 80048 BASIC METABOLIC PNL TOTAL CA: CPT

## 2019-01-20 PROCEDURE — 2700000000 HC OXYGEN THERAPY PER DAY

## 2019-01-20 PROCEDURE — 6370000000 HC RX 637 (ALT 250 FOR IP): Performed by: FAMILY MEDICINE

## 2019-01-20 PROCEDURE — 6370000000 HC RX 637 (ALT 250 FOR IP): Performed by: EMERGENCY MEDICINE

## 2019-01-20 PROCEDURE — 97116 GAIT TRAINING THERAPY: CPT

## 2019-01-20 PROCEDURE — 6370000000 HC RX 637 (ALT 250 FOR IP): Performed by: INTERNAL MEDICINE

## 2019-01-20 PROCEDURE — 99232 SBSQ HOSP IP/OBS MODERATE 35: CPT | Performed by: FAMILY MEDICINE

## 2019-01-20 PROCEDURE — 97530 THERAPEUTIC ACTIVITIES: CPT

## 2019-01-20 PROCEDURE — 85025 COMPLETE CBC W/AUTO DIFF WBC: CPT

## 2019-01-20 PROCEDURE — 82948 REAGENT STRIP/BLOOD GLUCOSE: CPT

## 2019-01-20 PROCEDURE — 36415 COLL VENOUS BLD VENIPUNCTURE: CPT

## 2019-01-20 PROCEDURE — 1210000000 HC MED SURG R&B

## 2019-01-20 PROCEDURE — 94640 AIRWAY INHALATION TREATMENT: CPT

## 2019-01-20 RX ORDER — CEFDINIR 300 MG/1
300 CAPSULE ORAL EVERY 12 HOURS SCHEDULED
Status: DISCONTINUED | OUTPATIENT
Start: 2019-01-20 | End: 2019-01-21 | Stop reason: HOSPADM

## 2019-01-20 RX ORDER — LEVOFLOXACIN 750 MG/1
750 TABLET ORAL
Status: DISCONTINUED | OUTPATIENT
Start: 2019-01-21 | End: 2019-01-21 | Stop reason: HOSPADM

## 2019-01-20 RX ADMIN — ATORVASTATIN CALCIUM 10 MG: 10 TABLET, FILM COATED ORAL at 20:41

## 2019-01-20 RX ADMIN — FUROSEMIDE 40 MG: 40 TABLET ORAL at 09:33

## 2019-01-20 RX ADMIN — CARVEDILOL 3.12 MG: 3.12 TABLET, FILM COATED ORAL at 20:40

## 2019-01-20 RX ADMIN — IPRATROPIUM BROMIDE AND ALBUTEROL SULFATE 1 AMPULE: .5; 3 SOLUTION RESPIRATORY (INHALATION) at 11:39

## 2019-01-20 RX ADMIN — AMLODIPINE BESYLATE 10 MG: 10 TABLET ORAL at 09:34

## 2019-01-20 RX ADMIN — IPRATROPIUM BROMIDE AND ALBUTEROL SULFATE 1 AMPULE: .5; 3 SOLUTION RESPIRATORY (INHALATION) at 07:13

## 2019-01-20 RX ADMIN — SPIRONOLACTONE 25 MG: 50 TABLET ORAL at 09:33

## 2019-01-20 RX ADMIN — ISOSORBIDE DINITRATE 5 MG: 10 TABLET ORAL at 09:34

## 2019-01-20 RX ADMIN — TIMOLOL MALEATE 1 DROP: 5 SOLUTION OPHTHALMIC at 20:41

## 2019-01-20 RX ADMIN — IPRATROPIUM BROMIDE AND ALBUTEROL SULFATE 1 AMPULE: .5; 3 SOLUTION RESPIRATORY (INHALATION) at 15:26

## 2019-01-20 RX ADMIN — CLOPIDOGREL BISULFATE 75 MG: 75 TABLET ORAL at 09:34

## 2019-01-20 RX ADMIN — MECLIZINE 12.5 MG: 12.5 TABLET ORAL at 20:41

## 2019-01-20 RX ADMIN — ISOSORBIDE DINITRATE 5 MG: 10 TABLET ORAL at 20:41

## 2019-01-20 RX ADMIN — INSULIN LISPRO 6 UNITS: 100 INJECTION, SOLUTION INTRAVENOUS; SUBCUTANEOUS at 18:51

## 2019-01-20 RX ADMIN — MECLIZINE 12.5 MG: 12.5 TABLET ORAL at 09:33

## 2019-01-20 RX ADMIN — INSULIN LISPRO 4 UNITS: 100 INJECTION, SOLUTION INTRAVENOUS; SUBCUTANEOUS at 18:50

## 2019-01-20 RX ADMIN — CARVEDILOL 3.12 MG: 3.12 TABLET, FILM COATED ORAL at 09:34

## 2019-01-20 RX ADMIN — APIXABAN 5 MG: 5 TABLET, FILM COATED ORAL at 09:35

## 2019-01-20 RX ADMIN — CEFDINIR 300 MG: 300 CAPSULE ORAL at 20:40

## 2019-01-20 RX ADMIN — APIXABAN 5 MG: 5 TABLET, FILM COATED ORAL at 20:40

## 2019-01-20 RX ADMIN — IPRATROPIUM BROMIDE AND ALBUTEROL SULFATE 1 AMPULE: .5; 3 SOLUTION RESPIRATORY (INHALATION) at 18:53

## 2019-01-20 RX ADMIN — INSULIN GLARGINE 40 UNITS: 100 INJECTION, SOLUTION SUBCUTANEOUS at 20:41

## 2019-01-20 RX ADMIN — TIMOLOL MALEATE 1 DROP: 5 SOLUTION OPHTHALMIC at 09:34

## 2019-01-21 VITALS
HEIGHT: 67 IN | DIASTOLIC BLOOD PRESSURE: 63 MMHG | TEMPERATURE: 97 F | HEART RATE: 63 BPM | WEIGHT: 220 LBS | OXYGEN SATURATION: 95 % | BODY MASS INDEX: 34.53 KG/M2 | SYSTOLIC BLOOD PRESSURE: 145 MMHG | RESPIRATION RATE: 20 BRPM

## 2019-01-21 PROBLEM — J96.01 ACUTE RESPIRATORY FAILURE WITH HYPOXEMIA (HCC): Status: RESOLVED | Noted: 2019-01-15 | Resolved: 2019-01-21

## 2019-01-21 PROBLEM — G93.41 ENCEPHALOPATHY, METABOLIC: Status: RESOLVED | Noted: 2019-01-15 | Resolved: 2019-01-21

## 2019-01-21 LAB
ANION GAP SERPL CALCULATED.3IONS-SCNC: 11 MMOL/L (ref 7–19)
BASOPHILS ABSOLUTE: 0 K/UL (ref 0–0.2)
BASOPHILS RELATIVE PERCENT: 0.3 % (ref 0–1)
BUN BLDV-MCNC: 42 MG/DL (ref 8–23)
CALCIUM SERPL-MCNC: 8.7 MG/DL (ref 8.2–9.6)
CHLORIDE BLD-SCNC: 105 MMOL/L (ref 98–111)
CO2: 24 MMOL/L (ref 22–29)
CREAT SERPL-MCNC: 1.6 MG/DL (ref 0.5–1.2)
EOSINOPHILS ABSOLUTE: 0.2 K/UL (ref 0–0.6)
EOSINOPHILS RELATIVE PERCENT: 2.1 % (ref 0–5)
GFR NON-AFRICAN AMERICAN: 40
GLUCOSE BLD-MCNC: 117 MG/DL (ref 70–99)
GLUCOSE BLD-MCNC: 145 MG/DL (ref 74–109)
GLUCOSE BLD-MCNC: 223 MG/DL (ref 70–99)
HCT VFR BLD CALC: 34 % (ref 42–52)
HEMOGLOBIN: 10.5 G/DL (ref 14–18)
LYMPHOCYTES ABSOLUTE: 2.8 K/UL (ref 1.1–4.5)
LYMPHOCYTES RELATIVE PERCENT: 25 % (ref 20–40)
MCH RBC QN AUTO: 30.3 PG (ref 27–31)
MCHC RBC AUTO-ENTMCNC: 30.9 G/DL (ref 33–37)
MCV RBC AUTO: 98 FL (ref 80–94)
MONOCYTES ABSOLUTE: 0.8 K/UL (ref 0–0.9)
MONOCYTES RELATIVE PERCENT: 6.7 % (ref 0–10)
NEUTROPHILS ABSOLUTE: 7.3 K/UL (ref 1.5–7.5)
NEUTROPHILS RELATIVE PERCENT: 65.6 % (ref 50–65)
PDW BLD-RTO: 14.1 % (ref 11.5–14.5)
PERFORMED ON: ABNORMAL
PERFORMED ON: ABNORMAL
PLATELET # BLD: 374 K/UL (ref 130–400)
PMV BLD AUTO: 10 FL (ref 9.4–12.4)
POTASSIUM SERPL-SCNC: 4.7 MMOL/L (ref 3.5–5)
RBC # BLD: 3.47 M/UL (ref 4.7–6.1)
SODIUM BLD-SCNC: 140 MMOL/L (ref 136–145)
WBC # BLD: 11.1 K/UL (ref 4.8–10.8)

## 2019-01-21 PROCEDURE — 94761 N-INVAS EAR/PLS OXIMETRY MLT: CPT

## 2019-01-21 PROCEDURE — 80048 BASIC METABOLIC PNL TOTAL CA: CPT

## 2019-01-21 PROCEDURE — 94640 AIRWAY INHALATION TREATMENT: CPT

## 2019-01-21 PROCEDURE — 97116 GAIT TRAINING THERAPY: CPT

## 2019-01-21 PROCEDURE — 82948 REAGENT STRIP/BLOOD GLUCOSE: CPT

## 2019-01-21 PROCEDURE — 36415 COLL VENOUS BLD VENIPUNCTURE: CPT

## 2019-01-21 PROCEDURE — 6370000000 HC RX 637 (ALT 250 FOR IP): Performed by: FAMILY MEDICINE

## 2019-01-21 PROCEDURE — 2700000000 HC OXYGEN THERAPY PER DAY

## 2019-01-21 PROCEDURE — 6370000000 HC RX 637 (ALT 250 FOR IP): Performed by: INTERNAL MEDICINE

## 2019-01-21 PROCEDURE — 6370000000 HC RX 637 (ALT 250 FOR IP): Performed by: EMERGENCY MEDICINE

## 2019-01-21 PROCEDURE — 85025 COMPLETE CBC W/AUTO DIFF WBC: CPT

## 2019-01-21 PROCEDURE — 99238 HOSP IP/OBS DSCHRG MGMT 30/<: CPT | Performed by: FAMILY MEDICINE

## 2019-01-21 PROCEDURE — 97530 THERAPEUTIC ACTIVITIES: CPT

## 2019-01-21 RX ORDER — LEVOFLOXACIN 750 MG/1
750 TABLET ORAL ONCE
Qty: 1 TABLET | Refills: 0 | Status: SHIPPED | OUTPATIENT
Start: 2019-01-21 | End: 2019-01-21

## 2019-01-21 RX ORDER — BENZONATATE 200 MG/1
200 CAPSULE ORAL 3 TIMES DAILY PRN
Qty: 30 CAPSULE | Refills: 0 | Status: SHIPPED | OUTPATIENT
Start: 2019-01-21 | End: 2019-01-31

## 2019-01-21 RX ORDER — BISMUTH TRIBROMOPH/PETROLATUM 5"X9"
1 BANDAGE TOPICAL EVERY OTHER DAY
Qty: 15 EACH | Refills: 0 | Status: SHIPPED | OUTPATIENT
Start: 2019-01-21

## 2019-01-21 RX ADMIN — CEFDINIR 300 MG: 300 CAPSULE ORAL at 09:41

## 2019-01-21 RX ADMIN — IPRATROPIUM BROMIDE AND ALBUTEROL SULFATE 1 AMPULE: .5; 3 SOLUTION RESPIRATORY (INHALATION) at 06:50

## 2019-01-21 RX ADMIN — INSULIN LISPRO 2 UNITS: 100 INJECTION, SOLUTION INTRAVENOUS; SUBCUTANEOUS at 13:16

## 2019-01-21 RX ADMIN — INSULIN LISPRO 6 UNITS: 100 INJECTION, SOLUTION INTRAVENOUS; SUBCUTANEOUS at 13:18

## 2019-01-21 RX ADMIN — ISOSORBIDE DINITRATE 5 MG: 10 TABLET ORAL at 09:42

## 2019-01-21 RX ADMIN — SPIRONOLACTONE 25 MG: 50 TABLET ORAL at 09:41

## 2019-01-21 RX ADMIN — MECLIZINE 12.5 MG: 12.5 TABLET ORAL at 09:42

## 2019-01-21 RX ADMIN — APIXABAN 5 MG: 5 TABLET, FILM COATED ORAL at 09:42

## 2019-01-21 RX ADMIN — AMLODIPINE BESYLATE 10 MG: 10 TABLET ORAL at 09:41

## 2019-01-21 RX ADMIN — CLOPIDOGREL BISULFATE 75 MG: 75 TABLET ORAL at 09:41

## 2019-01-21 RX ADMIN — FUROSEMIDE 40 MG: 40 TABLET ORAL at 09:42

## 2019-01-21 RX ADMIN — CARVEDILOL 3.12 MG: 3.12 TABLET, FILM COATED ORAL at 09:42

## 2019-01-21 RX ADMIN — TIMOLOL MALEATE 1 DROP: 5 SOLUTION OPHTHALMIC at 09:43

## 2019-01-21 RX ADMIN — IPRATROPIUM BROMIDE AND ALBUTEROL SULFATE 1 AMPULE: .5; 3 SOLUTION RESPIRATORY (INHALATION) at 10:45

## 2019-01-21 ASSESSMENT — PAIN SCALES - GENERAL: PAINLEVEL_OUTOF10: 0

## 2019-01-22 ENCOUNTER — TELEPHONE (OUTPATIENT)
Dept: INTERNAL MEDICINE | Age: 84
End: 2019-01-22

## 2019-01-22 ENCOUNTER — OFFICE VISIT (OUTPATIENT)
Dept: CARDIOLOGY | Age: 84
End: 2019-01-22
Payer: MEDICARE

## 2019-01-22 VITALS
BODY MASS INDEX: 34.53 KG/M2 | WEIGHT: 220 LBS | DIASTOLIC BLOOD PRESSURE: 66 MMHG | HEIGHT: 67 IN | SYSTOLIC BLOOD PRESSURE: 122 MMHG | HEART RATE: 65 BPM

## 2019-01-22 DIAGNOSIS — I10 ESSENTIAL HYPERTENSION: ICD-10-CM

## 2019-01-22 DIAGNOSIS — I44.2 COMPLETE HEART BLOCK (HCC): ICD-10-CM

## 2019-01-22 DIAGNOSIS — Z95.0 PACEMAKER: ICD-10-CM

## 2019-01-22 DIAGNOSIS — I50.32 CHRONIC DIASTOLIC CONGESTIVE HEART FAILURE (HCC): ICD-10-CM

## 2019-01-22 DIAGNOSIS — I25.10 CORONARY ARTERY DISEASE INVOLVING NATIVE CORONARY ARTERY OF NATIVE HEART WITHOUT ANGINA PECTORIS: Primary | ICD-10-CM

## 2019-01-22 PROCEDURE — 4040F PNEUMOC VAC/ADMIN/RCVD: CPT | Performed by: CLINICAL NURSE SPECIALIST

## 2019-01-22 PROCEDURE — G8427 DOCREV CUR MEDS BY ELIG CLIN: HCPCS | Performed by: CLINICAL NURSE SPECIALIST

## 2019-01-22 PROCEDURE — G8417 CALC BMI ABV UP PARAM F/U: HCPCS | Performed by: CLINICAL NURSE SPECIALIST

## 2019-01-22 PROCEDURE — 1036F TOBACCO NON-USER: CPT | Performed by: CLINICAL NURSE SPECIALIST

## 2019-01-22 PROCEDURE — G8598 ASA/ANTIPLAT THER USED: HCPCS | Performed by: CLINICAL NURSE SPECIALIST

## 2019-01-22 PROCEDURE — 93280 PM DEVICE PROGR EVAL DUAL: CPT | Performed by: CLINICAL NURSE SPECIALIST

## 2019-01-22 PROCEDURE — 1123F ACP DISCUSS/DSCN MKR DOCD: CPT | Performed by: CLINICAL NURSE SPECIALIST

## 2019-01-22 PROCEDURE — 1101F PT FALLS ASSESS-DOCD LE1/YR: CPT | Performed by: CLINICAL NURSE SPECIALIST

## 2019-01-22 PROCEDURE — 1111F DSCHRG MED/CURRENT MED MERGE: CPT | Performed by: CLINICAL NURSE SPECIALIST

## 2019-01-22 PROCEDURE — 99214 OFFICE O/P EST MOD 30 MIN: CPT | Performed by: CLINICAL NURSE SPECIALIST

## 2019-01-22 PROCEDURE — G8482 FLU IMMUNIZE ORDER/ADMIN: HCPCS | Performed by: CLINICAL NURSE SPECIALIST

## 2019-01-22 RX ORDER — ASPIRIN 81 MG/1
81 TABLET ORAL DAILY
Qty: 90 TABLET | Refills: 1
Start: 2019-01-22 | End: 2020-01-28

## 2019-01-22 ASSESSMENT — ENCOUNTER SYMPTOMS
ABDOMINAL PAIN: 0
EYE REDNESS: 0
FACIAL SWELLING: 0
VOMITING: 0
NAUSEA: 0
COUGH: 0
CHEST TIGHTNESS: 0
WHEEZING: 0
SHORTNESS OF BREATH: 0

## 2019-01-24 ENCOUNTER — HOSPITAL ENCOUNTER (OUTPATIENT)
Dept: WOUND CARE | Age: 84
Discharge: HOME | End: 2019-01-24
Payer: MEDICARE

## 2019-01-24 VITALS
TEMPERATURE: 97 F | DIASTOLIC BLOOD PRESSURE: 57 MMHG | WEIGHT: 220 LBS | SYSTOLIC BLOOD PRESSURE: 115 MMHG | BODY MASS INDEX: 34.53 KG/M2 | RESPIRATION RATE: 18 BRPM | HEART RATE: 66 BPM | HEIGHT: 67 IN

## 2019-01-24 DIAGNOSIS — L97.422 DIABETIC ULCER OF LEFT MIDFOOT ASSOCIATED WITH TYPE 2 DIABETES MELLITUS, WITH FAT LAYER EXPOSED (HCC): ICD-10-CM

## 2019-01-24 DIAGNOSIS — E11.621 DIABETIC ULCER OF LEFT MIDFOOT ASSOCIATED WITH TYPE 2 DIABETES MELLITUS, WITH FAT LAYER EXPOSED (HCC): ICD-10-CM

## 2019-01-24 PROCEDURE — 99212 OFFICE O/P EST SF 10 MIN: CPT

## 2019-01-24 PROCEDURE — 99213 OFFICE O/P EST LOW 20 MIN: CPT | Performed by: SURGERY

## 2019-01-24 ASSESSMENT — PAIN SCALES - GENERAL: PAINLEVEL_OUTOF10: 0

## 2019-01-30 ENCOUNTER — TELEPHONE (OUTPATIENT)
Dept: INTERNAL MEDICINE | Age: 84
End: 2019-01-30

## 2019-02-06 ENCOUNTER — TELEPHONE (OUTPATIENT)
Dept: INTERNAL MEDICINE CLINIC | Age: 84
End: 2019-02-06

## 2019-02-07 ENCOUNTER — TELEPHONE (OUTPATIENT)
Dept: INTERNAL MEDICINE | Age: 84
End: 2019-02-07

## 2019-02-12 ENCOUNTER — OFFICE VISIT (OUTPATIENT)
Dept: INTERNAL MEDICINE | Age: 84
End: 2019-02-12
Payer: MEDICARE

## 2019-02-12 ENCOUNTER — HOSPITAL ENCOUNTER (OUTPATIENT)
Dept: WOUND CARE | Age: 84
Discharge: HOME | End: 2019-02-12
Payer: MEDICARE

## 2019-02-12 VITALS
SYSTOLIC BLOOD PRESSURE: 126 MMHG | WEIGHT: 220 LBS | HEART RATE: 97 BPM | RESPIRATION RATE: 16 BRPM | DIASTOLIC BLOOD PRESSURE: 58 MMHG | TEMPERATURE: 98 F | HEIGHT: 67 IN | BODY MASS INDEX: 34.53 KG/M2

## 2019-02-12 VITALS
BODY MASS INDEX: 34.46 KG/M2 | OXYGEN SATURATION: 96 % | WEIGHT: 220 LBS | DIASTOLIC BLOOD PRESSURE: 60 MMHG | HEART RATE: 60 BPM | SYSTOLIC BLOOD PRESSURE: 122 MMHG

## 2019-02-12 DIAGNOSIS — Z79.4 TYPE 2 DIABETES MELLITUS WITH FOOT ULCER, WITH LONG-TERM CURRENT USE OF INSULIN (HCC): Primary | Chronic | ICD-10-CM

## 2019-02-12 DIAGNOSIS — I10 ESSENTIAL HYPERTENSION: ICD-10-CM

## 2019-02-12 DIAGNOSIS — L97.509 TYPE 2 DIABETES MELLITUS WITH FOOT ULCER, WITH LONG-TERM CURRENT USE OF INSULIN (HCC): Primary | Chronic | ICD-10-CM

## 2019-02-12 DIAGNOSIS — E11.621 DIABETIC ULCER OF LEFT MIDFOOT ASSOCIATED WITH TYPE 2 DIABETES MELLITUS, WITH FAT LAYER EXPOSED (HCC): ICD-10-CM

## 2019-02-12 DIAGNOSIS — M21.611 BUNION OF GREAT TOE OF RIGHT FOOT: Chronic | ICD-10-CM

## 2019-02-12 DIAGNOSIS — J18.9 PNEUMONIA DUE TO INFECTIOUS ORGANISM, UNSPECIFIED LATERALITY, UNSPECIFIED PART OF LUNG: ICD-10-CM

## 2019-02-12 DIAGNOSIS — I50.32 CHRONIC DIASTOLIC CONGESTIVE HEART FAILURE (HCC): ICD-10-CM

## 2019-02-12 DIAGNOSIS — E11.621 TYPE 2 DIABETES MELLITUS WITH FOOT ULCER, WITH LONG-TERM CURRENT USE OF INSULIN (HCC): Primary | Chronic | ICD-10-CM

## 2019-02-12 DIAGNOSIS — L97.422 DIABETIC ULCER OF LEFT MIDFOOT ASSOCIATED WITH TYPE 2 DIABETES MELLITUS, WITH FAT LAYER EXPOSED (HCC): ICD-10-CM

## 2019-02-12 PROCEDURE — 1111F DSCHRG MED/CURRENT MED MERGE: CPT | Performed by: INTERNAL MEDICINE

## 2019-02-12 PROCEDURE — 99496 TRANSJ CARE MGMT HIGH F2F 7D: CPT | Performed by: INTERNAL MEDICINE

## 2019-02-12 PROCEDURE — 99213 OFFICE O/P EST LOW 20 MIN: CPT | Performed by: SURGERY

## 2019-02-12 PROCEDURE — 99212 OFFICE O/P EST SF 10 MIN: CPT

## 2019-02-12 ASSESSMENT — ENCOUNTER SYMPTOMS
BLOOD IN STOOL: 0
BACK PAIN: 0
SINUS PRESSURE: 0
ABDOMINAL PAIN: 0
VOMITING: 0
NAUSEA: 0
TROUBLE SWALLOWING: 0
SHORTNESS OF BREATH: 0
SORE THROAT: 0
WHEEZING: 0
EYE DISCHARGE: 0
ABDOMINAL DISTENTION: 0
EYE ITCHING: 0

## 2019-02-12 ASSESSMENT — PAIN SCALES - GENERAL: PAINLEVEL_OUTOF10: 0

## 2019-02-13 ENCOUNTER — CARE COORDINATION (OUTPATIENT)
Dept: CARE COORDINATION | Age: 84
End: 2019-02-13

## 2019-02-14 ENCOUNTER — CARE COORDINATION (OUTPATIENT)
Dept: CARE COORDINATION | Age: 84
End: 2019-02-14

## 2019-02-22 RX ORDER — EZETIMIBE 10 MG/1
TABLET ORAL
Qty: 90 TABLET | Refills: 1 | Status: SHIPPED | OUTPATIENT
Start: 2019-02-22 | End: 2019-08-21 | Stop reason: SDUPTHER

## 2019-02-22 RX ORDER — CARVEDILOL 3.12 MG/1
TABLET ORAL
Qty: 180 TABLET | Refills: 1 | Status: SHIPPED | OUTPATIENT
Start: 2019-02-22 | End: 2019-08-31 | Stop reason: SDUPTHER

## 2019-02-26 ENCOUNTER — TELEPHONE (OUTPATIENT)
Dept: INTERNAL MEDICINE CLINIC | Age: 84
End: 2019-02-26

## 2019-02-26 ENCOUNTER — CARE COORDINATION (OUTPATIENT)
Dept: CARE COORDINATION | Age: 84
End: 2019-02-26

## 2019-02-26 ENCOUNTER — OFFICE VISIT (OUTPATIENT)
Dept: INTERNAL MEDICINE | Age: 84
End: 2019-02-26
Payer: MEDICARE

## 2019-02-26 VITALS
BODY MASS INDEX: 34.3 KG/M2 | SYSTOLIC BLOOD PRESSURE: 126 MMHG | DIASTOLIC BLOOD PRESSURE: 74 MMHG | HEART RATE: 68 BPM | WEIGHT: 219 LBS | OXYGEN SATURATION: 98 %

## 2019-02-26 DIAGNOSIS — Z79.4 TYPE 2 DIABETES MELLITUS WITH FOOT ULCER, WITH LONG-TERM CURRENT USE OF INSULIN (HCC): Primary | Chronic | ICD-10-CM

## 2019-02-26 DIAGNOSIS — E11.621 TYPE 2 DIABETES MELLITUS WITH FOOT ULCER, WITH LONG-TERM CURRENT USE OF INSULIN (HCC): Primary | Chronic | ICD-10-CM

## 2019-02-26 DIAGNOSIS — I50.32 CHRONIC DIASTOLIC CONGESTIVE HEART FAILURE (HCC): ICD-10-CM

## 2019-02-26 DIAGNOSIS — I10 ESSENTIAL HYPERTENSION: ICD-10-CM

## 2019-02-26 DIAGNOSIS — L97.509 TYPE 2 DIABETES MELLITUS WITH FOOT ULCER, WITH LONG-TERM CURRENT USE OF INSULIN (HCC): Primary | Chronic | ICD-10-CM

## 2019-02-26 DIAGNOSIS — R53.82 CHRONIC FATIGUE: ICD-10-CM

## 2019-02-26 PROCEDURE — G8598 ASA/ANTIPLAT THER USED: HCPCS | Performed by: INTERNAL MEDICINE

## 2019-02-26 PROCEDURE — 1123F ACP DISCUSS/DSCN MKR DOCD: CPT | Performed by: INTERNAL MEDICINE

## 2019-02-26 PROCEDURE — 1101F PT FALLS ASSESS-DOCD LE1/YR: CPT | Performed by: INTERNAL MEDICINE

## 2019-02-26 PROCEDURE — 4040F PNEUMOC VAC/ADMIN/RCVD: CPT | Performed by: INTERNAL MEDICINE

## 2019-02-26 PROCEDURE — G8417 CALC BMI ABV UP PARAM F/U: HCPCS | Performed by: INTERNAL MEDICINE

## 2019-02-26 PROCEDURE — G8427 DOCREV CUR MEDS BY ELIG CLIN: HCPCS | Performed by: INTERNAL MEDICINE

## 2019-02-26 PROCEDURE — 99214 OFFICE O/P EST MOD 30 MIN: CPT | Performed by: INTERNAL MEDICINE

## 2019-02-26 PROCEDURE — 1036F TOBACCO NON-USER: CPT | Performed by: INTERNAL MEDICINE

## 2019-02-26 PROCEDURE — G8482 FLU IMMUNIZE ORDER/ADMIN: HCPCS | Performed by: INTERNAL MEDICINE

## 2019-02-26 ASSESSMENT — ENCOUNTER SYMPTOMS
WHEEZING: 0
EYE DISCHARGE: 0
BLOOD IN STOOL: 0
ABDOMINAL PAIN: 0
EYE ITCHING: 0
VOMITING: 0
BACK PAIN: 0
SINUS PRESSURE: 0
ABDOMINAL DISTENTION: 0
TROUBLE SWALLOWING: 0
SHORTNESS OF BREATH: 0
NAUSEA: 0
SORE THROAT: 0

## 2019-02-26 ASSESSMENT — PATIENT HEALTH QUESTIONNAIRE - PHQ9
1. LITTLE INTEREST OR PLEASURE IN DOING THINGS: 0
2. FEELING DOWN, DEPRESSED OR HOPELESS: 0
SUM OF ALL RESPONSES TO PHQ QUESTIONS 1-9: 0
SUM OF ALL RESPONSES TO PHQ9 QUESTIONS 1 & 2: 0
SUM OF ALL RESPONSES TO PHQ QUESTIONS 1-9: 0

## 2019-03-08 ENCOUNTER — TELEPHONE (OUTPATIENT)
Dept: INTERNAL MEDICINE | Age: 84
End: 2019-03-08

## 2019-03-08 ENCOUNTER — CARE COORDINATION (OUTPATIENT)
Dept: CARE COORDINATION | Age: 84
End: 2019-03-08

## 2019-03-19 RX ORDER — FUROSEMIDE 40 MG/1
TABLET ORAL
Qty: 90 TABLET | Refills: 1 | Status: SHIPPED | OUTPATIENT
Start: 2019-03-19 | End: 2019-09-20 | Stop reason: SDUPTHER

## 2019-03-19 RX ORDER — ATORVASTATIN CALCIUM 10 MG/1
TABLET, FILM COATED ORAL
Qty: 90 TABLET | Refills: 1 | Status: SHIPPED | OUTPATIENT
Start: 2019-03-19 | End: 2019-09-15 | Stop reason: SDUPTHER

## 2019-03-29 ENCOUNTER — TELEPHONE (OUTPATIENT)
Dept: INTERNAL MEDICINE CLINIC | Age: 84
End: 2019-03-29

## 2019-04-02 RX ORDER — AMLODIPINE BESYLATE 5 MG/1
TABLET ORAL
Qty: 90 TABLET | Refills: 1 | Status: SHIPPED | OUTPATIENT
Start: 2019-04-02 | End: 2019-09-30 | Stop reason: SDUPTHER

## 2019-04-02 NOTE — TELEPHONE ENCOUNTER
Nora Mercado called requesting a refill of the below medication which has been pended for you:     Requested Prescriptions     Pending Prescriptions Disp Refills    amLODIPine (NORVASC) 5 MG tablet [Pharmacy Med Name: AMLODIPINE BESYLATE TABS 5MG] 90 tablet 1     Sig: TAKE 1 TABLET DAILY (MUST KEEP FOLLOW UP FOR ADDITIONAL REFILLS)       Last Appointment Date: 2/26/2019  Next Appointment Date: 5/30/2019    No Known Allergies

## 2019-04-23 ENCOUNTER — TELEPHONE (OUTPATIENT)
Dept: CARDIOLOGY | Age: 84
End: 2019-04-23

## 2019-04-23 DIAGNOSIS — Z95.0 PACEMAKER: Primary | ICD-10-CM

## 2019-04-23 DIAGNOSIS — I49.5 SICK SINUS SYNDROME (HCC): ICD-10-CM

## 2019-04-23 PROCEDURE — 93296 REM INTERROG EVL PM/IDS: CPT | Performed by: CLINICAL NURSE SPECIALIST

## 2019-04-23 PROCEDURE — 93294 REM INTERROG EVL PM/LDLS PM: CPT | Performed by: CLINICAL NURSE SPECIALIST

## 2019-05-01 ENCOUNTER — TELEPHONE (OUTPATIENT)
Dept: INTERNAL MEDICINE CLINIC | Age: 84
End: 2019-05-01

## 2019-05-01 NOTE — TELEPHONE ENCOUNTER
[facility] for referral for nursing - per  Donato request  \" pt has very irritated area under breast area starting to open. Concerned abuot this skin issue due to him being a diabetic. Area also has an odor  \"    Is is okay for [facility] nurse to eval that  ?

## 2019-05-02 ENCOUNTER — TELEPHONE (OUTPATIENT)
Dept: INTERNAL MEDICINE CLINIC | Age: 84
End: 2019-05-02

## 2019-05-02 NOTE — TELEPHONE ENCOUNTER
Since pt has PCM /DOL the pt son is having that nurse address the wound care and [facility] will not be admitting at this time   [facility] does not take DOL pts - but PCM since he has [facility] nurse they should be able to take care of it anyway - they would be self pay for any [facility] with us and if they decide they want that at some point they will let us know   For now [facility] will take care of this pt

## 2019-05-03 ENCOUNTER — TELEPHONE (OUTPATIENT)
Dept: INTERNAL MEDICINE | Age: 84
End: 2019-05-03

## 2019-05-03 RX ORDER — NYSTATIN 100000 U/G
CREAM TOPICAL
Qty: 1 TUBE | Refills: 5 | Status: SHIPPED | OUTPATIENT
Start: 2019-05-03 | End: 2019-12-09 | Stop reason: SDUPTHER

## 2019-05-23 ENCOUNTER — TELEPHONE (OUTPATIENT)
Dept: INTERNAL MEDICINE | Age: 84
End: 2019-05-23

## 2019-05-23 DIAGNOSIS — Z79.4 TYPE 2 DIABETES MELLITUS WITH FOOT ULCER, WITH LONG-TERM CURRENT USE OF INSULIN (HCC): Chronic | ICD-10-CM

## 2019-05-23 DIAGNOSIS — L97.509 TYPE 2 DIABETES MELLITUS WITH FOOT ULCER, WITH LONG-TERM CURRENT USE OF INSULIN (HCC): Chronic | ICD-10-CM

## 2019-05-23 DIAGNOSIS — E11.621 TYPE 2 DIABETES MELLITUS WITH FOOT ULCER, WITH LONG-TERM CURRENT USE OF INSULIN (HCC): Primary | Chronic | ICD-10-CM

## 2019-05-23 DIAGNOSIS — E78.00 PURE HYPERCHOLESTEROLEMIA: ICD-10-CM

## 2019-05-23 DIAGNOSIS — E11.621 TYPE 2 DIABETES MELLITUS WITH FOOT ULCER, WITH LONG-TERM CURRENT USE OF INSULIN (HCC): Chronic | ICD-10-CM

## 2019-05-23 DIAGNOSIS — Z12.5 SCREENING PSA (PROSTATE SPECIFIC ANTIGEN): ICD-10-CM

## 2019-05-23 DIAGNOSIS — L97.509 TYPE 2 DIABETES MELLITUS WITH FOOT ULCER, WITH LONG-TERM CURRENT USE OF INSULIN (HCC): Primary | Chronic | ICD-10-CM

## 2019-05-23 DIAGNOSIS — Z79.4 TYPE 2 DIABETES MELLITUS WITH FOOT ULCER, WITH LONG-TERM CURRENT USE OF INSULIN (HCC): Primary | Chronic | ICD-10-CM

## 2019-05-23 LAB
ALBUMIN SERPL-MCNC: 3.9 G/DL (ref 3.5–5.2)
ALP BLD-CCNC: 83 U/L (ref 40–130)
ALT SERPL-CCNC: 9 U/L (ref 5–41)
ANION GAP SERPL CALCULATED.3IONS-SCNC: 17 MMOL/L (ref 7–19)
AST SERPL-CCNC: 12 U/L (ref 5–40)
BASOPHILS ABSOLUTE: 0 K/UL (ref 0–0.2)
BASOPHILS RELATIVE PERCENT: 0.4 % (ref 0–1)
BILIRUB SERPL-MCNC: 0.4 MG/DL (ref 0.2–1.2)
BUN BLDV-MCNC: 49 MG/DL (ref 8–23)
CALCIUM SERPL-MCNC: 9.2 MG/DL (ref 8.2–9.6)
CHLORIDE BLD-SCNC: 103 MMOL/L (ref 98–111)
CHOLESTEROL, TOTAL: 139 MG/DL (ref 160–199)
CO2: 20 MMOL/L (ref 22–29)
CREAT SERPL-MCNC: 1.6 MG/DL (ref 0.5–1.2)
EOSINOPHILS ABSOLUTE: 0.5 K/UL (ref 0–0.6)
EOSINOPHILS RELATIVE PERCENT: 4.2 % (ref 0–5)
GFR NON-AFRICAN AMERICAN: 40
GLUCOSE BLD-MCNC: 267 MG/DL (ref 74–109)
HBA1C MFR BLD: 11.2 % (ref 4–6)
HCT VFR BLD CALC: 36.5 % (ref 42–52)
HDLC SERPL-MCNC: 33 MG/DL (ref 55–121)
HEMOGLOBIN: 12 G/DL (ref 14–18)
LDL CHOLESTEROL CALCULATED: 84 MG/DL
LYMPHOCYTES ABSOLUTE: 3.7 K/UL (ref 1.1–4.5)
LYMPHOCYTES RELATIVE PERCENT: 33.2 % (ref 20–40)
MCH RBC QN AUTO: 31 PG (ref 27–31)
MCHC RBC AUTO-ENTMCNC: 32.9 G/DL (ref 33–37)
MCV RBC AUTO: 94.3 FL (ref 80–94)
MONOCYTES ABSOLUTE: 0.8 K/UL (ref 0–0.9)
MONOCYTES RELATIVE PERCENT: 7.3 % (ref 0–10)
NEUTROPHILS ABSOLUTE: 6 K/UL (ref 1.5–7.5)
NEUTROPHILS RELATIVE PERCENT: 54.5 % (ref 50–65)
PDW BLD-RTO: 13.2 % (ref 11.5–14.5)
PLATELET # BLD: 221 K/UL (ref 130–400)
PMV BLD AUTO: 11.7 FL (ref 9.4–12.4)
POTASSIUM SERPL-SCNC: 5 MMOL/L (ref 3.5–5)
PROSTATE SPECIFIC ANTIGEN: 1.06 NG/ML (ref 0–4)
RBC # BLD: 3.87 M/UL (ref 4.7–6.1)
SODIUM BLD-SCNC: 140 MMOL/L (ref 136–145)
TOTAL PROTEIN: 7.8 G/DL (ref 6.6–8.7)
TRIGL SERPL-MCNC: 112 MG/DL (ref 0–149)
WBC # BLD: 11 K/UL (ref 4.8–10.8)

## 2019-05-30 ENCOUNTER — OFFICE VISIT (OUTPATIENT)
Dept: INTERNAL MEDICINE | Age: 84
End: 2019-05-30
Payer: MEDICARE

## 2019-05-30 VITALS
SYSTOLIC BLOOD PRESSURE: 138 MMHG | WEIGHT: 225 LBS | BODY MASS INDEX: 35.24 KG/M2 | OXYGEN SATURATION: 98 % | DIASTOLIC BLOOD PRESSURE: 70 MMHG

## 2019-05-30 DIAGNOSIS — E78.00 PURE HYPERCHOLESTEROLEMIA: ICD-10-CM

## 2019-05-30 DIAGNOSIS — Z79.4 TYPE 2 DIABETES MELLITUS WITH FOOT ULCER, WITH LONG-TERM CURRENT USE OF INSULIN (HCC): Primary | Chronic | ICD-10-CM

## 2019-05-30 DIAGNOSIS — N18.30 CKD (CHRONIC KIDNEY DISEASE) STAGE 3, GFR 30-59 ML/MIN (HCC): ICD-10-CM

## 2019-05-30 DIAGNOSIS — E11.621 TYPE 2 DIABETES MELLITUS WITH FOOT ULCER, WITH LONG-TERM CURRENT USE OF INSULIN (HCC): Primary | Chronic | ICD-10-CM

## 2019-05-30 DIAGNOSIS — I10 ESSENTIAL HYPERTENSION: ICD-10-CM

## 2019-05-30 DIAGNOSIS — L97.509 TYPE 2 DIABETES MELLITUS WITH FOOT ULCER, WITH LONG-TERM CURRENT USE OF INSULIN (HCC): Primary | Chronic | ICD-10-CM

## 2019-05-30 PROCEDURE — 1123F ACP DISCUSS/DSCN MKR DOCD: CPT | Performed by: INTERNAL MEDICINE

## 2019-05-30 PROCEDURE — G8598 ASA/ANTIPLAT THER USED: HCPCS | Performed by: INTERNAL MEDICINE

## 2019-05-30 PROCEDURE — 4040F PNEUMOC VAC/ADMIN/RCVD: CPT | Performed by: INTERNAL MEDICINE

## 2019-05-30 PROCEDURE — G8427 DOCREV CUR MEDS BY ELIG CLIN: HCPCS | Performed by: INTERNAL MEDICINE

## 2019-05-30 PROCEDURE — G8417 CALC BMI ABV UP PARAM F/U: HCPCS | Performed by: INTERNAL MEDICINE

## 2019-05-30 PROCEDURE — 1036F TOBACCO NON-USER: CPT | Performed by: INTERNAL MEDICINE

## 2019-05-30 PROCEDURE — 99214 OFFICE O/P EST MOD 30 MIN: CPT | Performed by: INTERNAL MEDICINE

## 2019-05-30 ASSESSMENT — ENCOUNTER SYMPTOMS
EYE ITCHING: 0
TROUBLE SWALLOWING: 0
SINUS PRESSURE: 0
WHEEZING: 0
SHORTNESS OF BREATH: 0
EYE DISCHARGE: 0
SORE THROAT: 0
VOMITING: 0
BLOOD IN STOOL: 0
ABDOMINAL DISTENTION: 0
BACK PAIN: 0
NAUSEA: 0
ABDOMINAL PAIN: 0

## 2019-05-30 NOTE — LETTER
May 30, 2019      To Whom It May Concern,    Mr. Hanane Up would benefit from have a nurse with him during all meals to help select the right choice of foods and also to help with him bath daily.       Sincerely,        Osman Boone MD

## 2019-05-30 NOTE — PROGRESS NOTES
[facility]  [facility] [address]  Dept: [phone]  Dept Fax: [phone]: [phone]      Visit Date: 5/30/2019    Wing Chance parrish 80 y.o. male who presents today for:  Chief Complaint   Patient presents with    3 Month Follow-Up    Diabetes         HPI:     Jeb Nava is 80years old in for 3 month follow-up visit. He has diabetes as well as renal insufficiency. He's also got the history of congestive heart failure. He has urinary incontinence and is having lots of problems with yeast and skin issues. He's also having some problems with cleanliness. [facility] living facility has called with concerns about his body odor and the fact that other residents are complaining about his cleanliness. Social History     Tobacco Use    Smoking status: Former Smoker    Smokeless tobacco: Never Used   Substance Use Topics    Alcohol use: No      Current Outpatient Medications   Medication Sig Dispense Refill    nystatin (MYCOSTATIN) 968036 UNIT/GM cream Apply topically 2 times daily.  1 Tube 5    amLODIPine (NORVASC) 5 MG tablet TAKE 1 TABLET DAILY (MUST KEEP FOLLOW UP FOR ADDITIONAL REFILLS) 90 tablet 1    furosemide (LASIX) 40 MG tablet TAKE 1 TABLET DAILY 90 tablet 1    atorvastatin (LIPITOR) 10 MG tablet TAKE 1 TABLET DAILY AT BEDTIME 90 tablet 1    carvedilol (COREG) 3.125 MG tablet TAKE 1 TABLET TWICE A  tablet 1    ezetimibe (ZETIA) 10 MG tablet TAKE 1 TABLET DAILY 90 tablet 1    apixaban (ELIQUIS) 2.5 MG TABS tablet Take 1 tablet by mouth 2 times daily 180 tablet 3    aspirin EC 81 MG EC tablet Take 1 tablet by mouth daily 90 tablet 1    Bismuth Tribromoph-Petrolatum (XEROFORM PETROLATUM GAUZE 5\"X9\") MISC external pads Apply 1 each topically every other day 15 each 0    albuterol sulfate HFA (PROVENTIL HFA) 108 (90 Base) MCG/ACT inhaler Inhale 2 puffs into the lungs every 6 hours as needed for Wheezing 1 joint swelling. Skin: Positive for rash. Negative for wound. Allergic/Immunologic: Negative for environmental allergies and food allergies. Neurological: Positive for weakness. Negative for dizziness, tremors, syncope, numbness and headaches. Hematological: Negative for adenopathy. Psychiatric/Behavioral: Negative for agitation and hallucinations. The patient is not nervous/anxious. Objective:     /70   Wt 225 lb (102.1 kg)   SpO2 98%   BMI 35.24 kg/m²   Physical Exam   Constitutional: He is oriented to person, place, and time. He appears well-developed and well-nourished. No distress. HENT:   Head: Normocephalic and atraumatic. Right Ear: External ear normal.   Left Ear: External ear normal.   Nose: Nose normal.   Mouth/Throat: Oropharynx is clear and moist. No oropharyngeal exudate. Eyes: Pupils are equal, round, and reactive to light. Conjunctivae and EOM are normal. Right eye exhibits no discharge. Left eye exhibits no discharge. No scleral icterus. Neck: Normal range of motion. Neck supple. No JVD present. No tracheal deviation present. No thyromegaly present. Cardiovascular: Normal rate, regular rhythm, normal heart sounds and intact distal pulses. Exam reveals no gallop and no friction rub. No murmur heard. Pulmonary/Chest: Effort normal and breath sounds normal. No respiratory distress. He has no wheezes. He has no rales. Abdominal: Soft. Bowel sounds are normal. He exhibits no distension and no mass. There is no tenderness. There is no rebound and no guarding. Musculoskeletal: Normal range of motion. He exhibits no edema, tenderness or deformity. Lymphadenopathy:     He has no cervical adenopathy. Neurological: He is alert and oriented to person, place, and time. He has normal reflexes. He displays normal reflexes. No cranial nerve deficit. He exhibits normal muscle tone. Coordination normal.   Skin: Skin is warm and dry. No rash noted. He is not diaphoretic. No erythema. No pallor. Psychiatric: He has a normal mood and affect. His behavior is normal. Judgment and thought content normal.        Assessment:      Diagnosis Orders   1. 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, with long-term current use of insulin ([facility])  Comprehensive Metabolic Panel    Hemoglobin A1C    Lipid Panel   2. Pure hypercholesterolemia  Comprehensive Metabolic Panel    Hemoglobin A1C    Lipid Panel   3. CKD (chronic kidney disease) stage 3, GFR 30-59 ml/min ([facility])  Comprehensive Metabolic Panel    Hemoglobin A1C    Lipid Panel   4. Essential hypertension  Comprehensive Metabolic Panel    Hemoglobin A1C    Lipid Panel            Plan:     Type II diabetes mellitus which is poorly controlled. His hemoglobin A1c is 11.2. He's not making wise choices when he goes to the dining room at the assisted living facility. Hypercholesterol. The same goes hears numbers look terrible and he's not making last choices as far as food choices. Chronic kidney disease. His creatinines climbing. His GFR is gone from 44 down to 40 and this is all probably due to his poorly controlled diabetes. Essential hypertension which is adequately controlled on the medications    ad a long discussion with he and his son today. Covered by the [employer] for nursing care. I told him that we need the nurse out there daily take for him to be base. The body odor is terrible and I told him that the amount of wetness that he has during due to his urinary incontinence and his poorly controlled diabetes and I'm concerned about potential skin breakdown. The [employer] will cover nursing for him. We are going to send a letter to them stating that he needs nursing daily with bathing and them assisting him in his medial choices on a daily basis because he is noncompliant and doesn't appear to understand what he needs to eat to keep his diabetes under control.  We will send a letter to the [facility] for this to take place. I'm concerned if this doesn't take place he does not want to be able to continue to stay in the environment where he is living. Have spent 25 minutes with patient today, 50% of the time is been spent counseling on cardiovascular risk factors, fall risk precautions, and immunizations,          Return in about 3 months (around 8/30/2019) for blood pressure check, liver ,lipid check, diabetes check, LAB 1 WEEK BEFORE APPOINTMENT. Patient given educational materials- see patient instructions. Discussed use, benefit, and side effects of prescribedmedications. All patient questions answered. Pt voiced understanding. Reviewedhealth maintenance. Instructed to continue current medications, diet and exercise. Patient agreed with treatment plan. **This report has been created usingvoice recognition software. It may contain minor errors which are inherent in voicerecognition technology. **    Electronically signed by Ed Mcmillan MD on 5/30/2019 at 10:41 AM

## 2019-06-03 ENCOUNTER — CARE COORDINATION (OUTPATIENT)
Dept: CARE COORDINATION | Age: 84
End: 2019-06-03

## 2019-06-03 NOTE — CARE COORDINATION
Ambulatory Care Coordination Note  6/3/2019  CM Risk Score: 11  Surinder Mortality Risk Score: 79    ACC: Faisal Vela, RN    Summary Note:  Called son to see if there had been any progress with [employer] (DOL[employer]E) coming out to do nursing care. Son spoke with Dora Clark at [facility]. They stated that it would be a month or two before they could get the nursing care to come out. They can set up a CNA that can come out 5 days a week for about 8 hrs a day. I recommended bed baths a couple of times a week in addition to his 2 showers a week. Son will talk with aides and pt about this. Another problem is his food choices. He isn't compliant with a DM diet. He is also having yeast infections due to high sugar. Pt has been refusing baths most of the week. He only bathes 2 times a week. Son is going to help him pick out his meals. He is going to ask the dietician at the assisted living facility to give him a print out of the planned meals. He is due to see the Kidney specialist this Wednesday. Will f/u in a couple of weeks to see if the DOL/E has gotten anything accomplished. Son stated understanding. Care Coordination Interventions    Program Enrollment:  Complex Care  Referral from Primary Care Provider:  No  Suggested Interventions and Community Resources  Diabetes Education: In Process  Fall Risk Prevention:   In Process  Physical Therapy:  Completed (Comment: Has [facility] now. )  Zone Management Tools:  Completed (Comment: DM, CHF)         Future Appointments   Date Time Provider Janes Littlejohn   7/23/2019  1:15 PM SHRUTI Glover [facility]   8/30/2019  9:30 AM MD RONNIE Gard[facility]

## 2019-07-23 ENCOUNTER — OFFICE VISIT (OUTPATIENT)
Dept: CARDIOLOGY | Age: 84
End: 2019-07-23
Payer: MEDICARE

## 2019-07-23 VITALS
DIASTOLIC BLOOD PRESSURE: 76 MMHG | HEART RATE: 68 BPM | WEIGHT: 233 LBS | HEIGHT: 67 IN | BODY MASS INDEX: 36.57 KG/M2 | SYSTOLIC BLOOD PRESSURE: 138 MMHG

## 2019-07-23 DIAGNOSIS — I50.32 CHRONIC DIASTOLIC CONGESTIVE HEART FAILURE (HCC): ICD-10-CM

## 2019-07-23 DIAGNOSIS — I25.10 CORONARY ARTERY DISEASE INVOLVING NATIVE CORONARY ARTERY OF NATIVE HEART WITHOUT ANGINA PECTORIS: Primary | ICD-10-CM

## 2019-07-23 DIAGNOSIS — I10 ESSENTIAL HYPERTENSION: ICD-10-CM

## 2019-07-23 DIAGNOSIS — Z95.0 PACEMAKER: ICD-10-CM

## 2019-07-23 PROCEDURE — G8427 DOCREV CUR MEDS BY ELIG CLIN: HCPCS | Performed by: CLINICAL NURSE SPECIALIST

## 2019-07-23 PROCEDURE — 4040F PNEUMOC VAC/ADMIN/RCVD: CPT | Performed by: CLINICAL NURSE SPECIALIST

## 2019-07-23 PROCEDURE — 99214 OFFICE O/P EST MOD 30 MIN: CPT | Performed by: CLINICAL NURSE SPECIALIST

## 2019-07-23 PROCEDURE — 93280 PM DEVICE PROGR EVAL DUAL: CPT | Performed by: CLINICAL NURSE SPECIALIST

## 2019-07-23 PROCEDURE — G8417 CALC BMI ABV UP PARAM F/U: HCPCS | Performed by: CLINICAL NURSE SPECIALIST

## 2019-07-23 PROCEDURE — G8598 ASA/ANTIPLAT THER USED: HCPCS | Performed by: CLINICAL NURSE SPECIALIST

## 2019-07-23 PROCEDURE — 1123F ACP DISCUSS/DSCN MKR DOCD: CPT | Performed by: CLINICAL NURSE SPECIALIST

## 2019-07-23 PROCEDURE — 1036F TOBACCO NON-USER: CPT | Performed by: CLINICAL NURSE SPECIALIST

## 2019-07-23 ASSESSMENT — ENCOUNTER SYMPTOMS
VOMITING: 0
NAUSEA: 0
ABDOMINAL PAIN: 0
EYE REDNESS: 0
FACIAL SWELLING: 0
COUGH: 0
WHEEZING: 0
SHORTNESS OF BREATH: 0
CHEST TIGHTNESS: 0

## 2019-07-23 NOTE — PROGRESS NOTES
[facility], [address] [address]  Phone: [phone]  Fax: [phone]    OFFICE VISIT:  2019    Franc Boland - : 1925    Reason For Visit:  Jacquie Lehman is a 80 y.o. male who is here for Coronary Artery Disease (No cardiac symptoms today.) and Ambulatory Cardiac Monitoring    HPI   The patient is here follow-up with a history of CAD, diastolic heart failure, and a pacemaker. [facility] Gained about 7 pounds over the last month and is noticing an increase in lower extremity edema. He reports the left leg is always larger than the right. He denies any chest pain, palpitations, orthopnea, PND. He denies chest pain, unusual dyspnea, orthopnea, PND, or palpitations     Amalia Kathleen MD is PCP.   Franc Boland has the following history as recorded in [facility]:    Patient Active Problem List    Diagnosis Date Noted    Bunion of great toe of right foot 2019    Pneumonia 2019    Seizures ([address]     Hypertension     Diastolic heart failure ([address]     Diabetes mellitus ([address]     CAD (coronary artery disease)     Diabetic ulcer of left midfoot associated with type 2 diabetes mellitus, with fat layer exposed ([address] 2018    Palliative care patient 2018    Diabetic foot infection ([address] 10/31/2018    Chronic fatigue 10/18/2018    History of prostate cancer 08/15/2018    Male stress incontinence 08/15/2018    Sick sinus syndrome ([address]     Pacemaker     Mobitz type 2 second degree heart block     Chronic diastolic congestive heart failure ([address] 05/10/2016    Obesity due to excess calories 05/10/2016    Coronary artery disease involving native coronary artery without angina pectoris 05/10/2016    Complete heart block ([address] 05/10/2016    Glaucoma 05/10/2016    Pure hypercholesterolemia 05/10/2016    Benign prostatic hyperplasia 05/10/2016    CKD (chronic kidney

## 2019-07-23 NOTE — PATIENT INSTRUCTIONS
serving. · Food labels also tell you the Percent Daily Value. If the Percent Daily Value says 50%, it means that you will get at least 50% of all the sodium you need for the entire day in one serving. Choose products with low Percent Daily Values for sodium. · Be aware that sodium can come in forms other than salt, including monosodium glutamate (MSG), sodium citrate, and sodium bicarbonate (baking soda). MSG is often added to Asian food. You can sometimes ask for food without MSG or salt. Buy low-sodium foods  · Buy foods that are labeled \"unsalted\" (no salt added), \"sodium-free\" (less than 5 mg of sodium per serving), or \"low-sodium\" (less than 140 mg of sodium per serving). A food labeled \"light sodium\" has less than half of the full-sodium version of that food. Foods labeled \"reduced-sodium\" may still have too much sodium. · Buy fresh vegetables or plain, frozen vegetables. Buy low-sodium versions of canned vegetables, soups, and other canned goods. Prepare low-sodium meals  · Use less salt each day when cooking. Reducing salt in this way will help you adjust to the taste. Do not add salt after cooking. Take the salt shaker off the table. · Flavor your food with garlic, lemon juice, onion, vinegar, herbs, and spices instead of salt. Do not use soy sauce, steak sauce, onion salt, garlic salt, mustard, or ketchup on your food. · Make your own salad dressings, sauces, and ketchup without adding salt. · Use less salt (or none) when recipes call for it. You can often use half the salt a recipe calls for without losing flavor. Other dishes like rice, pasta, and grains do not need added salt. · Rinse canned vegetables. This removes some--but not all--of the salt. · Avoid water that has a naturally high sodium content or that has been treated with water softeners, which add sodium. Call your local water company to find out the sodium content of your water supply.  If you buy bottled water, read the label and

## 2019-08-02 RX ORDER — ISOSORBIDE DINITRATE 5 MG/1
TABLET ORAL
Qty: 540 TABLET | Refills: 4 | Status: SHIPPED | OUTPATIENT
Start: 2019-08-02 | End: 2020-01-01

## 2019-08-14 RX ORDER — SPIRONOLACTONE 25 MG/1
TABLET ORAL
Qty: 90 TABLET | Refills: 3 | Status: SHIPPED | OUTPATIENT
Start: 2019-08-14 | End: 2020-01-01

## 2019-08-14 NOTE — TELEPHONE ENCOUNTER
Shelley Singh called requesting a refill of the below medication which has been pended for you:     Requested Prescriptions     Pending Prescriptions Disp Refills    spironolactone (ALDACTONE) 25 MG tablet [Pharmacy Med Name: SPIRONOLACTONE TABS 25MG] 90 tablet 3     Sig: TAKE 1 TABLET DAILY       Last Appointment Date: 5/30/2019  Next Appointment Date: 8/30/2019    No Known Allergies

## 2019-08-15 RX ORDER — POLYETHYLENE GLYCOL 3350 17 G/17G
POWDER, FOR SOLUTION ORAL
Qty: 527 G | Refills: 0 | Status: SHIPPED | OUTPATIENT
Start: 2019-08-15 | End: 2020-01-28 | Stop reason: ALTCHOICE

## 2019-08-21 RX ORDER — EZETIMIBE 10 MG/1
TABLET ORAL
Qty: 90 TABLET | Refills: 4 | Status: SHIPPED | OUTPATIENT
Start: 2019-08-21 | End: 2020-01-01

## 2019-08-23 DIAGNOSIS — Z79.4 TYPE 2 DIABETES MELLITUS WITH FOOT ULCER, WITH LONG-TERM CURRENT USE OF INSULIN (HCC): Chronic | ICD-10-CM

## 2019-08-23 DIAGNOSIS — N18.30 CKD (CHRONIC KIDNEY DISEASE) STAGE 3, GFR 30-59 ML/MIN (HCC): ICD-10-CM

## 2019-08-23 DIAGNOSIS — E78.00 PURE HYPERCHOLESTEROLEMIA: ICD-10-CM

## 2019-08-23 DIAGNOSIS — I10 ESSENTIAL HYPERTENSION: ICD-10-CM

## 2019-08-23 DIAGNOSIS — L97.509 TYPE 2 DIABETES MELLITUS WITH FOOT ULCER, WITH LONG-TERM CURRENT USE OF INSULIN (HCC): Chronic | ICD-10-CM

## 2019-08-23 DIAGNOSIS — E11.621 TYPE 2 DIABETES MELLITUS WITH FOOT ULCER, WITH LONG-TERM CURRENT USE OF INSULIN (HCC): Chronic | ICD-10-CM

## 2019-08-23 LAB
ALBUMIN SERPL-MCNC: 3.8 G/DL (ref 3.5–5.2)
ALP BLD-CCNC: 80 U/L (ref 40–130)
ALT SERPL-CCNC: 11 U/L (ref 5–41)
ANION GAP SERPL CALCULATED.3IONS-SCNC: 17 MMOL/L (ref 7–19)
AST SERPL-CCNC: 14 U/L (ref 5–40)
BILIRUB SERPL-MCNC: 0.3 MG/DL (ref 0.2–1.2)
BUN BLDV-MCNC: 40 MG/DL (ref 8–23)
CALCIUM SERPL-MCNC: 9.4 MG/DL (ref 8.2–9.6)
CHLORIDE BLD-SCNC: 101 MMOL/L (ref 98–111)
CHOLESTEROL, TOTAL: 134 MG/DL (ref 160–199)
CO2: 21 MMOL/L (ref 22–29)
CREAT SERPL-MCNC: 1.4 MG/DL (ref 0.5–1.2)
GFR NON-AFRICAN AMERICAN: 47
GLUCOSE BLD-MCNC: 239 MG/DL (ref 74–109)
HBA1C MFR BLD: 10.1 % (ref 4–6)
HDLC SERPL-MCNC: 34 MG/DL (ref 55–121)
LDL CHOLESTEROL CALCULATED: 76 MG/DL
POTASSIUM SERPL-SCNC: 5.4 MMOL/L (ref 3.5–5)
SODIUM BLD-SCNC: 139 MMOL/L (ref 136–145)
TOTAL PROTEIN: 7.8 G/DL (ref 6.6–8.7)
TRIGL SERPL-MCNC: 118 MG/DL (ref 0–149)

## 2019-08-30 ENCOUNTER — OFFICE VISIT (OUTPATIENT)
Dept: INTERNAL MEDICINE | Age: 84
End: 2019-08-30
Payer: MEDICARE

## 2019-08-30 VITALS
DIASTOLIC BLOOD PRESSURE: 56 MMHG | OXYGEN SATURATION: 96 % | BODY MASS INDEX: 35.71 KG/M2 | SYSTOLIC BLOOD PRESSURE: 118 MMHG | HEART RATE: 68 BPM | WEIGHT: 228 LBS

## 2019-08-30 DIAGNOSIS — E78.00 PURE HYPERCHOLESTEROLEMIA: ICD-10-CM

## 2019-08-30 DIAGNOSIS — I50.32 CHRONIC DIASTOLIC CONGESTIVE HEART FAILURE (HCC): ICD-10-CM

## 2019-08-30 DIAGNOSIS — Z79.4 TYPE 2 DIABETES MELLITUS WITH FOOT ULCER, WITH LONG-TERM CURRENT USE OF INSULIN (HCC): Primary | Chronic | ICD-10-CM

## 2019-08-30 DIAGNOSIS — E11.621 TYPE 2 DIABETES MELLITUS WITH FOOT ULCER, WITH LONG-TERM CURRENT USE OF INSULIN (HCC): Primary | Chronic | ICD-10-CM

## 2019-08-30 DIAGNOSIS — N18.30 CKD (CHRONIC KIDNEY DISEASE) STAGE 3, GFR 30-59 ML/MIN (HCC): ICD-10-CM

## 2019-08-30 DIAGNOSIS — L97.509 TYPE 2 DIABETES MELLITUS WITH FOOT ULCER, WITH LONG-TERM CURRENT USE OF INSULIN (HCC): Primary | Chronic | ICD-10-CM

## 2019-08-30 PROCEDURE — G8427 DOCREV CUR MEDS BY ELIG CLIN: HCPCS | Performed by: INTERNAL MEDICINE

## 2019-08-30 PROCEDURE — G8417 CALC BMI ABV UP PARAM F/U: HCPCS | Performed by: INTERNAL MEDICINE

## 2019-08-30 PROCEDURE — 1123F ACP DISCUSS/DSCN MKR DOCD: CPT | Performed by: INTERNAL MEDICINE

## 2019-08-30 PROCEDURE — G8598 ASA/ANTIPLAT THER USED: HCPCS | Performed by: INTERNAL MEDICINE

## 2019-08-30 PROCEDURE — 1036F TOBACCO NON-USER: CPT | Performed by: INTERNAL MEDICINE

## 2019-08-30 PROCEDURE — 4040F PNEUMOC VAC/ADMIN/RCVD: CPT | Performed by: INTERNAL MEDICINE

## 2019-08-30 PROCEDURE — 99214 OFFICE O/P EST MOD 30 MIN: CPT | Performed by: INTERNAL MEDICINE

## 2019-08-30 ASSESSMENT — ENCOUNTER SYMPTOMS
SINUS PRESSURE: 0
NAUSEA: 0
EYE DISCHARGE: 0
EYE ITCHING: 0
BACK PAIN: 0
SHORTNESS OF BREATH: 1
ABDOMINAL PAIN: 0
VOMITING: 0
BLOOD IN STOOL: 0
WHEEZING: 0
TROUBLE SWALLOWING: 0
SORE THROAT: 0
ABDOMINAL DISTENTION: 0

## 2019-08-30 NOTE — PROGRESS NOTES
[facility] INTER[facility]IC[facility]  [address] [address]  [address] [address]  Dept: [phone]  Dept Fax: [phone]: [phone]      Visit Date: 8/30/2019    Diaz Cao a 80 y.o. male who presents today for:  Chief Complaint   Patient presents with    3 Month Follow-Up    Diabetes         HPI:     Viviana Chaparro is 80years old and in for a follow-up visit on his diabetes cholesterol and high blood pressure. He has known congestive heart failure. He is doing better he looks better he also has renal insufficiency and he had lab work for review. Social History     Tobacco Use    Smoking status: Former Smoker    Smokeless tobacco: Never Used   Substance Use Topics    Alcohol use: No      Current Outpatient Medications   Medication Sig Dispense Refill    ezetimibe (ZETIA) 10 MG tablet TAKE 1 TABLET DAILY 90 tablet 4    polyethylene glycol (GLYCOLAX) powder MIX 17 GRAMS WITH LIQUID AND DRINK ONCE DAILY. 527 g 0    spironolactone (ALDACTONE) 25 MG tablet TAKE 1 TABLET DAILY 90 tablet 3    isosorbide dinitrate (ISORDIL) 5 MG tablet TAKE 3 TABLETS TWICE A  tablet 4    nystatin (MYCOSTATIN) 895801 UNIT/GM cream Apply topically 2 times daily.  1 Tube 5    amLODIPine (NORVASC) 5 MG tablet TAKE 1 TABLET DAILY (MUST KEEP FOLLOW UP FOR ADDITIONAL REFILLS) 90 tablet 1    furosemide (LASIX) 40 MG tablet TAKE 1 TABLET DAILY 90 tablet 1    atorvastatin (LIPITOR) 10 MG tablet TAKE 1 TABLET DAILY AT BEDTIME 90 tablet 1    carvedilol (COREG) 3.125 MG tablet TAKE 1 TABLET TWICE A  tablet 1    apixaban (ELIQUIS) 2.5 MG TABS tablet Take 1 tablet by mouth 2 times daily 180 tablet 3    aspirin EC 81 MG EC tablet Take 1 tablet by mouth daily 90 tablet 1    Bismuth Tribromoph-Petrolatum (XEROFORM PETROLATUM GAUZE 5\"X9\") MISC external pads Apply 1 each topically every other day 15 each 0    albuterol sulfate HFA (PROVENTIL HFA) 108 (90 Base) MCG/ACT inhaler allergies. Neurological: Positive for weakness. Negative for dizziness, tremors, syncope, numbness and headaches. Hematological: Negative for adenopathy. Psychiatric/Behavioral: Negative for agitation and hallucinations. The patient is not nervous/anxious. Objective:     BP (!) 118/56   Pulse 68   Wt 228 lb (103.4 kg)   SpO2 96%   BMI 35.71 kg/m²   Physical Exam   Constitutional: He is oriented to person, place, and time. He appears well-developed and well-nourished. No distress. HENT:   Head: Normocephalic and atraumatic. Right Ear: External ear normal.   Left Ear: External ear normal.   Nose: Nose normal.   Mouth/Throat: Oropharynx is clear and moist. No oropharyngeal exudate. Eyes: Pupils are equal, round, and reactive to light. Conjunctivae and EOM are normal. Right eye exhibits no discharge. Left eye exhibits no discharge. No scleral icterus. Neck: Normal range of motion. Neck supple. No JVD present. No tracheal deviation present. No thyromegaly present. Cardiovascular: Normal rate, regular rhythm, normal heart sounds and intact distal pulses. Exam reveals no gallop and no friction rub. No murmur heard. Pulmonary/Chest: Effort normal and breath sounds normal. No respiratory distress. He has no wheezes. He has no rales. Abdominal: Soft. Bowel sounds are normal. He exhibits no distension and no mass. There is no tenderness. There is no rebound and no guarding. Musculoskeletal: Normal range of motion. He exhibits edema. He exhibits no tenderness or deformity. Lymphadenopathy:     He has no cervical adenopathy. Neurological: He is alert and oriented to person, place, and time. He has normal reflexes. He displays normal reflexes. No cranial nerve deficit. He exhibits normal muscle tone. Coordination normal.   Skin: Skin is warm and dry. No rash noted. He is not diaphoretic. No erythema. No pallor. Psychiatric: He has a normal mood and affect.  His behavior is normal. Judgment

## 2019-09-03 RX ORDER — CARVEDILOL 3.12 MG/1
TABLET ORAL
Qty: 180 TABLET | Refills: 4 | Status: SHIPPED | OUTPATIENT
Start: 2019-09-03 | End: 2020-01-01

## 2019-09-03 NOTE — TELEPHONE ENCOUNTER
Kirsten Martinez called requesting a refill of the below medication which has been pended for you:     Requested Prescriptions     Pending Prescriptions Disp Refills    carvedilol (COREG) 3.125 MG tablet [Pharmacy Med Name: CARVEDILOL TABS 3.125MG] 180 tablet 4     Sig: TAKE 1 TABLET TWICE A DAY       Last Appointment Date: 8/30/2019  Next Appointment Date: 11/29/2019    No Known Allergies

## 2019-09-16 RX ORDER — ATORVASTATIN CALCIUM 10 MG/1
TABLET, FILM COATED ORAL
Qty: 90 TABLET | Refills: 4 | Status: ON HOLD | OUTPATIENT
Start: 2019-09-16 | End: 2020-01-01

## 2019-09-16 NOTE — TELEPHONE ENCOUNTER
Brody Edward called requesting a refill of the below medication which has been pended for you:     Requested Prescriptions     Pending Prescriptions Disp Refills    atorvastatin (LIPITOR) 10 MG tablet [Pharmacy Med Name: ATORVASTATIN TABS 10MG] 90 tablet 4     Sig: TAKE 1 TABLET DAILY AT BEDTIME       Last Appointment Date: 8/30/2019  Next Appointment Date: 11/29/2019    No Known Allergies

## 2019-09-20 RX ORDER — FUROSEMIDE 40 MG/1
TABLET ORAL
Qty: 90 TABLET | Refills: 4 | Status: SHIPPED | OUTPATIENT
Start: 2019-09-20 | End: 2020-01-01

## 2019-09-30 RX ORDER — AMLODIPINE BESYLATE 5 MG/1
TABLET ORAL
Qty: 90 TABLET | Refills: 4 | Status: SHIPPED | OUTPATIENT
Start: 2019-09-30 | End: 2020-01-01

## 2019-10-28 ENCOUNTER — TELEPHONE (OUTPATIENT)
Dept: CARDIOLOGY | Age: 84
End: 2019-10-28

## 2019-11-04 DIAGNOSIS — Z95.0 PACEMAKER: Primary | ICD-10-CM

## 2019-11-04 DIAGNOSIS — I50.32 CHRONIC DIASTOLIC CONGESTIVE HEART FAILURE (HCC): ICD-10-CM

## 2019-11-04 DIAGNOSIS — I44.2 COMPLETE HEART BLOCK (HCC): ICD-10-CM

## 2019-11-04 PROCEDURE — 93294 REM INTERROG EVL PM/LDLS PM: CPT | Performed by: CLINICAL NURSE SPECIALIST

## 2019-11-04 PROCEDURE — 93296 REM INTERROG EVL PM/IDS: CPT | Performed by: CLINICAL NURSE SPECIALIST

## 2019-11-27 DIAGNOSIS — L97.509 TYPE 2 DIABETES MELLITUS WITH FOOT ULCER, WITH LONG-TERM CURRENT USE OF INSULIN (HCC): Chronic | ICD-10-CM

## 2019-11-27 DIAGNOSIS — N18.30 CKD (CHRONIC KIDNEY DISEASE) STAGE 3, GFR 30-59 ML/MIN (HCC): ICD-10-CM

## 2019-11-27 DIAGNOSIS — Z79.4 TYPE 2 DIABETES MELLITUS WITH FOOT ULCER, WITH LONG-TERM CURRENT USE OF INSULIN (HCC): Chronic | ICD-10-CM

## 2019-11-27 DIAGNOSIS — E78.00 PURE HYPERCHOLESTEROLEMIA: ICD-10-CM

## 2019-11-27 DIAGNOSIS — E11.621 TYPE 2 DIABETES MELLITUS WITH FOOT ULCER, WITH LONG-TERM CURRENT USE OF INSULIN (HCC): Chronic | ICD-10-CM

## 2019-11-27 DIAGNOSIS — I50.32 CHRONIC DIASTOLIC CONGESTIVE HEART FAILURE (HCC): ICD-10-CM

## 2019-11-27 LAB
ALBUMIN SERPL-MCNC: 3.8 G/DL (ref 3.5–5.2)
ALP BLD-CCNC: 82 U/L (ref 40–130)
ALT SERPL-CCNC: 10 U/L (ref 5–41)
ANION GAP SERPL CALCULATED.3IONS-SCNC: 17 MMOL/L (ref 7–19)
AST SERPL-CCNC: 12 U/L (ref 5–40)
BILIRUB SERPL-MCNC: 0.4 MG/DL (ref 0.2–1.2)
BUN BLDV-MCNC: 40 MG/DL (ref 8–23)
CALCIUM SERPL-MCNC: 9.1 MG/DL (ref 8.2–9.6)
CHLORIDE BLD-SCNC: 106 MMOL/L (ref 98–111)
CHOLESTEROL, TOTAL: 132 MG/DL (ref 160–199)
CO2: 20 MMOL/L (ref 22–29)
CREAT SERPL-MCNC: 1.5 MG/DL (ref 0.5–1.2)
GFR NON-AFRICAN AMERICAN: 43
GLUCOSE BLD-MCNC: 210 MG/DL (ref 74–109)
HBA1C MFR BLD: 11.2 % (ref 4–6)
HCT VFR BLD CALC: 35 % (ref 42–52)
HDLC SERPL-MCNC: 34 MG/DL (ref 55–121)
HEMOGLOBIN: 10.9 G/DL (ref 14–18)
LDL CHOLESTEROL CALCULATED: 69 MG/DL
MCH RBC QN AUTO: 31 PG (ref 27–31)
MCHC RBC AUTO-ENTMCNC: 31.1 G/DL (ref 33–37)
MCV RBC AUTO: 99.4 FL (ref 80–94)
PDW BLD-RTO: 13.2 % (ref 11.5–14.5)
PLATELET # BLD: 246 K/UL (ref 130–400)
PMV BLD AUTO: 10.8 FL (ref 9.4–12.4)
POTASSIUM SERPL-SCNC: 5.1 MMOL/L (ref 3.5–5)
RBC # BLD: 3.52 M/UL (ref 4.7–6.1)
SODIUM BLD-SCNC: 143 MMOL/L (ref 136–145)
TOTAL PROTEIN: 7.5 G/DL (ref 6.6–8.7)
TRIGL SERPL-MCNC: 146 MG/DL (ref 0–149)
WBC # BLD: 10.9 K/UL (ref 4.8–10.8)

## 2019-12-02 ENCOUNTER — OFFICE VISIT (OUTPATIENT)
Dept: INTERNAL MEDICINE | Age: 84
End: 2019-12-02
Payer: MEDICARE

## 2019-12-02 VITALS
HEART RATE: 60 BPM | BODY MASS INDEX: 36.49 KG/M2 | OXYGEN SATURATION: 100 % | DIASTOLIC BLOOD PRESSURE: 60 MMHG | SYSTOLIC BLOOD PRESSURE: 104 MMHG | WEIGHT: 233 LBS

## 2019-12-02 DIAGNOSIS — L97.509 TYPE 2 DIABETES MELLITUS WITH FOOT ULCER, WITH LONG-TERM CURRENT USE OF INSULIN (HCC): Primary | Chronic | ICD-10-CM

## 2019-12-02 DIAGNOSIS — E11.621 TYPE 2 DIABETES MELLITUS WITH FOOT ULCER, WITH LONG-TERM CURRENT USE OF INSULIN (HCC): Primary | Chronic | ICD-10-CM

## 2019-12-02 DIAGNOSIS — N39.3 MALE STRESS INCONTINENCE: ICD-10-CM

## 2019-12-02 DIAGNOSIS — Z79.4 TYPE 2 DIABETES MELLITUS WITH FOOT ULCER, WITH LONG-TERM CURRENT USE OF INSULIN (HCC): Primary | Chronic | ICD-10-CM

## 2019-12-02 PROCEDURE — 4040F PNEUMOC VAC/ADMIN/RCVD: CPT | Performed by: INTERNAL MEDICINE

## 2019-12-02 PROCEDURE — G8417 CALC BMI ABV UP PARAM F/U: HCPCS | Performed by: INTERNAL MEDICINE

## 2019-12-02 PROCEDURE — G8598 ASA/ANTIPLAT THER USED: HCPCS | Performed by: INTERNAL MEDICINE

## 2019-12-02 PROCEDURE — 1036F TOBACCO NON-USER: CPT | Performed by: INTERNAL MEDICINE

## 2019-12-02 PROCEDURE — G8427 DOCREV CUR MEDS BY ELIG CLIN: HCPCS | Performed by: INTERNAL MEDICINE

## 2019-12-02 PROCEDURE — G8484 FLU IMMUNIZE NO ADMIN: HCPCS | Performed by: INTERNAL MEDICINE

## 2019-12-02 PROCEDURE — 1123F ACP DISCUSS/DSCN MKR DOCD: CPT | Performed by: INTERNAL MEDICINE

## 2019-12-02 PROCEDURE — 99214 OFFICE O/P EST MOD 30 MIN: CPT | Performed by: INTERNAL MEDICINE

## 2019-12-02 RX ORDER — FLUCONAZOLE 100 MG/1
TABLET ORAL
Qty: 14 TABLET | Refills: 0 | Status: SHIPPED | OUTPATIENT
Start: 2019-12-02 | End: 2020-01-28

## 2019-12-02 RX ORDER — NYSTATIN 100000 [USP'U]/G
POWDER TOPICAL
COMMUNITY
Start: 2019-11-29 | End: 2019-12-02

## 2019-12-06 ENCOUNTER — TELEPHONE (OUTPATIENT)
Dept: CARDIOLOGY | Age: 84
End: 2019-12-06

## 2019-12-09 RX ORDER — NYSTATIN 100000 [USP'U]/G
POWDER TOPICAL
Qty: 60 G | Refills: 5 | Status: SHIPPED | OUTPATIENT
Start: 2019-12-09 | End: 2021-01-01 | Stop reason: ALTCHOICE

## 2019-12-09 RX ORDER — NYSTATIN 100000 U/G
CREAM TOPICAL
Qty: 1 TUBE | Refills: 5 | Status: SHIPPED | OUTPATIENT
Start: 2019-12-09 | End: 2021-01-01 | Stop reason: ALTCHOICE

## 2019-12-12 RX ORDER — INSULIN GLARGINE 100 [IU]/ML
INJECTION, SOLUTION SUBCUTANEOUS
Qty: 50 ML | Refills: 3 | Status: SHIPPED | OUTPATIENT
Start: 2019-12-12 | End: 2020-01-01

## 2019-12-30 RX ORDER — APIXABAN 2.5 MG/1
TABLET, FILM COATED ORAL
Qty: 180 TABLET | Refills: 4 | Status: SHIPPED | OUTPATIENT
Start: 2019-12-30

## 2020-01-01 ENCOUNTER — APPOINTMENT (OUTPATIENT)
Dept: GENERAL RADIOLOGY | Age: 85
DRG: 872 | End: 2020-01-01
Payer: MEDICARE

## 2020-01-01 ENCOUNTER — TELEPHONE (OUTPATIENT)
Dept: INTERNAL MEDICINE | Age: 85
End: 2020-01-01

## 2020-01-01 ENCOUNTER — APPOINTMENT (OUTPATIENT)
Dept: CT IMAGING | Age: 85
DRG: 872 | End: 2020-01-01
Payer: MEDICARE

## 2020-01-01 ENCOUNTER — OFFICE VISIT (OUTPATIENT)
Dept: CARDIOLOGY | Age: 85
End: 2020-01-01
Payer: MEDICARE

## 2020-01-01 ENCOUNTER — OFFICE VISIT (OUTPATIENT)
Dept: INTERNAL MEDICINE | Age: 85
End: 2020-01-01
Payer: MEDICARE

## 2020-01-01 ENCOUNTER — HOSPITAL ENCOUNTER (INPATIENT)
Age: 85
LOS: 3 days | Discharge: NURSING, REHAB OR LONG-TERM CARE | DRG: 872 | End: 2020-12-09
Attending: EMERGENCY MEDICINE | Admitting: HOSPITALIST
Payer: MEDICARE

## 2020-01-01 VITALS — SYSTOLIC BLOOD PRESSURE: 108 MMHG | HEART RATE: 64 BPM | DIASTOLIC BLOOD PRESSURE: 58 MMHG

## 2020-01-01 VITALS
OXYGEN SATURATION: 92 % | HEART RATE: 67 BPM | SYSTOLIC BLOOD PRESSURE: 150 MMHG | TEMPERATURE: 96.7 F | RESPIRATION RATE: 16 BRPM | BODY MASS INDEX: 33.18 KG/M2 | WEIGHT: 224 LBS | HEIGHT: 69 IN | DIASTOLIC BLOOD PRESSURE: 71 MMHG

## 2020-01-01 VITALS
DIASTOLIC BLOOD PRESSURE: 50 MMHG | HEART RATE: 67 BPM | SYSTOLIC BLOOD PRESSURE: 124 MMHG | HEIGHT: 67 IN | BODY MASS INDEX: 35.55 KG/M2

## 2020-01-01 VITALS — SYSTOLIC BLOOD PRESSURE: 138 MMHG | OXYGEN SATURATION: 98 % | HEART RATE: 68 BPM | DIASTOLIC BLOOD PRESSURE: 60 MMHG

## 2020-01-01 VITALS
OXYGEN SATURATION: 97 % | SYSTOLIC BLOOD PRESSURE: 122 MMHG | BODY MASS INDEX: 35.55 KG/M2 | DIASTOLIC BLOOD PRESSURE: 80 MMHG | WEIGHT: 227 LBS | HEART RATE: 62 BPM

## 2020-01-01 DIAGNOSIS — N18.30 CKD (CHRONIC KIDNEY DISEASE) STAGE 3, GFR 30-59 ML/MIN (HCC): ICD-10-CM

## 2020-01-01 DIAGNOSIS — E11.621 TYPE 2 DIABETES MELLITUS WITH FOOT ULCER, WITH LONG-TERM CURRENT USE OF INSULIN (HCC): Chronic | ICD-10-CM

## 2020-01-01 DIAGNOSIS — L97.509 TYPE 2 DIABETES MELLITUS WITH FOOT ULCER, WITH LONG-TERM CURRENT USE OF INSULIN (HCC): Chronic | ICD-10-CM

## 2020-01-01 DIAGNOSIS — Z79.4 TYPE 2 DIABETES MELLITUS WITH FOOT ULCER, WITH LONG-TERM CURRENT USE OF INSULIN (HCC): Chronic | ICD-10-CM

## 2020-01-01 DIAGNOSIS — E11.9 TYPE 2 DIABETES MELLITUS WITHOUT COMPLICATION, WITHOUT LONG-TERM CURRENT USE OF INSULIN (HCC): ICD-10-CM

## 2020-01-01 DIAGNOSIS — L30.9 CHRONIC DERMATITIS: ICD-10-CM

## 2020-01-01 LAB
ADENOVIRUS BY PCR: NOT DETECTED
ALBUMIN SERPL-MCNC: 3.5 G/DL (ref 3.5–5.2)
ALBUMIN SERPL-MCNC: 3.8 G/DL (ref 3.5–5.2)
ALBUMIN SERPL-MCNC: 3.8 G/DL (ref 3.5–5.2)
ALBUMIN SERPL-MCNC: 4 G/DL (ref 3.5–5.2)
ALP BLD-CCNC: 70 U/L (ref 40–130)
ALP BLD-CCNC: 73 U/L (ref 40–130)
ALP BLD-CCNC: 76 U/L (ref 40–130)
ALP BLD-CCNC: 82 U/L (ref 40–130)
ALT SERPL-CCNC: 10 U/L (ref 5–41)
ALT SERPL-CCNC: 9 U/L (ref 5–41)
ANION GAP SERPL CALCULATED.3IONS-SCNC: 11 MMOL/L (ref 7–19)
ANION GAP SERPL CALCULATED.3IONS-SCNC: 12 MMOL/L (ref 7–19)
ANION GAP SERPL CALCULATED.3IONS-SCNC: 13 MMOL/L (ref 7–19)
ANION GAP SERPL CALCULATED.3IONS-SCNC: 13 MMOL/L (ref 7–19)
ANION GAP SERPL CALCULATED.3IONS-SCNC: 14 MMOL/L (ref 7–19)
ANION GAP SERPL CALCULATED.3IONS-SCNC: 15 MMOL/L (ref 7–19)
ANION GAP SERPL CALCULATED.3IONS-SCNC: 16 MMOL/L (ref 7–19)
APTT: 37.4 SEC (ref 26–36.2)
AST SERPL-CCNC: 12 U/L (ref 5–40)
AST SERPL-CCNC: 13 U/L (ref 5–40)
AST SERPL-CCNC: 13 U/L (ref 5–40)
AST SERPL-CCNC: 17 U/L (ref 5–40)
BACTERIA: NEGATIVE /HPF
BACTERIA: NEGATIVE /HPF
BASOPHILS ABSOLUTE: 0 K/UL (ref 0–0.2)
BASOPHILS ABSOLUTE: 0.1 K/UL (ref 0–0.2)
BASOPHILS ABSOLUTE: 0.1 K/UL (ref 0–0.2)
BASOPHILS RELATIVE PERCENT: 0.1 % (ref 0–1)
BASOPHILS RELATIVE PERCENT: 0.1 % (ref 0–1)
BASOPHILS RELATIVE PERCENT: 0.2 % (ref 0–1)
BASOPHILS RELATIVE PERCENT: 0.2 % (ref 0–1)
BASOPHILS RELATIVE PERCENT: 0.5 % (ref 0–1)
BASOPHILS RELATIVE PERCENT: 0.5 % (ref 0–1)
BILIRUB SERPL-MCNC: 0.4 MG/DL (ref 0.2–1.2)
BILIRUB SERPL-MCNC: 0.4 MG/DL (ref 0.2–1.2)
BILIRUB SERPL-MCNC: 0.5 MG/DL (ref 0.2–1.2)
BILIRUB SERPL-MCNC: 0.6 MG/DL (ref 0.2–1.2)
BILIRUBIN URINE: NEGATIVE
BILIRUBIN URINE: NEGATIVE
BLOOD CULTURE, ROUTINE: NORMAL
BLOOD, URINE: NEGATIVE
BLOOD, URINE: NEGATIVE
BORDETELLA PARAPERTUSSIS BY PCR: NOT DETECTED
BORDETELLA PERTUSSIS BY PCR: NOT DETECTED
BUN BLDV-MCNC: 34 MG/DL (ref 8–23)
BUN BLDV-MCNC: 40 MG/DL (ref 8–23)
BUN BLDV-MCNC: 43 MG/DL (ref 8–23)
BUN BLDV-MCNC: 43 MG/DL (ref 8–23)
BUN BLDV-MCNC: 48 MG/DL (ref 8–23)
BUN BLDV-MCNC: 52 MG/DL (ref 8–23)
BUN BLDV-MCNC: 52 MG/DL (ref 8–23)
CALCIUM SERPL-MCNC: 10 MG/DL (ref 8.2–9.6)
CALCIUM SERPL-MCNC: 7.3 MG/DL (ref 8.2–9.6)
CALCIUM SERPL-MCNC: 7.7 MG/DL (ref 8.2–9.6)
CALCIUM SERPL-MCNC: 8.3 MG/DL (ref 8.2–9.6)
CALCIUM SERPL-MCNC: 9 MG/DL (ref 8.2–9.6)
CALCIUM SERPL-MCNC: 9.3 MG/DL (ref 8.2–9.6)
CALCIUM SERPL-MCNC: 9.5 MG/DL (ref 8.2–9.6)
CHLAMYDOPHILIA PNEUMONIAE BY PCR: NOT DETECTED
CHLORIDE BLD-SCNC: 102 MMOL/L (ref 98–111)
CHLORIDE BLD-SCNC: 102 MMOL/L (ref 98–111)
CHLORIDE BLD-SCNC: 103 MMOL/L (ref 98–111)
CHLORIDE BLD-SCNC: 104 MMOL/L (ref 98–111)
CHLORIDE BLD-SCNC: 96 MMOL/L (ref 98–111)
CHOLESTEROL, TOTAL: 135 MG/DL (ref 160–199)
CLARITY: CLEAR
CLARITY: CLEAR
CO2: 20 MMOL/L (ref 22–29)
CO2: 22 MMOL/L (ref 22–29)
CO2: 22 MMOL/L (ref 22–29)
CO2: 23 MMOL/L (ref 22–29)
CO2: 23 MMOL/L (ref 22–29)
CO2: 24 MMOL/L (ref 22–29)
CO2: 25 MMOL/L (ref 22–29)
COLOR: YELLOW
COLOR: YELLOW
CORONAVIRUS 229E BY PCR: NOT DETECTED
CORONAVIRUS HKU1 BY PCR: NOT DETECTED
CORONAVIRUS NL63 BY PCR: NOT DETECTED
CORONAVIRUS OC43 BY PCR: NOT DETECTED
CREAT SERPL-MCNC: 1.5 MG/DL (ref 0.5–1.2)
CREAT SERPL-MCNC: 1.6 MG/DL (ref 0.5–1.2)
CREAT SERPL-MCNC: 1.7 MG/DL (ref 0.5–1.2)
CREAT SERPL-MCNC: 2 MG/DL (ref 0.5–1.2)
CREAT SERPL-MCNC: 2.1 MG/DL (ref 0.5–1.2)
CREATININE URINE: 141 MG/DL (ref 4.2–622)
CRYSTALS, UA: ABNORMAL /HPF
CRYSTALS, UA: ABNORMAL /HPF
CULTURE, BLOOD 2: NORMAL
EOSINOPHILS ABSOLUTE: 0 K/UL (ref 0–0.6)
EOSINOPHILS ABSOLUTE: 0.2 K/UL (ref 0–0.6)
EOSINOPHILS ABSOLUTE: 0.2 K/UL (ref 0–0.6)
EOSINOPHILS ABSOLUTE: 0.3 K/UL (ref 0–0.6)
EOSINOPHILS ABSOLUTE: 0.5 K/UL (ref 0–0.6)
EOSINOPHILS ABSOLUTE: 0.6 K/UL (ref 0–0.6)
EOSINOPHILS RELATIVE PERCENT: 0.1 % (ref 0–5)
EOSINOPHILS RELATIVE PERCENT: 0.9 % (ref 0–5)
EOSINOPHILS RELATIVE PERCENT: 1.4 % (ref 0–5)
EOSINOPHILS RELATIVE PERCENT: 1.7 % (ref 0–5)
EOSINOPHILS RELATIVE PERCENT: 4.7 % (ref 0–5)
EOSINOPHILS RELATIVE PERCENT: 4.8 % (ref 0–5)
EPITHELIAL CELLS, UA: 0 /HPF (ref 0–5)
EPITHELIAL CELLS, UA: 1 /HPF (ref 0–5)
GFR AFRICAN AMERICAN: 36
GFR AFRICAN AMERICAN: 38
GFR AFRICAN AMERICAN: 49
GFR AFRICAN AMERICAN: 53
GFR NON-AFRICAN AMERICAN: 29
GFR NON-AFRICAN AMERICAN: 31
GFR NON-AFRICAN AMERICAN: 38
GFR NON-AFRICAN AMERICAN: 40
GFR NON-AFRICAN AMERICAN: 43
GLUCOSE BLD-MCNC: 130 MG/DL (ref 74–109)
GLUCOSE BLD-MCNC: 161 MG/DL (ref 74–109)
GLUCOSE BLD-MCNC: 215 MG/DL (ref 74–109)
GLUCOSE BLD-MCNC: 252 MG/DL (ref 70–99)
GLUCOSE BLD-MCNC: 261 MG/DL (ref 74–109)
GLUCOSE BLD-MCNC: 274 MG/DL (ref 74–109)
GLUCOSE BLD-MCNC: 289 MG/DL (ref 70–99)
GLUCOSE BLD-MCNC: 294 MG/DL (ref 70–99)
GLUCOSE BLD-MCNC: 295 MG/DL (ref 70–99)
GLUCOSE BLD-MCNC: 300 MG/DL (ref 74–109)
GLUCOSE BLD-MCNC: 312 MG/DL (ref 70–99)
GLUCOSE BLD-MCNC: 318 MG/DL (ref 70–99)
GLUCOSE BLD-MCNC: 327 MG/DL (ref 70–99)
GLUCOSE BLD-MCNC: 333 MG/DL (ref 74–109)
GLUCOSE BLD-MCNC: 342 MG/DL (ref 70–99)
GLUCOSE BLD-MCNC: 388 MG/DL (ref 70–99)
GLUCOSE BLD-MCNC: 403 MG/DL (ref 70–99)
GLUCOSE URINE: =>1000 MG/DL
GLUCOSE URINE: NEGATIVE MG/DL
HBA1C MFR BLD: 10.1 % (ref 4–6)
HBA1C MFR BLD: 9.5 % (ref 4–6)
HCT VFR BLD CALC: 32.5 % (ref 42–52)
HCT VFR BLD CALC: 33.4 % (ref 42–52)
HCT VFR BLD CALC: 35 % (ref 42–52)
HCT VFR BLD CALC: 35.1 % (ref 42–52)
HCT VFR BLD CALC: 35.8 % (ref 42–52)
HCT VFR BLD CALC: 37.9 % (ref 42–52)
HDLC SERPL-MCNC: 34 MG/DL (ref 55–121)
HEMOGLOBIN: 10.4 G/DL (ref 14–18)
HEMOGLOBIN: 10.7 G/DL (ref 14–18)
HEMOGLOBIN: 11.2 G/DL (ref 14–18)
HEMOGLOBIN: 11.2 G/DL (ref 14–18)
HEMOGLOBIN: 11.6 G/DL (ref 14–18)
HEMOGLOBIN: 12.1 G/DL (ref 14–18)
HUMAN METAPNEUMOVIRUS BY PCR: NOT DETECTED
HUMAN RHINOVIRUS/ENTEROVIRUS BY PCR: NOT DETECTED
HYALINE CASTS: 1 /HPF (ref 0–8)
HYALINE CASTS: 1 /HPF (ref 0–8)
IMMATURE GRANULOCYTES #: 0 K/UL
IMMATURE GRANULOCYTES #: 0 K/UL
IMMATURE GRANULOCYTES #: 0.1 K/UL
INFLUENZA A BY PCR: NOT DETECTED
INFLUENZA B BY PCR: NOT DETECTED
INR BLD: 1.48 (ref 0.88–1.18)
KETONES, URINE: ABNORMAL MG/DL
KETONES, URINE: NEGATIVE MG/DL
LACTIC ACID, SEPSIS: 1.1 MG/DL (ref 0.5–1.9)
LACTIC ACID, SEPSIS: 1.5 MG/DL (ref 0.5–1.9)
LDL CHOLESTEROL CALCULATED: 77 MG/DL
LEUKOCYTE ESTERASE, URINE: ABNORMAL
LEUKOCYTE ESTERASE, URINE: ABNORMAL
LIPASE: 13 U/L (ref 13–60)
LYMPHOCYTES ABSOLUTE: 2.9 K/UL (ref 1.1–4.5)
LYMPHOCYTES ABSOLUTE: 3 K/UL (ref 1.1–4.5)
LYMPHOCYTES ABSOLUTE: 3.2 K/UL (ref 1.1–4.5)
LYMPHOCYTES ABSOLUTE: 3.7 K/UL (ref 1.1–4.5)
LYMPHOCYTES ABSOLUTE: 3.9 K/UL (ref 1.1–4.5)
LYMPHOCYTES ABSOLUTE: 4.3 K/UL (ref 1.1–4.5)
LYMPHOCYTES RELATIVE PERCENT: 16.1 % (ref 20–40)
LYMPHOCYTES RELATIVE PERCENT: 17.2 % (ref 20–40)
LYMPHOCYTES RELATIVE PERCENT: 18 % (ref 20–40)
LYMPHOCYTES RELATIVE PERCENT: 19.3 % (ref 20–40)
LYMPHOCYTES RELATIVE PERCENT: 31.8 % (ref 20–40)
LYMPHOCYTES RELATIVE PERCENT: 35.7 % (ref 20–40)
MCH RBC QN AUTO: 31.2 PG (ref 27–31)
MCH RBC QN AUTO: 31.4 PG (ref 27–31)
MCH RBC QN AUTO: 31.4 PG (ref 27–31)
MCH RBC QN AUTO: 31.5 PG (ref 27–31)
MCH RBC QN AUTO: 31.5 PG (ref 27–31)
MCH RBC QN AUTO: 31.8 PG (ref 27–31)
MCHC RBC AUTO-ENTMCNC: 31.9 G/DL (ref 33–37)
MCHC RBC AUTO-ENTMCNC: 31.9 G/DL (ref 33–37)
MCHC RBC AUTO-ENTMCNC: 32 G/DL (ref 33–37)
MCHC RBC AUTO-ENTMCNC: 32.4 G/DL (ref 33–37)
MCV RBC AUTO: 97.3 FL (ref 80–94)
MCV RBC AUTO: 97.7 FL (ref 80–94)
MCV RBC AUTO: 98.2 FL (ref 80–94)
MCV RBC AUTO: 98.3 FL (ref 80–94)
MCV RBC AUTO: 98.6 FL (ref 80–94)
MCV RBC AUTO: 99.1 FL (ref 80–94)
MONOCYTES ABSOLUTE: 0.8 K/UL (ref 0–0.9)
MONOCYTES ABSOLUTE: 0.9 K/UL (ref 0–0.9)
MONOCYTES ABSOLUTE: 1.1 K/UL (ref 0–0.9)
MONOCYTES ABSOLUTE: 1.2 K/UL (ref 0–0.9)
MONOCYTES ABSOLUTE: 1.2 K/UL (ref 0–0.9)
MONOCYTES ABSOLUTE: 1.4 K/UL (ref 0–0.9)
MONOCYTES RELATIVE PERCENT: 5.4 % (ref 0–10)
MONOCYTES RELATIVE PERCENT: 5.5 % (ref 0–10)
MONOCYTES RELATIVE PERCENT: 6.8 % (ref 0–10)
MONOCYTES RELATIVE PERCENT: 7 % (ref 0–10)
MONOCYTES RELATIVE PERCENT: 7.7 % (ref 0–10)
MONOCYTES RELATIVE PERCENT: 8.6 % (ref 0–10)
MYCOPLASMA PNEUMONIAE BY PCR: NOT DETECTED
NEUTROPHILS ABSOLUTE: 10.7 K/UL (ref 1.5–7.5)
NEUTROPHILS ABSOLUTE: 11.9 K/UL (ref 1.5–7.5)
NEUTROPHILS ABSOLUTE: 15.4 K/UL (ref 1.5–7.5)
NEUTROPHILS ABSOLUTE: 19 K/UL (ref 1.5–7.5)
NEUTROPHILS ABSOLUTE: 5.3 K/UL (ref 1.5–7.5)
NEUTROPHILS ABSOLUTE: 6.9 K/UL (ref 1.5–7.5)
NEUTROPHILS RELATIVE PERCENT: 50.2 % (ref 50–65)
NEUTROPHILS RELATIVE PERCENT: 56 % (ref 50–65)
NEUTROPHILS RELATIVE PERCENT: 70.5 % (ref 50–65)
NEUTROPHILS RELATIVE PERCENT: 72.6 % (ref 50–65)
NEUTROPHILS RELATIVE PERCENT: 76.7 % (ref 50–65)
NEUTROPHILS RELATIVE PERCENT: 76.9 % (ref 50–65)
NITRITE, URINE: NEGATIVE
NITRITE, URINE: NEGATIVE
PARAINFLUENZA VIRUS 1 BY PCR: NOT DETECTED
PARAINFLUENZA VIRUS 2 BY PCR: NOT DETECTED
PARAINFLUENZA VIRUS 3 BY PCR: NOT DETECTED
PARAINFLUENZA VIRUS 4 BY PCR: NOT DETECTED
PARATHYROID HORMONE INTACT: 13.4 PG/ML (ref 15–65)
PDW BLD-RTO: 13.2 % (ref 11.5–14.5)
PDW BLD-RTO: 13.2 % (ref 11.5–14.5)
PDW BLD-RTO: 13.4 % (ref 11.5–14.5)
PDW BLD-RTO: 13.4 % (ref 11.5–14.5)
PDW BLD-RTO: 13.6 % (ref 11.5–14.5)
PDW BLD-RTO: 13.7 % (ref 11.5–14.5)
PERFORMED ON: ABNORMAL
PH UA: 5 (ref 5–8)
PH UA: 5 (ref 5–8)
PLATELET # BLD: 184 K/UL (ref 130–400)
PLATELET # BLD: 196 K/UL (ref 130–400)
PLATELET # BLD: 210 K/UL (ref 130–400)
PLATELET # BLD: 214 K/UL (ref 130–400)
PMV BLD AUTO: 11.5 FL (ref 9.4–12.4)
PMV BLD AUTO: 11.7 FL (ref 9.4–12.4)
PMV BLD AUTO: 11.7 FL (ref 9.4–12.4)
PMV BLD AUTO: 12 FL (ref 9.4–12.4)
PMV BLD AUTO: 12.1 FL (ref 9.4–12.4)
PMV BLD AUTO: 12.2 FL (ref 9.4–12.4)
POTASSIUM REFLEX MAGNESIUM: 4.2 MMOL/L (ref 3.5–5)
POTASSIUM REFLEX MAGNESIUM: 4.3 MMOL/L (ref 3.5–5)
POTASSIUM REFLEX MAGNESIUM: 4.4 MMOL/L (ref 3.5–5)
POTASSIUM REFLEX MAGNESIUM: 4.9 MMOL/L (ref 3.5–5)
POTASSIUM SERPL-SCNC: 4.6 MMOL/L (ref 3.5–5)
POTASSIUM SERPL-SCNC: 4.7 MMOL/L (ref 3.5–5)
POTASSIUM SERPL-SCNC: 4.9 MMOL/L (ref 3.5–5)
PRO-BNP: 2010 PG/ML (ref 0–1800)
PROCALCITONIN: 1.4 NG/ML (ref 0–0.09)
PROTEIN PROTEIN: 13 MG/DL (ref 15–45)
PROTEIN UA: 30 MG/DL
PROTEIN UA: NEGATIVE MG/DL
PROTHROMBIN TIME: 18 SEC (ref 12–14.6)
RBC # BLD: 3.31 M/UL (ref 4.7–6.1)
RBC # BLD: 3.37 M/UL (ref 4.7–6.1)
RBC # BLD: 3.55 M/UL (ref 4.7–6.1)
RBC # BLD: 3.57 M/UL (ref 4.7–6.1)
RBC # BLD: 3.68 M/UL (ref 4.7–6.1)
RBC # BLD: 3.88 M/UL (ref 4.7–6.1)
RBC UA: 2 /HPF (ref 0–4)
RBC UA: 4 /HPF (ref 0–4)
RESPIRATORY SYNCYTIAL VIRUS BY PCR: NOT DETECTED
SARS-COV-2, PCR: NOT DETECTED
SARS-COV-2, PCR: NOT DETECTED
SODIUM BLD-SCNC: 134 MMOL/L (ref 136–145)
SODIUM BLD-SCNC: 134 MMOL/L (ref 136–145)
SODIUM BLD-SCNC: 137 MMOL/L (ref 136–145)
SODIUM BLD-SCNC: 138 MMOL/L (ref 136–145)
SODIUM BLD-SCNC: 141 MMOL/L (ref 136–145)
SPECIFIC GRAVITY UA: 1.02 (ref 1–1.03)
SPECIFIC GRAVITY UA: 1.02 (ref 1–1.03)
TOTAL PROTEIN: 7.3 G/DL (ref 6.6–8.7)
TOTAL PROTEIN: 7.3 G/DL (ref 6.6–8.7)
TOTAL PROTEIN: 7.7 G/DL (ref 6.6–8.7)
TOTAL PROTEIN: 8.2 G/DL (ref 6.6–8.7)
TRIGL SERPL-MCNC: 118 MG/DL (ref 0–149)
URIC ACID, SERUM: 6.9 MG/DL (ref 3.4–7)
URIC ACID, SERUM: 8.1 MG/DL (ref 3.4–7)
URINE CULTURE, ROUTINE: NORMAL
UROBILINOGEN, URINE: 0.2 E.U./DL
UROBILINOGEN, URINE: 0.2 E.U./DL
VITAMIN D 25-HYDROXY: 41.1 NG/ML
WBC # BLD: 10.5 K/UL (ref 4.8–10.8)
WBC # BLD: 12.3 K/UL (ref 4.8–10.8)
WBC # BLD: 15.2 K/UL (ref 4.8–10.8)
WBC # BLD: 16.4 K/UL (ref 4.8–10.8)
WBC # BLD: 20 K/UL (ref 4.8–10.8)
WBC # BLD: 24.8 K/UL (ref 4.8–10.8)
WBC UA: 13 /HPF (ref 0–5)
WBC UA: 42 /HPF (ref 0–5)

## 2020-01-01 PROCEDURE — U0003 INFECTIOUS AGENT DETECTION BY NUCLEIC ACID (DNA OR RNA); SEVERE ACUTE RESPIRATORY SYNDROME CORONAVIRUS 2 (SARS-COV-2) (CORONAVIRUS DISEASE [COVID-19]), AMPLIFIED PROBE TECHNIQUE, MAKING USE OF HIGH THROUGHPUT TECHNOLOGIES AS DESCRIBED BY CMS-2020-01-R: HCPCS

## 2020-01-01 PROCEDURE — 96365 THER/PROPH/DIAG IV INF INIT: CPT

## 2020-01-01 PROCEDURE — G8417 CALC BMI ABV UP PARAM F/U: HCPCS | Performed by: INTERNAL MEDICINE

## 2020-01-01 PROCEDURE — 6370000000 HC RX 637 (ALT 250 FOR IP): Performed by: PHYSICIAN ASSISTANT

## 2020-01-01 PROCEDURE — 2580000003 HC RX 258: Performed by: PHYSICIAN ASSISTANT

## 2020-01-01 PROCEDURE — 2580000003 HC RX 258: Performed by: EMERGENCY MEDICINE

## 2020-01-01 PROCEDURE — 96375 TX/PRO/DX INJ NEW DRUG ADDON: CPT

## 2020-01-01 PROCEDURE — 36415 COLL VENOUS BLD VENIPUNCTURE: CPT

## 2020-01-01 PROCEDURE — G8427 DOCREV CUR MEDS BY ELIG CLIN: HCPCS | Performed by: INTERNAL MEDICINE

## 2020-01-01 PROCEDURE — 2500000003 HC RX 250 WO HCPCS: Performed by: PHYSICIAN ASSISTANT

## 2020-01-01 PROCEDURE — 85025 COMPLETE CBC W/AUTO DIFF WBC: CPT

## 2020-01-01 PROCEDURE — 82947 ASSAY GLUCOSE BLOOD QUANT: CPT

## 2020-01-01 PROCEDURE — 93970 EXTREMITY STUDY: CPT

## 2020-01-01 PROCEDURE — 71045 X-RAY EXAM CHEST 1 VIEW: CPT

## 2020-01-01 PROCEDURE — 0202U NFCT DS 22 TRGT SARS-COV-2: CPT

## 2020-01-01 PROCEDURE — 1210000000 HC MED SURG R&B

## 2020-01-01 PROCEDURE — 1123F ACP DISCUSS/DSCN MKR DOCD: CPT | Performed by: INTERNAL MEDICINE

## 2020-01-01 PROCEDURE — 6360000002 HC RX W HCPCS: Performed by: PHYSICIAN ASSISTANT

## 2020-01-01 PROCEDURE — 6360000002 HC RX W HCPCS: Performed by: EMERGENCY MEDICINE

## 2020-01-01 PROCEDURE — 99214 OFFICE O/P EST MOD 30 MIN: CPT | Performed by: INTERNAL MEDICINE

## 2020-01-01 PROCEDURE — 74176 CT ABD & PELVIS W/O CONTRAST: CPT

## 2020-01-01 PROCEDURE — 97535 SELF CARE MNGMENT TRAINING: CPT

## 2020-01-01 PROCEDURE — 6370000000 HC RX 637 (ALT 250 FOR IP): Performed by: HOSPITALIST

## 2020-01-01 PROCEDURE — G8427 DOCREV CUR MEDS BY ELIG CLIN: HCPCS | Performed by: CLINICAL NURSE SPECIALIST

## 2020-01-01 PROCEDURE — 83690 ASSAY OF LIPASE: CPT

## 2020-01-01 PROCEDURE — 99284 EMERGENCY DEPT VISIT MOD MDM: CPT

## 2020-01-01 PROCEDURE — 83880 ASSAY OF NATRIURETIC PEPTIDE: CPT

## 2020-01-01 PROCEDURE — 93296 REM INTERROG EVL PM/IDS: CPT | Performed by: CLINICAL NURSE SPECIALIST

## 2020-01-01 PROCEDURE — 6360000002 HC RX W HCPCS: Performed by: HOSPITALIST

## 2020-01-01 PROCEDURE — 81001 URINALYSIS AUTO W/SCOPE: CPT

## 2020-01-01 PROCEDURE — 1036F TOBACCO NON-USER: CPT | Performed by: INTERNAL MEDICINE

## 2020-01-01 PROCEDURE — 80053 COMPREHEN METABOLIC PANEL: CPT

## 2020-01-01 PROCEDURE — 99221 1ST HOSP IP/OBS SF/LOW 40: CPT | Performed by: INTERNAL MEDICINE

## 2020-01-01 PROCEDURE — 99213 OFFICE O/P EST LOW 20 MIN: CPT | Performed by: INTERNAL MEDICINE

## 2020-01-01 PROCEDURE — 97530 THERAPEUTIC ACTIVITIES: CPT

## 2020-01-01 PROCEDURE — 1036F TOBACCO NON-USER: CPT | Performed by: CLINICAL NURSE SPECIALIST

## 2020-01-01 PROCEDURE — 2700000000 HC OXYGEN THERAPY PER DAY

## 2020-01-01 PROCEDURE — 99213 OFFICE O/P EST LOW 20 MIN: CPT | Performed by: CLINICAL NURSE SPECIALIST

## 2020-01-01 PROCEDURE — 4040F PNEUMOC VAC/ADMIN/RCVD: CPT | Performed by: INTERNAL MEDICINE

## 2020-01-01 PROCEDURE — 93280 PM DEVICE PROGR EVAL DUAL: CPT | Performed by: CLINICAL NURSE SPECIALIST

## 2020-01-01 PROCEDURE — 85610 PROTHROMBIN TIME: CPT

## 2020-01-01 PROCEDURE — 83605 ASSAY OF LACTIC ACID: CPT

## 2020-01-01 PROCEDURE — 84145 PROCALCITONIN (PCT): CPT

## 2020-01-01 PROCEDURE — 84550 ASSAY OF BLOOD/URIC ACID: CPT

## 2020-01-01 PROCEDURE — 87040 BLOOD CULTURE FOR BACTERIA: CPT

## 2020-01-01 PROCEDURE — 73610 X-RAY EXAM OF ANKLE: CPT

## 2020-01-01 PROCEDURE — G8417 CALC BMI ABV UP PARAM F/U: HCPCS | Performed by: CLINICAL NURSE SPECIALIST

## 2020-01-01 PROCEDURE — 80048 BASIC METABOLIC PNL TOTAL CA: CPT

## 2020-01-01 PROCEDURE — 85730 THROMBOPLASTIN TIME PARTIAL: CPT

## 2020-01-01 PROCEDURE — 4040F PNEUMOC VAC/ADMIN/RCVD: CPT | Performed by: CLINICAL NURSE SPECIALIST

## 2020-01-01 PROCEDURE — 93294 REM INTERROG EVL PM/LDLS PM: CPT | Performed by: CLINICAL NURSE SPECIALIST

## 2020-01-01 PROCEDURE — 87086 URINE CULTURE/COLONY COUNT: CPT

## 2020-01-01 PROCEDURE — 97162 PT EVAL MOD COMPLEX 30 MIN: CPT

## 2020-01-01 PROCEDURE — 2500000003 HC RX 250 WO HCPCS: Performed by: EMERGENCY MEDICINE

## 2020-01-01 PROCEDURE — 97166 OT EVAL MOD COMPLEX 45 MIN: CPT

## 2020-01-01 PROCEDURE — 1123F ACP DISCUSS/DSCN MKR DOCD: CPT | Performed by: CLINICAL NURSE SPECIALIST

## 2020-01-01 PROCEDURE — 99233 SBSQ HOSP IP/OBS HIGH 50: CPT | Performed by: INTERNAL MEDICINE

## 2020-01-01 RX ORDER — 0.9 % SODIUM CHLORIDE 0.9 %
500 INTRAVENOUS SOLUTION INTRAVENOUS ONCE
Status: COMPLETED | OUTPATIENT
Start: 2020-01-01 | End: 2020-01-01

## 2020-01-01 RX ORDER — ALBUTEROL SULFATE 2.5 MG/3ML
2.5 SOLUTION RESPIRATORY (INHALATION) EVERY 6 HOURS PRN
Status: DISCONTINUED | OUTPATIENT
Start: 2020-01-01 | End: 2020-01-01 | Stop reason: HOSPADM

## 2020-01-01 RX ORDER — INSULIN LISPRO 100 [IU]/ML
INJECTION, SOLUTION INTRAVENOUS; SUBCUTANEOUS
Qty: 15 ML | Refills: 11 | Status: SHIPPED | OUTPATIENT
Start: 2020-01-01

## 2020-01-01 RX ORDER — FLASH GLUCOSE SENSOR
KIT MISCELLANEOUS
Qty: 2 EACH | Refills: 5 | Status: SHIPPED | OUTPATIENT
Start: 2020-01-01

## 2020-01-01 RX ORDER — POLYETHYLENE GLYCOL 3350 17 G/17G
17 POWDER, FOR SOLUTION ORAL DAILY PRN
Status: DISCONTINUED | OUTPATIENT
Start: 2020-01-01 | End: 2020-01-01 | Stop reason: HOSPADM

## 2020-01-01 RX ORDER — ACETAMINOPHEN 325 MG/1
650 TABLET ORAL EVERY 6 HOURS PRN
Status: DISCONTINUED | OUTPATIENT
Start: 2020-01-01 | End: 2020-01-01 | Stop reason: HOSPADM

## 2020-01-01 RX ORDER — ONDANSETRON 2 MG/ML
4 INJECTION INTRAMUSCULAR; INTRAVENOUS EVERY 6 HOURS PRN
Status: DISCONTINUED | OUTPATIENT
Start: 2020-01-01 | End: 2020-01-01 | Stop reason: HOSPADM

## 2020-01-01 RX ORDER — INSULIN GLARGINE 100 [IU]/ML
INJECTION, SOLUTION SUBCUTANEOUS
Qty: 15 ML | Refills: 11 | Status: SHIPPED | OUTPATIENT
Start: 2020-01-01

## 2020-01-01 RX ORDER — INSULIN LISPRO 100 [IU]/ML
INJECTION, SOLUTION INTRAVENOUS; SUBCUTANEOUS
Qty: 6 PEN | Refills: 3 | Status: SHIPPED | OUTPATIENT
Start: 2020-01-01 | End: 2020-01-01 | Stop reason: SDUPTHER

## 2020-01-01 RX ORDER — MECLIZINE HYDROCHLORIDE 25 MG/1
12.5 TABLET ORAL 2 TIMES DAILY
Status: DISCONTINUED | OUTPATIENT
Start: 2020-01-01 | End: 2020-01-01

## 2020-01-01 RX ORDER — AMLODIPINE BESYLATE 5 MG/1
TABLET ORAL
Qty: 90 TABLET | Refills: 3 | Status: SHIPPED | OUTPATIENT
Start: 2020-01-01

## 2020-01-01 RX ORDER — CIPROFLOXACIN 500 MG/1
500 TABLET, FILM COATED ORAL 2 TIMES DAILY
Qty: 20 TABLET | Refills: 0 | Status: SHIPPED | OUTPATIENT
Start: 2020-01-01 | End: 2020-01-01

## 2020-01-01 RX ORDER — DOXYCYCLINE 100 MG/1
100 TABLET ORAL 2 TIMES DAILY
Qty: 20 TABLET | Refills: 0 | Status: SHIPPED | OUTPATIENT
Start: 2020-01-01 | End: 2020-01-01

## 2020-01-01 RX ORDER — ATORVASTATIN CALCIUM 10 MG/1
TABLET, FILM COATED ORAL
Qty: 90 TABLET | Refills: 3 | Status: SHIPPED | OUTPATIENT
Start: 2020-01-01

## 2020-01-01 RX ORDER — SODIUM CHLORIDE 9 MG/ML
INJECTION, SOLUTION INTRAVENOUS CONTINUOUS
Status: DISCONTINUED | OUTPATIENT
Start: 2020-01-01 | End: 2020-01-01 | Stop reason: HOSPADM

## 2020-01-01 RX ORDER — MORPHINE SULFATE 4 MG/ML
2 INJECTION, SOLUTION INTRAMUSCULAR; INTRAVENOUS ONCE
Status: COMPLETED | OUTPATIENT
Start: 2020-01-01 | End: 2020-01-01

## 2020-01-01 RX ORDER — TIMOLOL MALEATE 5 MG/ML
1 SOLUTION/ DROPS OPHTHALMIC 2 TIMES DAILY
Status: DISCONTINUED | OUTPATIENT
Start: 2020-01-01 | End: 2020-01-01 | Stop reason: HOSPADM

## 2020-01-01 RX ORDER — INSULIN GLARGINE 100 [IU]/ML
10 INJECTION, SOLUTION SUBCUTANEOUS 2 TIMES DAILY
Status: DISCONTINUED | OUTPATIENT
Start: 2020-01-01 | End: 2020-01-01 | Stop reason: HOSPADM

## 2020-01-01 RX ORDER — SPIRONOLACTONE 25 MG/1
TABLET ORAL
Qty: 90 TABLET | Refills: 3 | Status: SHIPPED | OUTPATIENT
Start: 2020-01-01

## 2020-01-01 RX ORDER — DEXTROSE MONOHYDRATE 50 MG/ML
100 INJECTION, SOLUTION INTRAVENOUS PRN
Status: DISCONTINUED | OUTPATIENT
Start: 2020-01-01 | End: 2020-01-01 | Stop reason: HOSPADM

## 2020-01-01 RX ORDER — NICOTINE POLACRILEX 4 MG
15 LOZENGE BUCCAL PRN
Status: DISCONTINUED | OUTPATIENT
Start: 2020-01-01 | End: 2020-01-01 | Stop reason: HOSPADM

## 2020-01-01 RX ORDER — PROMETHAZINE HYDROCHLORIDE 12.5 MG/1
12.5 TABLET ORAL EVERY 6 HOURS PRN
Status: DISCONTINUED | OUTPATIENT
Start: 2020-01-01 | End: 2020-01-01 | Stop reason: HOSPADM

## 2020-01-01 RX ORDER — ONDANSETRON 2 MG/ML
4 INJECTION INTRAMUSCULAR; INTRAVENOUS ONCE
Status: COMPLETED | OUTPATIENT
Start: 2020-01-01 | End: 2020-01-01

## 2020-01-01 RX ORDER — INSULIN GLARGINE 100 [IU]/ML
INJECTION, SOLUTION SUBCUTANEOUS
Qty: 6 PEN | Refills: 3 | Status: SHIPPED | OUTPATIENT
Start: 2020-01-01 | End: 2020-01-01 | Stop reason: SDUPTHER

## 2020-01-01 RX ORDER — ALBUTEROL SULFATE 2.5 MG/3ML
2.5 SOLUTION RESPIRATORY (INHALATION)
Status: DISCONTINUED | OUTPATIENT
Start: 2020-01-01 | End: 2020-01-01

## 2020-01-01 RX ORDER — ATORVASTATIN CALCIUM 10 MG/1
10 TABLET, FILM COATED ORAL NIGHTLY
Status: DISCONTINUED | OUTPATIENT
Start: 2020-01-01 | End: 2020-01-01 | Stop reason: HOSPADM

## 2020-01-01 RX ORDER — SODIUM CHLORIDE 0.9 % (FLUSH) 0.9 %
10 SYRINGE (ML) INJECTION EVERY 12 HOURS SCHEDULED
Status: DISCONTINUED | OUTPATIENT
Start: 2020-01-01 | End: 2020-01-01 | Stop reason: HOSPADM

## 2020-01-01 RX ORDER — METRONIDAZOLE 250 MG/1
250 TABLET ORAL 2 TIMES DAILY
Qty: 20 TABLET | Refills: 0 | Status: SHIPPED | OUTPATIENT
Start: 2020-01-01 | End: 2020-01-01

## 2020-01-01 RX ORDER — ISOSORBIDE DINITRATE 5 MG/1
TABLET ORAL
Qty: 540 TABLET | Refills: 3 | Status: SHIPPED | OUTPATIENT
Start: 2020-01-01

## 2020-01-01 RX ORDER — SODIUM CHLORIDE 0.9 % (FLUSH) 0.9 %
10 SYRINGE (ML) INJECTION PRN
Status: DISCONTINUED | OUTPATIENT
Start: 2020-01-01 | End: 2020-01-01 | Stop reason: HOSPADM

## 2020-01-01 RX ORDER — EZETIMIBE 10 MG/1
TABLET ORAL
Qty: 90 TABLET | Refills: 3 | Status: SHIPPED | OUTPATIENT
Start: 2020-01-01

## 2020-01-01 RX ORDER — ESCITALOPRAM OXALATE 10 MG/1
10 TABLET ORAL DAILY
Qty: 30 TABLET | Refills: 3 | Status: SHIPPED | OUTPATIENT
Start: 2020-01-01

## 2020-01-01 RX ORDER — NITROGLYCERIN 0.4 MG/1
0.4 TABLET SUBLINGUAL EVERY 5 MIN PRN
Status: DISCONTINUED | OUTPATIENT
Start: 2020-01-01 | End: 2020-01-01 | Stop reason: HOSPADM

## 2020-01-01 RX ORDER — CIPROFLOXACIN 2 MG/ML
400 INJECTION, SOLUTION INTRAVENOUS EVERY 12 HOURS
Status: DISCONTINUED | OUTPATIENT
Start: 2020-01-01 | End: 2020-01-01 | Stop reason: HOSPADM

## 2020-01-01 RX ORDER — CARVEDILOL 3.12 MG/1
TABLET ORAL
Qty: 180 TABLET | Refills: 3 | Status: SHIPPED | OUTPATIENT
Start: 2020-01-01

## 2020-01-01 RX ORDER — ACETAMINOPHEN 650 MG/1
650 SUPPOSITORY RECTAL EVERY 6 HOURS PRN
Status: DISCONTINUED | OUTPATIENT
Start: 2020-01-01 | End: 2020-01-01 | Stop reason: HOSPADM

## 2020-01-01 RX ORDER — FUROSEMIDE 40 MG/1
TABLET ORAL
Qty: 90 TABLET | Refills: 3 | Status: SHIPPED | OUTPATIENT
Start: 2020-01-01

## 2020-01-01 RX ORDER — DEXTROSE MONOHYDRATE 25 G/50ML
12.5 INJECTION, SOLUTION INTRAVENOUS PRN
Status: DISCONTINUED | OUTPATIENT
Start: 2020-01-01 | End: 2020-01-01 | Stop reason: HOSPADM

## 2020-01-01 RX ORDER — EZETIMIBE 10 MG/1
10 TABLET ORAL DAILY
Status: DISCONTINUED | OUTPATIENT
Start: 2020-01-01 | End: 2020-01-01 | Stop reason: HOSPADM

## 2020-01-01 RX ADMIN — METRONIDAZOLE 500 MG: 500 INJECTION, SOLUTION INTRAVENOUS at 03:47

## 2020-01-01 RX ADMIN — INSULIN LISPRO 3 UNITS: 100 INJECTION, SOLUTION INTRAVENOUS; SUBCUTANEOUS at 09:25

## 2020-01-01 RX ADMIN — SODIUM CHLORIDE, PRESERVATIVE FREE 10 ML: 5 INJECTION INTRAVENOUS at 09:29

## 2020-01-01 RX ADMIN — SODIUM CHLORIDE 500 ML: 9 INJECTION, SOLUTION INTRAVENOUS at 14:52

## 2020-01-01 RX ADMIN — Medication 10 UNITS: at 09:32

## 2020-01-01 RX ADMIN — METRONIDAZOLE 500 MG: 500 INJECTION, SOLUTION INTRAVENOUS at 18:23

## 2020-01-01 RX ADMIN — INSULIN LISPRO 18 UNITS: 100 INJECTION, SOLUTION INTRAVENOUS; SUBCUTANEOUS at 11:56

## 2020-01-01 RX ADMIN — SODIUM CHLORIDE, PRESERVATIVE FREE 10 ML: 5 INJECTION INTRAVENOUS at 09:22

## 2020-01-01 RX ADMIN — METRONIDAZOLE 500 MG: 500 INJECTION, SOLUTION INTRAVENOUS at 11:57

## 2020-01-01 RX ADMIN — Medication 10 UNITS: at 00:33

## 2020-01-01 RX ADMIN — APIXABAN 2.5 MG: 2.5 TABLET, FILM COATED ORAL at 00:06

## 2020-01-01 RX ADMIN — EZETIMIBE 10 MG: 10 TABLET ORAL at 09:35

## 2020-01-01 RX ADMIN — METRONIDAZOLE 500 MG: 500 INJECTION, SOLUTION INTRAVENOUS at 13:09

## 2020-01-01 RX ADMIN — TIMOLOL MALEATE 1 DROP: 5 SOLUTION/ DROPS OPHTHALMIC at 09:33

## 2020-01-01 RX ADMIN — METRONIDAZOLE 500 MG: 500 INJECTION, SOLUTION INTRAVENOUS at 18:48

## 2020-01-01 RX ADMIN — ATORVASTATIN CALCIUM 10 MG: 10 TABLET, FILM COATED ORAL at 00:06

## 2020-01-01 RX ADMIN — Medication 10 UNITS: at 21:33

## 2020-01-01 RX ADMIN — INSULIN LISPRO 9 UNITS: 100 INJECTION, SOLUTION INTRAVENOUS; SUBCUTANEOUS at 08:23

## 2020-01-01 RX ADMIN — ATORVASTATIN CALCIUM 10 MG: 10 TABLET, FILM COATED ORAL at 20:22

## 2020-01-01 RX ADMIN — TIMOLOL MALEATE 1 DROP: 5 SOLUTION/ DROPS OPHTHALMIC at 00:06

## 2020-01-01 RX ADMIN — INSULIN LISPRO 2 UNITS: 100 INJECTION, SOLUTION INTRAVENOUS; SUBCUTANEOUS at 00:33

## 2020-01-01 RX ADMIN — APIXABAN 2.5 MG: 2.5 TABLET, FILM COATED ORAL at 20:22

## 2020-01-01 RX ADMIN — INSULIN LISPRO 12 UNITS: 100 INJECTION, SOLUTION INTRAVENOUS; SUBCUTANEOUS at 13:10

## 2020-01-01 RX ADMIN — METRONIDAZOLE 500 MG: 500 INJECTION, SOLUTION INTRAVENOUS at 03:43

## 2020-01-01 RX ADMIN — APIXABAN 2.5 MG: 2.5 TABLET, FILM COATED ORAL at 21:32

## 2020-01-01 RX ADMIN — INSULIN LISPRO 5 UNITS: 100 INJECTION, SOLUTION INTRAVENOUS; SUBCUTANEOUS at 20:22

## 2020-01-01 RX ADMIN — INSULIN LISPRO 6 UNITS: 100 INJECTION, SOLUTION INTRAVENOUS; SUBCUTANEOUS at 18:44

## 2020-01-01 RX ADMIN — Medication 10 UNITS: at 09:26

## 2020-01-01 RX ADMIN — METRONIDAZOLE 500 MG: 500 INJECTION, SOLUTION INTRAVENOUS at 03:55

## 2020-01-01 RX ADMIN — CIPROFLOXACIN 400 MG: 2 INJECTION, SOLUTION INTRAVENOUS at 10:25

## 2020-01-01 RX ADMIN — ACETAMINOPHEN 650 MG: 325 TABLET ORAL at 06:13

## 2020-01-01 RX ADMIN — CIPROFLOXACIN 400 MG: 2 INJECTION, SOLUTION INTRAVENOUS at 11:21

## 2020-01-01 RX ADMIN — EZETIMIBE 10 MG: 10 TABLET ORAL at 08:21

## 2020-01-01 RX ADMIN — CIPROFLOXACIN 400 MG: 2 INJECTION, SOLUTION INTRAVENOUS at 21:55

## 2020-01-01 RX ADMIN — TIMOLOL MALEATE 1 DROP: 5 SOLUTION/ DROPS OPHTHALMIC at 20:28

## 2020-01-01 RX ADMIN — INSULIN LISPRO 6 UNITS: 100 INJECTION, SOLUTION INTRAVENOUS; SUBCUTANEOUS at 06:00

## 2020-01-01 RX ADMIN — ONDANSETRON HYDROCHLORIDE 4 MG: 2 SOLUTION INTRAMUSCULAR; INTRAVENOUS at 14:52

## 2020-01-01 RX ADMIN — SODIUM CHLORIDE: 9 INJECTION, SOLUTION INTRAVENOUS at 11:18

## 2020-01-01 RX ADMIN — APIXABAN 2.5 MG: 2.5 TABLET, FILM COATED ORAL at 08:21

## 2020-01-01 RX ADMIN — ATORVASTATIN CALCIUM 10 MG: 10 TABLET, FILM COATED ORAL at 21:32

## 2020-01-01 RX ADMIN — INSULIN LISPRO 9 UNITS: 100 INJECTION, SOLUTION INTRAVENOUS; SUBCUTANEOUS at 17:27

## 2020-01-01 RX ADMIN — SODIUM CHLORIDE, PRESERVATIVE FREE 10 ML: 5 INJECTION INTRAVENOUS at 00:07

## 2020-01-01 RX ADMIN — TIMOLOL MALEATE 1 DROP: 5 SOLUTION/ DROPS OPHTHALMIC at 09:29

## 2020-01-01 RX ADMIN — Medication 10 UNITS: at 08:23

## 2020-01-01 RX ADMIN — CIPROFLOXACIN 400 MG: 2 INJECTION, SOLUTION INTRAVENOUS at 21:32

## 2020-01-01 RX ADMIN — Medication 10 UNITS: at 20:22

## 2020-01-01 RX ADMIN — INSULIN LISPRO 15 UNITS: 100 INJECTION, SOLUTION INTRAVENOUS; SUBCUTANEOUS at 12:00

## 2020-01-01 RX ADMIN — APIXABAN 2.5 MG: 2.5 TABLET, FILM COATED ORAL at 09:35

## 2020-01-01 RX ADMIN — APIXABAN 2.5 MG: 2.5 TABLET, FILM COATED ORAL at 09:22

## 2020-01-01 RX ADMIN — INSULIN LISPRO 9 UNITS: 100 INJECTION, SOLUTION INTRAVENOUS; SUBCUTANEOUS at 09:31

## 2020-01-01 RX ADMIN — SODIUM CHLORIDE: 9 INJECTION, SOLUTION INTRAVENOUS at 00:03

## 2020-01-01 RX ADMIN — METRONIDAZOLE 500 MG: 500 INJECTION, SOLUTION INTRAVENOUS at 11:39

## 2020-01-01 RX ADMIN — CIPROFLOXACIN 400 MG: 2 INJECTION, SOLUTION INTRAVENOUS at 09:35

## 2020-01-01 RX ADMIN — TIMOLOL MALEATE 1 DROP: 5 SOLUTION/ DROPS OPHTHALMIC at 09:22

## 2020-01-01 RX ADMIN — CEFTRIAXONE 1 G: 1 INJECTION, POWDER, FOR SOLUTION INTRAMUSCULAR; INTRAVENOUS at 18:19

## 2020-01-01 RX ADMIN — EZETIMIBE 10 MG: 10 TABLET ORAL at 09:22

## 2020-01-01 RX ADMIN — INSULIN LISPRO 12 UNITS: 100 INJECTION, SOLUTION INTRAVENOUS; SUBCUTANEOUS at 17:28

## 2020-01-01 RX ADMIN — METRONIDAZOLE 500 MG: 500 INJECTION, SOLUTION INTRAVENOUS at 18:12

## 2020-01-01 RX ADMIN — MORPHINE SULFATE 2 MG: 4 INJECTION, SOLUTION INTRAMUSCULAR; INTRAVENOUS at 17:39

## 2020-01-01 ASSESSMENT — ENCOUNTER SYMPTOMS
NAUSEA: 0
ABDOMINAL PAIN: 0
SINUS PAIN: 0
EYE PAIN: 0
FACIAL SWELLING: 0
ABDOMINAL PAIN: 0
SHORTNESS OF BREATH: 0
ABDOMINAL PAIN: 0
WHEEZING: 0
BACK PAIN: 0
SORE THROAT: 0
NAUSEA: 0
COUGH: 0
SORE THROAT: 0
BLOOD IN STOOL: 0
SORE THROAT: 0
SINUS PAIN: 0
ABDOMINAL PAIN: 0
SHORTNESS OF BREATH: 0
PHOTOPHOBIA: 0
VOMITING: 1
COUGH: 0
DIARRHEA: 0
WHEEZING: 0
TROUBLE SWALLOWING: 0
BLOOD IN STOOL: 0
VOMITING: 0
EYE ITCHING: 0
COUGH: 1
EYE REDNESS: 0
CHEST TIGHTNESS: 0
EYE ITCHING: 0
EYE PAIN: 0
EYE DISCHARGE: 0
NAUSEA: 0
NAUSEA: 1
SINUS PRESSURE: 0
ABDOMINAL PAIN: 0
SHORTNESS OF BREATH: 0
EYE ITCHING: 0
ABDOMINAL DISTENTION: 0
CHEST TIGHTNESS: 0
SHORTNESS OF BREATH: 0
BACK PAIN: 0
VOMITING: 0
EYE DISCHARGE: 0
SORE THROAT: 0
WHEEZING: 0
SINUS PRESSURE: 0
EYE DISCHARGE: 0
ABDOMINAL DISTENTION: 0
TROUBLE SWALLOWING: 0
SINUS PRESSURE: 0
WHEEZING: 0
EYE ITCHING: 0
SHORTNESS OF BREATH: 0
ABDOMINAL DISTENTION: 0
EYE ITCHING: 0
PHOTOPHOBIA: 0
COUGH: 1
CHEST TIGHTNESS: 0
ABDOMINAL DISTENTION: 0
DIARRHEA: 0
NAUSEA: 0
APNEA: 0
SINUS PRESSURE: 0
WHEEZING: 0
NAUSEA: 0
VOMITING: 0
PHOTOPHOBIA: 0
COLOR CHANGE: 0
BACK PAIN: 0
VOMITING: 0
VOMITING: 0
SHORTNESS OF BREATH: 0

## 2020-01-01 ASSESSMENT — PAIN DESCRIPTION - ORIENTATION
ORIENTATION: LEFT
ORIENTATION: LOWER;LEFT
ORIENTATION: RIGHT;LEFT

## 2020-01-01 ASSESSMENT — PAIN DESCRIPTION - PAIN TYPE
TYPE: ACUTE PAIN
TYPE: ACUTE PAIN

## 2020-01-01 ASSESSMENT — PAIN DESCRIPTION - LOCATION
LOCATION: LEG

## 2020-01-01 ASSESSMENT — PAIN SCALES - WONG BAKER
WONGBAKER_NUMERICALRESPONSE: 8
WONGBAKER_NUMERICALRESPONSE: 8

## 2020-01-01 ASSESSMENT — PAIN SCALES - GENERAL
PAINLEVEL_OUTOF10: 0
PAINLEVEL_OUTOF10: 0
PAINLEVEL_OUTOF10: 2
PAINLEVEL_OUTOF10: 5
PAINLEVEL_OUTOF10: 5

## 2020-01-01 ASSESSMENT — PAIN - FUNCTIONAL ASSESSMENT: PAIN_FUNCTIONAL_ASSESSMENT: PREVENTS OR INTERFERES WITH ALL ACTIVE AND SOME PASSIVE ACTIVITIES

## 2020-01-01 ASSESSMENT — PAIN DESCRIPTION - DESCRIPTORS
DESCRIPTORS: SORE;TENDER
DESCRIPTORS: SORE;TENDER

## 2020-01-10 PROCEDURE — 99999 PR OFFICE/OUTPT VISIT,PROCEDURE ONLY: CPT | Performed by: CLINICAL NURSE SPECIALIST

## 2020-01-28 ENCOUNTER — OFFICE VISIT (OUTPATIENT)
Dept: CARDIOLOGY | Age: 85
End: 2020-01-28
Payer: MEDICARE

## 2020-01-28 ENCOUNTER — OFFICE VISIT (OUTPATIENT)
Dept: INTERNAL MEDICINE | Age: 85
End: 2020-01-28
Payer: MEDICARE

## 2020-01-28 VITALS
BODY MASS INDEX: 35.63 KG/M2 | WEIGHT: 227 LBS | SYSTOLIC BLOOD PRESSURE: 136 MMHG | HEART RATE: 63 BPM | HEIGHT: 67 IN | DIASTOLIC BLOOD PRESSURE: 68 MMHG

## 2020-01-28 VITALS — DIASTOLIC BLOOD PRESSURE: 60 MMHG | SYSTOLIC BLOOD PRESSURE: 124 MMHG

## 2020-01-28 PROBLEM — Z86.711 HISTORY OF PULMONARY EMBOLUS (PE): Status: ACTIVE | Noted: 2020-01-28

## 2020-01-28 PROBLEM — L30.9 CHRONIC DERMATITIS: Status: ACTIVE | Noted: 2020-01-28

## 2020-01-28 PROCEDURE — G8427 DOCREV CUR MEDS BY ELIG CLIN: HCPCS | Performed by: CLINICAL NURSE SPECIALIST

## 2020-01-28 PROCEDURE — 4040F PNEUMOC VAC/ADMIN/RCVD: CPT | Performed by: CLINICAL NURSE SPECIALIST

## 2020-01-28 PROCEDURE — G8417 CALC BMI ABV UP PARAM F/U: HCPCS | Performed by: INTERNAL MEDICINE

## 2020-01-28 PROCEDURE — G8417 CALC BMI ABV UP PARAM F/U: HCPCS | Performed by: CLINICAL NURSE SPECIALIST

## 2020-01-28 PROCEDURE — 4040F PNEUMOC VAC/ADMIN/RCVD: CPT | Performed by: INTERNAL MEDICINE

## 2020-01-28 PROCEDURE — 1036F TOBACCO NON-USER: CPT | Performed by: INTERNAL MEDICINE

## 2020-01-28 PROCEDURE — 1036F TOBACCO NON-USER: CPT | Performed by: CLINICAL NURSE SPECIALIST

## 2020-01-28 PROCEDURE — 1123F ACP DISCUSS/DSCN MKR DOCD: CPT | Performed by: INTERNAL MEDICINE

## 2020-01-28 PROCEDURE — G8484 FLU IMMUNIZE NO ADMIN: HCPCS | Performed by: INTERNAL MEDICINE

## 2020-01-28 PROCEDURE — 99213 OFFICE O/P EST LOW 20 MIN: CPT | Performed by: CLINICAL NURSE SPECIALIST

## 2020-01-28 PROCEDURE — 1123F ACP DISCUSS/DSCN MKR DOCD: CPT | Performed by: CLINICAL NURSE SPECIALIST

## 2020-01-28 PROCEDURE — 99214 OFFICE O/P EST MOD 30 MIN: CPT | Performed by: INTERNAL MEDICINE

## 2020-01-28 PROCEDURE — G8484 FLU IMMUNIZE NO ADMIN: HCPCS | Performed by: CLINICAL NURSE SPECIALIST

## 2020-01-28 PROCEDURE — G8427 DOCREV CUR MEDS BY ELIG CLIN: HCPCS | Performed by: INTERNAL MEDICINE

## 2020-01-28 PROCEDURE — 93280 PM DEVICE PROGR EVAL DUAL: CPT | Performed by: CLINICAL NURSE SPECIALIST

## 2020-01-28 RX ORDER — MINOCYCLINE HYDROCHLORIDE 100 MG/1
100 CAPSULE ORAL 2 TIMES DAILY
Status: ON HOLD | COMMUNITY
End: 2020-01-01 | Stop reason: HOSPADM

## 2020-01-28 RX ORDER — DOXYCYCLINE HYCLATE 100 MG/1
100 CAPSULE ORAL 2 TIMES DAILY
Qty: 20 CAPSULE | Refills: 0 | Status: SHIPPED | OUTPATIENT
Start: 2020-01-28 | End: 2020-02-07

## 2020-01-28 ASSESSMENT — ENCOUNTER SYMPTOMS
NAUSEA: 0
BACK PAIN: 0
EYE REDNESS: 0
BLOOD IN STOOL: 0
VOMITING: 0
SHORTNESS OF BREATH: 0
ABDOMINAL DISTENTION: 0
NAUSEA: 0
EYE ITCHING: 0
SORE THROAT: 0
COUGH: 0
FACIAL SWELLING: 0
SINUS PRESSURE: 0
VOMITING: 0
EYE DISCHARGE: 0
WHEEZING: 0
ABDOMINAL PAIN: 0
CHEST TIGHTNESS: 0
SHORTNESS OF BREATH: 0
ABDOMINAL PAIN: 0
TROUBLE SWALLOWING: 0
WHEEZING: 0

## 2020-01-28 NOTE — PROGRESS NOTES
hyclate (VIBRAMYCIN) 100 MG capsule Take 1 capsule by mouth 2 times daily for 10 days 20 capsule 0    minocycline (MINOCIN;DYNACIN) 100 MG capsule Take 100 mg by mouth 2 times daily      Sodium Hypochlorite 0.0125 % SOLN Apply topically      ELIQUIS 2.5 MG TABS tablet TAKE 1 TABLET TWICE A  tablet 4    LANTUS 100 UNIT/ML injection vial INJECT 40 UNITS UNDER THE SKIN AT BEDTIME 50 mL 3    nystatin (MYCOSTATIN) 927862 UNIT/GM cream Apply topically 2 times daily. 1 Tube 5    nystatin (MYCOSTATIN) 477948 UNIT/GM powder Twice  dly 60 g 5    amLODIPine (NORVASC) 5 MG tablet TAKE 1 TABLET DAILY (MUST KEEP FOLLOW UP FOR ADDITIONAL REFILLS) 90 tablet 4    furosemide (LASIX) 40 MG tablet TAKE 1 TABLET DAILY 90 tablet 4    atorvastatin (LIPITOR) 10 MG tablet TAKE 1 TABLET DAILY AT BEDTIME 90 tablet 4    carvedilol (COREG) 3.125 MG tablet TAKE 1 TABLET TWICE A  tablet 4    ezetimibe (ZETIA) 10 MG tablet TAKE 1 TABLET DAILY 90 tablet 4    spironolactone (ALDACTONE) 25 MG tablet TAKE 1 TABLET DAILY 90 tablet 3    isosorbide dinitrate (ISORDIL) 5 MG tablet TAKE 3 TABLETS TWICE A  tablet 4    Bismuth Tribromoph-Petrolatum (XEROFORM PETROLATUM GAUZE 5\"X9\") MISC external pads Apply 1 each topically every other day 15 each 0    polyethylene glycol (GLYCOLAX) powder Take 17 g by mouth daily 1 Bottle 5    insulin lispro (HUMALOG) 100 UNIT/ML injection vial As directed per sliding scale 6 vial 3    B-D INS SYR ULTRAFINE 1CC/31G 31G X 5/16\" 1 ML MISC       timolol (TIMOPTIC) 0.5 % ophthalmic solution Place 1 drop into both eyes 2 times daily       Coenzyme Q10 (COQ10 PO) Take 10 mg by mouth nightly      Garlic 10 MG CAPS Take 1 capsule by mouth daily Pt unsure of dose. States it changes because he gets whatever is on sale.       Multiple Vitamins-Minerals (THERAPEUTIC MULTIVITAMIN-MINERALS) tablet Take 1 tablet by mouth daily      meclizine (ANTIVERT) 12.5 MG tablet Take 12.5 mg by mouth 2 times of motion and neck supple. Thyroid: No thyromegaly. Vascular: No JVD. Trachea: No tracheal deviation. Cardiovascular:      Rate and Rhythm: Normal rate and regular rhythm. Heart sounds: Normal heart sounds. No murmur. No friction rub. No gallop. Pulmonary:      Effort: Pulmonary effort is normal. No respiratory distress. Breath sounds: Normal breath sounds. No wheezing or rales. Abdominal:      General: Bowel sounds are normal. There is no distension. Palpations: Abdomen is soft. There is no mass. Tenderness: There is no abdominal tenderness. There is no guarding or rebound. Musculoskeletal: Normal range of motion. General: No tenderness or deformity. Right lower leg: Edema present. Left lower leg: Edema present. Lymphadenopathy:      Cervical: No cervical adenopathy. Skin:     General: Skin is warm and dry. Coloration: Skin is not pale. Findings: No erythema or rash. Comments: Yeast infection in all the inguinal folds with excoriation and is well as penile and scrotal involvement   Neurological:      Mental Status: He is alert and oriented to person, place, and time. Cranial Nerves: No cranial nerve deficit. Motor: No abnormal muscle tone. Coordination: Coordination normal.      Deep Tendon Reflexes: Reflexes are normal and symmetric. Reflexes normal.   Psychiatric:         Behavior: Behavior normal.         Thought Content: Thought content normal.         Judgment: Judgment normal.          Assessment:      Diagnosis Orders   1. 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, with long-term current use of insulin ([facility])  Comprehensive Metabolic Panel    Hemoglobin A1C   2. Essential hypertension  Comprehensive Metabolic Panel    Hemoglobin A1C   3. Chronic dermatitis  Comprehensive Metabolic Panel    Hemoglobin A1C            Plan:     2 diabetes mellitus which is less than optimally control.   He is trying to do better with

## 2020-01-28 NOTE — PROGRESS NOTES
[facility], [address], [address] [address]  Phone: [phone]  Fax: [phone]    OFFICE VISIT:  2020    Annabella Graham - : 1925    Reason For Visit:  Glenn Bauman is a 80 y.o. male who is here for Coronary Artery Disease (No cardiac symptoms today. ) and Ambulatory Cardiac Monitoring    HPI   The patient is here follow-up with a history of CAD, diastolic heart failure, and a pacemaker. Patient has a history of a pulmonary embolus . He currently resides in an assisted living facility, [facility].    He reports the left leg is always larger than the right and he has chronic lower extremity edema. His son says he has had some recent problems with cellulitis and has a PCP appointment next. He denies any chest pain, palpitations, orthopnea, PND. He denies chest pain, unusual dyspnea, orthopnea, PND, or palpitations. He states is wife  recently. aMdina Neves MD is PCP.   Annabella Graham has the following [facility] as [facility] in [facility]:    Patient Active Problem List    Diagnosis Date Noted    History of pulmonary embolus ([place]) 2020    Chronic dermatitis 2020    Bunion of great toe of right foot 2019    Pneumonia 2019    Seizures ([address]     Hypertension     Diastolic heart failure ([address]     Diabetes mellitus ([address]     CAD (coronary artery disease)     Diabetic ulcer of left midfoot associated with type 2 diabetes mellitus, with fat layer exposed ([address] 2018    Palliative care patient 2018    Diabetic foot infection ([address] 10/31/2018    Chronic fatigue 10/18/2018    History of prostate cancer 08/15/2018    Male stress incontinence 08/15/2018    Sick sinus syndrome ([address]     Pacemaker     Mobitz type 2 second degree heart block     Chronic diastolic congestive heart failure ([address] 05/10/2016    Obesity due to excess calories 05/10/2016    Coronary artery disease involving native coronary mL 3    nystatin (MYCOSTATIN) 849916 UNIT/GM cream Apply topically 2 times daily. 1 Tube 5    nystatin (MYCOSTATIN) 510806 UNIT/GM powder Twice  dly 60 g 5    amLODIPine (NORVASC) 5 MG tablet TAKE 1 TABLET DAILY (MUST KEEP FOLLOW UP FOR ADDITIONAL REFILLS) 90 tablet 4    furosemide (LASIX) 40 MG tablet TAKE 1 TABLET DAILY 90 tablet 4    atorvastatin (LIPITOR) 10 MG tablet TAKE 1 TABLET DAILY AT BEDTIME 90 tablet 4    carvedilol (COREG) 3.125 MG tablet TAKE 1 TABLET TWICE A  tablet 4    ezetimibe (ZETIA) 10 MG tablet TAKE 1 TABLET DAILY 90 tablet 4    spironolactone (ALDACTONE) 25 MG tablet TAKE 1 TABLET DAILY 90 tablet 3    isosorbide dinitrate (ISORDIL) 5 MG tablet TAKE 3 TABLETS TWICE A  tablet 4    Bismuth Tribromoph-Petrolatum (XEROFORM PETROLATUM GAUZE 5\"X9\") MISC external pads Apply 1 each topically every other day 15 each 0    polyethylene glycol (GLYCOLAX) powder Take 17 g by mouth daily 1 Bottle 5    insulin lispro (HUMALOG) 100 UNIT/ML injection vial As directed per sliding scale 6 vial 3    B-D INS SYR ULTRAFINE 1CC/31G 31G X 5/16\" 1 ML MISC       timolol (TIMOPTIC) 0.5 % ophthalmic solution Place 1 drop into both eyes 2 times daily       Coenzyme Q10 (COQ10 PO) Take 10 mg by mouth nightly      Garlic 10 MG CAPS Take 1 capsule by mouth daily Pt unsure of dose. States it changes because he gets whatever is on sale.  Multiple Vitamins-Minerals (THERAPEUTIC MULTIVITAMIN-MINERALS) tablet Take 1 tablet by mouth daily      meclizine (ANTIVERT) 12.5 MG tablet Take 12.5 mg by mouth 2 times daily       nitroGLYCERIN (NITROSTAT) 0.4 MG SL tablet Place 0.4 mg under the tongue every 5 minutes as needed for Chest pain      doxycycline hyclate (VIBRAMYCIN) 100 MG capsule Take 1 capsule by mouth 2 times daily for 10 days 20 capsule 0     No current facility-administered medications for this visit. Allergies: Patient has no known allergies.     Review of Systems  Review of Systems   Constitutional: Positive for fatigue. Negative for activity change, diaphoresis, fever and unexpected weight change. HENT: Negative for facial swelling and nosebleeds. Eyes: Negative for redness and visual disturbance. Respiratory: Negative for cough, chest tightness, shortness of breath and wheezing. Cardiovascular: Positive for leg swelling. Negative for chest pain and palpitations. Gastrointestinal: Negative for abdominal pain, nausea and vomiting. Endocrine: Negative for cold intolerance and heat intolerance. Genitourinary: Negative for dysuria and hematuria. Musculoskeletal: Negative for arthralgias and myalgias. Skin: Negative for pallor and rash. Neurological: Negative for dizziness, seizures, syncope, weakness and light-headedness. Hematological: Does not bruise/bleed easily. Psychiatric/Behavioral: Negative for agitation. The patient is not nervous/anxious. Objective  Vital Signs - /68   Pulse 63   Ht 5' 7\" (1.702 m)   Wt 227 lb (103 kg)   BMI 35.55 kg/m²    Wt Readings from Last 3 Encounters:   01/28/20 227 lb (103 kg)   12/02/19 233 lb (105.7 kg)   08/30/19 228 lb (103.4 kg)      Physical Exam  Vitals signs and nursing note reviewed. Constitutional:       General: He is not in acute distress. Appearance: He is well-developed. He is obese. Comments: Obese, advanced age, deconditioned   HENT:      Head: Normocephalic and atraumatic. Eyes:      General:         Right eye: No discharge. Left eye: No discharge. Pupils: Pupils are equal, round, and reactive to light. Neck:      Musculoskeletal: Neck supple. No muscular tenderness. Vascular: No JVD. Trachea: No tracheal deviation. Cardiovascular:      Rate and Rhythm: Normal rate and regular rhythm. Heart sounds: Normal heart sounds. No murmur. No friction rub. No gallop.        Comments: No carotid bruit  Pulmonary:      Effort: Pulmonary effort is normal. No

## 2020-02-17 ENCOUNTER — OFFICE VISIT (OUTPATIENT)
Dept: INTERNAL MEDICINE | Age: 85
End: 2020-02-17
Payer: MEDICARE

## 2020-02-17 VITALS
SYSTOLIC BLOOD PRESSURE: 128 MMHG | BODY MASS INDEX: 35.55 KG/M2 | OXYGEN SATURATION: 96 % | HEART RATE: 68 BPM | DIASTOLIC BLOOD PRESSURE: 60 MMHG | WEIGHT: 227 LBS

## 2020-02-17 PROCEDURE — G8484 FLU IMMUNIZE NO ADMIN: HCPCS | Performed by: INTERNAL MEDICINE

## 2020-02-17 PROCEDURE — G8427 DOCREV CUR MEDS BY ELIG CLIN: HCPCS | Performed by: INTERNAL MEDICINE

## 2020-02-17 PROCEDURE — 1123F ACP DISCUSS/DSCN MKR DOCD: CPT | Performed by: INTERNAL MEDICINE

## 2020-02-17 PROCEDURE — 99214 OFFICE O/P EST MOD 30 MIN: CPT | Performed by: INTERNAL MEDICINE

## 2020-02-17 PROCEDURE — 1036F TOBACCO NON-USER: CPT | Performed by: INTERNAL MEDICINE

## 2020-02-17 PROCEDURE — G8417 CALC BMI ABV UP PARAM F/U: HCPCS | Performed by: INTERNAL MEDICINE

## 2020-02-17 PROCEDURE — 4040F PNEUMOC VAC/ADMIN/RCVD: CPT | Performed by: INTERNAL MEDICINE

## 2020-02-17 ASSESSMENT — PATIENT HEALTH QUESTIONNAIRE - PHQ9
SUM OF ALL RESPONSES TO PHQ9 QUESTIONS 1 & 2: 0
SUM OF ALL RESPONSES TO PHQ QUESTIONS 1-9: 0
SUM OF ALL RESPONSES TO PHQ QUESTIONS 1-9: 0
2. FEELING DOWN, DEPRESSED OR HOPELESS: 0
1. LITTLE INTEREST OR PLEASURE IN DOING THINGS: 0

## 2020-02-18 NOTE — TELEPHONE ENCOUNTER
Kimi Jansen called requesting a refill of the below medication which has been pended for you:     Requested Prescriptions     Pending Prescriptions Disp Refills    insulin lispro (HUMALOG) 100 UNIT/ML injection vial 6 vial 3     Sig: As directed per sliding scale       Last Appointment Date: 2/17/2020  Next Appointment Date: 3/2/2020    No Known Allergies

## 2020-02-25 DIAGNOSIS — L97.509 TYPE 2 DIABETES MELLITUS WITH FOOT ULCER, WITH LONG-TERM CURRENT USE OF INSULIN (HCC): Chronic | ICD-10-CM

## 2020-02-25 DIAGNOSIS — E11.621 TYPE 2 DIABETES MELLITUS WITH FOOT ULCER, WITH LONG-TERM CURRENT USE OF INSULIN (HCC): Chronic | ICD-10-CM

## 2020-02-25 DIAGNOSIS — I10 ESSENTIAL HYPERTENSION: ICD-10-CM

## 2020-02-25 DIAGNOSIS — L30.9 CHRONIC DERMATITIS: ICD-10-CM

## 2020-02-25 DIAGNOSIS — Z79.4 TYPE 2 DIABETES MELLITUS WITH FOOT ULCER, WITH LONG-TERM CURRENT USE OF INSULIN (HCC): Chronic | ICD-10-CM

## 2020-02-25 LAB
ALBUMIN SERPL-MCNC: 3.7 G/DL (ref 3.5–5.2)
ALP BLD-CCNC: 70 U/L (ref 40–130)
ALT SERPL-CCNC: 10 U/L (ref 5–41)
ANION GAP SERPL CALCULATED.3IONS-SCNC: 14 MMOL/L (ref 7–19)
AST SERPL-CCNC: 13 U/L (ref 5–40)
BILIRUB SERPL-MCNC: 0.4 MG/DL (ref 0.2–1.2)
BUN BLDV-MCNC: 38 MG/DL (ref 8–23)
CALCIUM SERPL-MCNC: 9 MG/DL (ref 8.2–9.6)
CHLORIDE BLD-SCNC: 104 MMOL/L (ref 98–111)
CO2: 23 MMOL/L (ref 22–29)
CREAT SERPL-MCNC: 1.5 MG/DL (ref 0.5–1.2)
GFR NON-AFRICAN AMERICAN: 43
GLUCOSE BLD-MCNC: 197 MG/DL (ref 74–109)
HBA1C MFR BLD: 9.9 % (ref 4–6)
POTASSIUM SERPL-SCNC: 4.8 MMOL/L (ref 3.5–5)
SODIUM BLD-SCNC: 141 MMOL/L (ref 136–145)
TOTAL PROTEIN: 7.4 G/DL (ref 6.6–8.7)

## 2020-03-02 ENCOUNTER — OFFICE VISIT (OUTPATIENT)
Dept: INTERNAL MEDICINE | Age: 85
End: 2020-03-02
Payer: MEDICARE

## 2020-03-02 VITALS — OXYGEN SATURATION: 98 % | DIASTOLIC BLOOD PRESSURE: 60 MMHG | SYSTOLIC BLOOD PRESSURE: 132 MMHG | HEART RATE: 68 BPM

## 2020-03-02 PROCEDURE — G8417 CALC BMI ABV UP PARAM F/U: HCPCS | Performed by: INTERNAL MEDICINE

## 2020-03-02 PROCEDURE — 1036F TOBACCO NON-USER: CPT | Performed by: INTERNAL MEDICINE

## 2020-03-02 PROCEDURE — G8427 DOCREV CUR MEDS BY ELIG CLIN: HCPCS | Performed by: INTERNAL MEDICINE

## 2020-03-02 PROCEDURE — G8482 FLU IMMUNIZE ORDER/ADMIN: HCPCS | Performed by: INTERNAL MEDICINE

## 2020-03-02 PROCEDURE — 1123F ACP DISCUSS/DSCN MKR DOCD: CPT | Performed by: INTERNAL MEDICINE

## 2020-03-02 PROCEDURE — 99214 OFFICE O/P EST MOD 30 MIN: CPT | Performed by: INTERNAL MEDICINE

## 2020-03-02 PROCEDURE — 4040F PNEUMOC VAC/ADMIN/RCVD: CPT | Performed by: INTERNAL MEDICINE

## 2020-03-02 ASSESSMENT — ENCOUNTER SYMPTOMS
TROUBLE SWALLOWING: 0
SORE THROAT: 0
WHEEZING: 0
EYE ITCHING: 0
VOMITING: 0
NAUSEA: 0
EYE DISCHARGE: 0
BACK PAIN: 0
ABDOMINAL PAIN: 0
ABDOMINAL DISTENTION: 0
SINUS PRESSURE: 0
SHORTNESS OF BREATH: 0
BLOOD IN STOOL: 0

## 2020-03-02 NOTE — PROGRESS NOTES
Sig Dispense Refill    zoster recombinant adjuvanted vaccine (SHINGRIX) 50 MCG/0.5ML SUSR injection Inject 0.5 mLs into the muscle See Admin Instructions 1 dose now and repeat in 2-6 months 1 each 1    insulin lispro (HUMALOG) 100 UNIT/ML injection vial As directed per sliding scale 6 vial 3    minocycline (MINOCIN;DYNACIN) 100 MG capsule Take 100 mg by mouth 2 times daily      Sodium Hypochlorite 0.0125 % SOLN Apply topically      ELIQUIS 2.5 MG TABS tablet TAKE 1 TABLET TWICE A  tablet 4    LANTUS 100 UNIT/ML injection vial INJECT 40 UNITS UNDER THE SKIN AT BEDTIME 50 mL 3    nystatin (MYCOSTATIN) 832367 UNIT/GM cream Apply topically 2 times daily. 1 Tube 5    nystatin (MYCOSTATIN) 143683 UNIT/GM powder Twice  dly 60 g 5    amLODIPine (NORVASC) 5 MG tablet TAKE 1 TABLET DAILY (MUST KEEP FOLLOW UP FOR ADDITIONAL REFILLS) 90 tablet 4    furosemide (LASIX) 40 MG tablet TAKE 1 TABLET DAILY 90 tablet 4    atorvastatin (LIPITOR) 10 MG tablet TAKE 1 TABLET DAILY AT BEDTIME 90 tablet 4    carvedilol (COREG) 3.125 MG tablet TAKE 1 TABLET TWICE A  tablet 4    ezetimibe (ZETIA) 10 MG tablet TAKE 1 TABLET DAILY 90 tablet 4    spironolactone (ALDACTONE) 25 MG tablet TAKE 1 TABLET DAILY 90 tablet 3    isosorbide dinitrate (ISORDIL) 5 MG tablet TAKE 3 TABLETS TWICE A  tablet 4    Bismuth Tribromoph-Petrolatum (XEROFORM PETROLATUM GAUZE 5\"X9\") MISC external pads Apply 1 each topically every other day 15 each 0    polyethylene glycol (GLYCOLAX) powder Take 17 g by mouth daily 1 Bottle 5    B-D INS SYR ULTRAFINE 1CC/31G 31G X 5/16\" 1 ML MISC       timolol (TIMOPTIC) 0.5 % ophthalmic solution Place 1 drop into both eyes 2 times daily       Coenzyme Q10 (COQ10 PO) Take 10 mg by mouth nightly      Garlic 10 MG CAPS Take 1 capsule by mouth daily Pt unsure of dose. States it changes because he gets whatever is on sale.       Multiple Vitamins-Minerals (THERAPEUTIC MULTIVITAMIN-MINERALS) tablet Take 1 tablet by mouth daily      meclizine (ANTIVERT) 12.5 MG tablet Take 12.5 mg by mouth 2 times daily       nitroGLYCERIN (NITROSTAT) 0.4 MG SL tablet Place 0.4 mg under the tongue every 5 minutes as needed for Chest pain       No current facility-administered medications for this visit. No Known Allergies      Subjective:     Review of Systems   Constitutional: Negative for activity change, appetite change, fatigue and fever. HENT: Negative for congestion, hearing loss, sinus pressure, sore throat and trouble swallowing. Eyes: Negative for discharge and itching. Respiratory: Negative for shortness of breath and wheezing. Cardiovascular: Positive for leg swelling. Negative for chest pain and palpitations. Gastrointestinal: Negative for abdominal distention, abdominal pain, blood in stool, nausea and vomiting. Endocrine: Negative for cold intolerance, heat intolerance and polydipsia. Genitourinary: Negative for flank pain, frequency, hematuria and urgency. Musculoskeletal: Negative for arthralgias, back pain and joint swelling. Skin: Negative for rash and wound. Allergic/Immunologic: Negative for environmental allergies and food allergies. Neurological: Negative for dizziness, tremors, syncope, weakness, numbness and headaches. Hematological: Negative for adenopathy. Psychiatric/Behavioral: Negative for agitation and hallucinations. The patient is nervous/anxious. Objective:      /60   Pulse 68   SpO2 98%    Physical Exam  Constitutional:       General: He is not in acute distress. Appearance: He is well-developed. He is not diaphoretic. HENT:      Head: Normocephalic and atraumatic. Right Ear: External ear normal.      Left Ear: External ear normal.      Nose: Nose normal.      Mouth/Throat:      Pharynx: No oropharyngeal exudate. Eyes:      General: No scleral icterus. Right eye: No discharge. Left eye: No discharge. Conjunctiva/sclera: Conjunctivae normal.      Pupils: Pupils are equal, round, and reactive to light. Neck:      Musculoskeletal: Normal range of motion and neck supple. Thyroid: No thyromegaly. Vascular: No JVD. Trachea: No tracheal deviation. Cardiovascular:      Rate and Rhythm: Normal rate and regular rhythm. Heart sounds: Normal heart sounds. No murmur. No friction rub. No gallop. Pulmonary:      Effort: Pulmonary effort is normal. No respiratory distress. Breath sounds: Normal breath sounds. No wheezing or rales. Abdominal:      General: Bowel sounds are normal. There is no distension. Palpations: Abdomen is soft. There is no mass. Tenderness: There is no abdominal tenderness. There is no guarding or rebound. Musculoskeletal: Normal range of motion. General: No tenderness or deformity. Lymphadenopathy:      Cervical: No cervical adenopathy. Skin:     General: Skin is warm and dry. Coloration: Skin is not pale. Findings: No erythema or rash. Neurological:      Mental Status: He is alert and oriented to person, place, and time. Cranial Nerves: No cranial nerve deficit. Motor: No abnormal muscle tone. Coordination: Coordination normal.      Deep Tendon Reflexes: Reflexes are normal and symmetric. Reflexes normal.   Psychiatric:         Behavior: Behavior normal.         Thought Content: Thought content normal.         Judgment: Judgment normal.          Assessment:      Diagnosis Orders   1. 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, with long-term current use of insulin ([facility])  Comprehensive Metabolic Panel    Hemoglobin A1C   2. Coronary artery disease involving native coronary artery of native heart without angina pectoris  Comprehensive Metabolic Panel    Hemoglobin A1C   3.  CKD (chronic kidney disease) stage 3, GFR 30-59 ml/min ([facility])  Comprehensive Metabolic Panel    Hemoglobin A1C            Plan:     Type 2 diabetes mellitus. His hemoglobin A1c is gone from 11.2 down to 9.9. His weight is down some and he is trying to exercise more and make better food choices. Coronary artery disease which is stable and he has had no further exacerbations of angina. Chronic kidney disease which is stabilized. His GFR in November was 37 and is the same now. This is been a result of his heart disease and his diabetes. The skin breakdown with a yeast infection on his peritoneal area is improved significantly with the diaper dope that were using. We are going to have him continue the same. Overall he stable. Regarding getting the shingles vaccine see him back again at his scheduled appointment in 3 months and I encouraged him the fact that he is doing better with his sugar. Have spent 25 minutes with patient today, 50% of the time is been spent counseling on cardiovascular risk factors, fall risk precautions, and immunizations,      Return in about 3 months (around 6/2/2020) for LAB [address]. Patient given educational materials- see patient instructions. Discussed use, benefit, and side effects of prescribedmedications. All patient questions answered. Pt voiced understanding. Reviewedhealth maintenance. Instructed to continue current medications, diet and exercise. Patient agreed with treatment plan. **This report has been created usingvoice recognition software. It may contain minor errors which are inherent in voicerecognition technology. **    Electronically signed by Edwin Agosto MD on 3/2/2020 at 3:33 PM

## 2020-04-28 NOTE — TELEPHONE ENCOUNTER
Mary Nugent called requesting a refill of the below medication which has been pended for you:     Requested Prescriptions     Pending Prescriptions Disp Refills    isosorbide dinitrate (ISORDIL) 5 MG tablet [Pharmacy Med Name: ISOSORBIDE DINIT TABS 5MG] 540 tablet 3     Sig: TAKE 3 TABLETS TWICE A DAY       Last Appointment Date: 3/2/2020  Next Appointment Date: 6/1/2020    No Known Allergies

## 2020-06-01 NOTE — PROGRESS NOTES
[facility] INTERNAL MEDICINE  [address] [address] [place] [address]  Dept: [phone]  Dept Fax: [phone]: [phone]      Visit Date: 6/1/2020    Jevon Parada a 80 y.o. male who presents today for:  Chief Complaint   Patient presents with    Follow-up    Leg Swelling     redness left leg         HPI:     Gumaro Almanzar is 80years old with diabetes and is in for 3-month check on his diabetes. He also has depression since his wife passed away late last year and he is having problems with cellulitis in his left lower extremity once again. He has chronic renal insufficiency and appears to be worsening and he sees nephrology this week as well.   Past Medical History:   Diagnosis Date    Arthritis     CAD (coronary artery disease)     Cancer ([address]     CHF (congestive heart failure) (HCC)     CKD (chronic kidney disease) stage 3, GFR 30-59 ml/min ([address] 5/10/2016    Colon cancer ([address]     Diabetes mellitus ([address]     Diastolic heart failure ([address]     H/O partial resection of colon     Hypertension     Pacemaker     Palliative care patient 11/02/2018    Pneumonia 1/14/2019    Prostate CA ([address]     Pure hypercholesterolemia 5/10/2016    Seizures (HCC)     Sick sinus syndrome ([address]     Skin cancer       Past Surgical History:   Procedure Laterality Date    CARDIAC SURGERY      CHOLECYSTECTOMY      COLECTOMY      COLON SURGERY      CO DEBRIDEMENT, SKIN, SUB-Q TISSUE,=<20 SQ CM Left 11/1/2018    INCISION AND DRAINAGE AND EXCISIONAL DEBRIDEMENT OF SKIN AND SUBCUTANEOUS TISSUE OF LEFT FOOT ABSCESS [id number] performed by Melissa Delgado MD at [facility] OR       Family History   Problem Relation Age of Onset    Cancer Mother     Other Father        Social History     Tobacco Use    Smoking status: Former Smoker    Smokeless tobacco: Never Used   Substance Use Topics    Alcohol use: No      Current Outpatient Medications   Medication Sig Dispense whatever is on sale.  Multiple Vitamins-Minerals (THERAPEUTIC MULTIVITAMIN-MINERALS) tablet Take 1 tablet by mouth daily      meclizine (ANTIVERT) 12.5 MG tablet Take 12.5 mg by mouth 2 times daily       nitroGLYCERIN (NITROSTAT) 0.4 MG SL tablet Place 0.4 mg under the tongue every 5 minutes as needed for Chest pain       No current facility-administered medications for this visit. No Known Allergies      Subjective:     Review of Systems    Objective:      /80   Pulse 62   Wt 227 lb (103 kg)   SpO2 97%   BMI 35.55 kg/m²    Physical Exam     Assessment:      Diagnosis Orders   1. 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 CKD (chronic kidney disease) stage 3, GFR 30-59 ml/min ([facility])  CBC Auto Differential    Comprehensive Metabolic Panel    Hemoglobin A1C    Lipid Panel   3. Chronic dermatitis  CBC Auto Differential    Comprehensive Metabolic Panel    Hemoglobin A1C    Lipid Panel            Plan:     2 diabetes mellitus. He is looking better. His hemoglobin A1c is gone from 9.9 down to 9.5. Back about a year ago it was over 11. He is trying to do better. He lives in assisted living facility and he does not have much help as far as getting guidance on dietary discretion but he is trying his best and doing better. Chronic kidney disease which is worsening. GFR is dropped to 38 creatinine is up to 1.7. He sees nephrology this week and he will take copies of his lab with him. Chronic dermatitis with cellulitis of the left lower extremity which is recurrent. I am going to go ahead and put him back on his doxycycline as he seems to do better when he is on it has been a while since his last bout. I will put him on 100 mg twice a day for 10 days. He is otherwise doing about the same. He does not seem as emotionally labile as he did on his last visit.

## 2020-07-21 NOTE — PROGRESS NOTES
[address] INTERNAL MEDICINE  [address] [address] [place] [address]  Dept: [phone]  Dept Fax: [phone]: [phone]      Visit Date: 7/21/2020    Carl parrish 80 y.o. male who presents today for:  Chief Complaint   Patient presents with    Diabetes     discuss the need for diabetic shoes,cellulitis stiil not completely resolved         HPI:   Mario Joyce is in for a diabetic foot exam.  He has been to the podiatrist and had his nails trimmed and his calluses trimmed and the podiatrist recommended diabetic shoes for him. He gave him a prescription for them and he is here for me to document the fact that he is needing the shoes.     Past Medical History:   Diagnosis Date    Arthritis     CAD (coronary artery disease)     Cancer ([address]     CHF (congestive heart failure) (HCC)     CKD (chronic kidney disease) stage 3, GFR 30-59 ml/min ([address] 5/10/2016    Colon cancer ([address]     Diabetes mellitus ([address]     Diastolic heart failure ([address]     H/O partial resection of colon     Hypertension     Pacemaker     Palliative care patient 11/02/2018    Pneumonia 1/14/2019    Prostate CA ([address]     Pure hypercholesterolemia 5/10/2016    Seizures (HCC)     Sick sinus syndrome ([address]     Skin cancer       Past Surgical History:   Procedure Laterality Date    CARDIAC SURGERY      CHOLECYSTECTOMY      COLECTOMY      COLON SURGERY      SC DEBRIDEMENT, SKIN, SUB-Q TISSUE,=<20 SQ CM Left 11/1/2018    INCISION AND DRAINAGE AND EXCISIONAL DEBRIDEMENT OF SKIN AND SUBCUTANEOUS TISSUE OF LEFT FOOT ABSCESS [id number] performed by Curtis Iniguez MD at [facility] OR       Family History   Problem Relation Age of Onset    Cancer Mother     Other Father        Social History     Tobacco Use    Smoking status: Former Smoker    Smokeless tobacco: Never Used   Substance Use Topics    Alcohol use: No      Current Outpatient Medications   Medication Sig Dispense Refill  isosorbide dinitrate (ISORDIL) 5 MG tablet TAKE 3 TABLETS TWICE A  tablet 3    zoster recombinant adjuvanted vaccine (SHINGRIX) 50 MCG/0.5ML SUSR injection Inject 0.5 mLs into the muscle See Admin Instructions 1 dose now and repeat in 2-6 months 1 each 1    insulin lispro (HUMALOG) 100 UNIT/ML injection vial As directed per sliding scale 6 vial 3    minocycline (MINOCIN;DYNACIN) 100 MG capsule Take 100 mg by mouth 2 times daily      Sodium Hypochlorite 0.0125 % SOLN Apply topically      ELIQUIS 2.5 MG TABS tablet TAKE 1 TABLET TWICE A  tablet 4    LANTUS 100 UNIT/ML injection vial INJECT 40 UNITS UNDER THE SKIN AT BEDTIME 50 mL 3    nystatin (MYCOSTATIN) 378327 UNIT/GM cream Apply topically 2 times daily. 1 Tube 5    nystatin (MYCOSTATIN) 736878 UNIT/GM powder Twice  dly 60 g 5    amLODIPine (NORVASC) 5 MG tablet TAKE 1 TABLET DAILY (MUST KEEP FOLLOW UP FOR ADDITIONAL REFILLS) 90 tablet 4    furosemide (LASIX) 40 MG tablet TAKE 1 TABLET DAILY 90 tablet 4    atorvastatin (LIPITOR) 10 MG tablet TAKE 1 TABLET DAILY AT BEDTIME 90 tablet 4    carvedilol (COREG) 3.125 MG tablet TAKE 1 TABLET TWICE A  tablet 4    ezetimibe (ZETIA) 10 MG tablet TAKE 1 TABLET DAILY 90 tablet 4    spironolactone (ALDACTONE) 25 MG tablet TAKE 1 TABLET DAILY 90 tablet 3    Bismuth Tribromoph-Petrolatum (XEROFORM PETROLATUM GAUZE 5\"X9\") MISC external pads Apply 1 each topically every other day 15 each 0    polyethylene glycol (GLYCOLAX) powder Take 17 g by mouth daily 1 Bottle 5    B-D INS SYR ULTRAFINE 1CC/31G 31G X 5/16\" 1 ML MISC       timolol (TIMOPTIC) 0.5 % ophthalmic solution Place 1 drop into both eyes 2 times daily       Coenzyme Q10 (COQ10 PO) Take 10 mg by mouth nightly      Garlic 10 MG CAPS Take 1 capsule by mouth daily Pt unsure of dose. States it changes because he gets whatever is on sale.       Multiple Vitamins-Minerals (THERAPEUTIC MULTIVITAMIN-MINERALS) tablet Take 1 tablet by it is resolved and under good control. His toenails are well trimmed the knee. He does have some dry skin but no evidence of any skin breakdown and no evidence of any cracks or fissures in the underside of the soles. There is no deformities of the sole and no evidence of any source. I do agree that he needs diabetic shoes because all he is wearing around the assisted living facility or how shoes to give him no support. He would benefit from diabetic shoes. Return for Keep scheduled appointment. .     Patient given educational materials- see patient instructions. Discussed use, benefit, and side effects of prescribedmedications. All patient questions answered. Pt voiced understanding. Reviewedhealth maintenance. Instructed to continue current medications, diet and exercise. Patient agreed with treatment plan. **This report has been created usingvoice recognition software. It may contain minor errors which are inherent in voicerecognition technology. **    Electronically signed by Meño Ace MD on 7/21/2020 at 10:54 AM

## 2020-07-28 NOTE — PROGRESS NOTES
([facility]- [facility] [place])[facility] [address] [facility], [address], [address] [address]  Phone: [phone]  Fax: [phone]    OFFICE VISIT:  2020    Taty Hurst - : 1925    Reason For Visit:  Stephen Laws is a 80 y.o. male who is here for Coronary Artery Disease (No cardiac sx today. ) and Ambulatory Cardiac Monitoring    HPI   The patient is here follow-up with a history of CAD, diastolic heart failure, and a pacemaker. Patient has a history of a pulmonary embolus . He currently resides in an assisted living facility, [facility].    He reports the left leg is always larger than the right and he has chronic lower extremity edema. His son says he has continued issues with cellulitis and recently finished a round of antibiotics. He denies any chest pain, palpitations, orthopnea, PND. Conchis Grant MD is PCP.   Taty Hurst has the following history as recorded in [facility]:    Patient Active Problem List    Diagnosis Date Noted    History of pulmonary embolus (PE) 2020    Chronic dermatitis 2020    Bunion of great toe of right foot 2019    Pneumonia 2019    Hypertension     Diabetes mellitus ([address]     CAD (coronary artery disease)     Diabetic ulcer of left midfoot associated with type 2 diabetes mellitus, with fat layer exposed ([address] 2018    Palliative care patient 2018    Diabetic foot infection ([address] 10/31/2018    Chronic fatigue 10/18/2018    History of prostate cancer 08/15/2018    Male stress incontinence 08/15/2018    Pacemaker     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     Obesity due to excess calories 05/10/2016    Coronary artery disease involving native coronary artery without angina pectoris 05/10/2016    Glaucoma 05/10/2016    Pure hypercholesterolemia 05/10/2016    Benign prostatic hyperplasia 05/10/2016    CKD (chronic kidney disease) stage 3, GFR 30-59 ml/min ([facility]) 05/10/2016  Anemia in CKD (chronic kidney disease) 05/10/2016    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, with long-term current use of insulin (HCC)      Past Medical History:   Diagnosis Date    Arthritis     CAD (coronary artery disease)     Cancer ([address]     CHF (congestive heart failure) ([facility]     CKD (chronic kidney disease) stage 3, GFR 30-59 ml/min ([address] 5/10/2016    Colon cancer ([address]     Diabetes mellitus ([address]     Diastolic heart failure ([address]     H/O partial resection of colon     Hypertension     Pacemaker     Palliative care patient 11/02/2018    Pneumonia 1/14/2019    Prostate CA ([address]     Pure hypercholesterolemia 5/10/2016    Seizures ([address]     Sick sinus syndrome ([address]     Skin cancer      Past Surgical History:   Procedure Laterality Date    CARDIAC SURGERY      CHOLECYSTECTOMY      COLECTOMY      COLON SURGERY      NV DEBRIDEMENT, SKIN, SUB-Q TISSUE,=<20 SQ CM Left 11/1/2018    INCISION AND DRAINAGE AND EXCISIONAL DEBRIDEMENT OF SKIN AND SUBCUTANEOUS TISSUE OF LEFT FOOT ABSCESS 3.1H5P8ZL performed by Ruben Watters MD at [facility] OR     Family History   Problem Relation Age of Onset    Cancer Mother     Other Father      Social History     Tobacco Use    Smoking status: Former Smoker    Smokeless tobacco: Never Used   Substance Use Topics    Alcohol use: No      Current Outpatient Medications   Medication Sig Dispense Refill    isosorbide dinitrate (ISORDIL) 5 MG tablet TAKE 3 TABLETS TWICE A  tablet 3    zoster recombinant adjuvanted vaccine (SHINGRIX) 50 MCG/0.5ML SUSR injection Inject 0.5 mLs into the muscle See Admin Instructions 1 dose now and repeat in 2-6 months 1 each 1    insulin lispro (HUMALOG) 100 UNIT/ML injection vial As directed per sliding scale 6 vial 3    minocycline (MINOCIN;DYNACIN) 100 MG capsule Take 100 mg by mouth 2 times daily      Sodium Hypochlorite 0.0125 % SOLN Apply topically      ELIQUIS 2.5 MG TABS tablet TAKE 1 TABLET TWICE A DAY 180 tablet 4    LANTUS 100 UNIT/ML injection vial INJECT 40 UNITS UNDER THE SKIN AT BEDTIME 50 mL 3    nystatin (MYCOSTATIN) 886135 UNIT/GM cream Apply topically 2 times daily. 1 Tube 5    nystatin (MYCOSTATIN) 129323 UNIT/GM powder Twice  dly 60 g 5    amLODIPine (NORVASC) 5 MG tablet TAKE 1 TABLET DAILY (MUST KEEP FOLLOW UP FOR ADDITIONAL REFILLS) 90 tablet 4    furosemide (LASIX) 40 MG tablet TAKE 1 TABLET DAILY 90 tablet 4    atorvastatin (LIPITOR) 10 MG tablet TAKE 1 TABLET DAILY AT BEDTIME 90 tablet 4    carvedilol (COREG) 3.125 MG tablet TAKE 1 TABLET TWICE A  tablet 4    ezetimibe (ZETIA) 10 MG tablet TAKE 1 TABLET DAILY 90 tablet 4    spironolactone (ALDACTONE) 25 MG tablet TAKE 1 TABLET DAILY 90 tablet 3    Bismuth Tribromoph-Petrolatum (XEROFORM PETROLATUM GAUZE 5\"X9\") MISC external pads Apply 1 each topically every other day 15 each 0    polyethylene glycol (GLYCOLAX) powder Take 17 g by mouth daily 1 Bottle 5    B-D INS SYR ULTRAFINE 1CC/31G 31G X 5/16\" 1 ML MISC       timolol (TIMOPTIC) 0.5 % ophthalmic solution Place 1 drop into both eyes 2 times daily       Coenzyme Q10 (COQ10 PO) Take 10 mg by mouth nightly      Garlic 10 MG CAPS Take 1 capsule by mouth daily Pt unsure of dose. States it changes because he gets whatever is on sale.  Multiple Vitamins-Minerals (THERAPEUTIC MULTIVITAMIN-MINERALS) tablet Take 1 tablet by mouth daily      meclizine (ANTIVERT) 12.5 MG tablet Take 12.5 mg by mouth 2 times daily       nitroGLYCERIN (NITROSTAT) 0.4 MG SL tablet Place 0.4 mg under the tongue every 5 minutes as needed for Chest pain       No current facility-administered medications for this visit. Allergies: Patient has no known allergies. Review of Systems  Review of Systems   Constitutional: Positive for fatigue. Negative for activity change, diaphoresis, fever and unexpected weight change. HENT: Negative for facial swelling and nosebleeds.     Eyes: Negative for redness and visual disturbance. Respiratory: Negative for cough, chest tightness, shortness of breath and wheezing. Cardiovascular: Positive for leg swelling. Negative for chest pain and palpitations. Gastrointestinal: Negative for abdominal pain, nausea and vomiting. Endocrine: Negative for cold intolerance and heat intolerance. Genitourinary: Negative for dysuria and hematuria. Musculoskeletal: Negative for arthralgias and myalgias. Skin: Negative for pallor and rash. Neurological: Negative for dizziness, seizures, syncope, weakness and light-headedness. Hematological: Does not bruise/bleed easily. Psychiatric/Behavioral: Negative for agitation. The patient is not nervous/anxious. Objective  Vital Signs - BP (!) 124/50   Pulse 67   Ht 5' 7\" (1.702 m)   BMI 35.55 kg/m²    Wt Readings from Last 3 Encounters:   06/01/20 227 lb (103 kg)   02/17/20 227 lb (103 kg)   01/28/20 227 lb (103 kg)      Physical Exam  Vitals signs and nursing note reviewed. Constitutional:       General: He is not in acute distress. Appearance: He is well-developed. He is obese. Comments: Obese, advanced age, deconditioned   HENT:      Head: Normocephalic and atraumatic. Eyes:      General:         Right eye: No discharge. Left eye: No discharge. Pupils: Pupils are equal, round, and reactive to light. Neck:      Musculoskeletal: Neck supple. No muscular tenderness. Vascular: No JVD. Trachea: No tracheal deviation. Cardiovascular:      Rate and Rhythm: Normal rate and regular rhythm. Heart sounds: Normal heart sounds. No murmur. No friction rub. No gallop. Comments: No carotid bruit  Pulmonary:      Effort: Pulmonary effort is normal. No respiratory distress. Breath sounds: Normal breath sounds. No wheezing or rales. Abdominal:      Palpations: Abdomen is soft. Tenderness: There is no abdominal tenderness.    Musculoskeletal:         General: Swelling present. No deformity. Right lower leg: Edema (1+) present. Left lower leg: Edema (3+) present. Comments: In wheelchair   Skin:     General: Skin is warm and dry. Findings: No rash. Neurological:      Mental Status: He is alert and oriented to person, place, and time. Cranial Nerves: No cranial nerve deficit. Psychiatric:         Behavior: Behavior normal.         Judgment: Judgment normal.         Data:  Lab Results   Component Value Date     05/26/2020     06/02/2011    K 4.9 05/26/2020    K 4.2 08/24/2018    K 3.9 06/02/2011     05/26/2020     06/02/2011    CO2 23 05/26/2020    BUN 43 05/26/2020    CREATININE 1.7 05/26/2020    CREATININE 0.8 06/02/2011    GLUCOSE 161 05/26/2020    CALCIUM 9.5 05/26/2020      Lab Results   Component Value Date    CHOL 132 (L) 11/27/2019    TRIG 146 11/27/2019    HDL 34 (L) 11/27/2019    LDLCALC 69 11/27/2019     Lab Results   Component Value Date    ALT 9 05/26/2020    AST 12 05/26/2020     Assessment:     Diagnosis Orders   1. Coronary artery disease involving native coronary artery of native heart without angina pectoris     2. Chronic diastolic congestive heart failure ([address]     3. Essential hypertension     4. History of pulmonary embolus (PE)     5. Pacemaker     6. Complete heart block (HCC)       CAD-stable without symptoms of angina. Chronic diastolic heart failure- patient appears euvolemic. Weight is down from last visit. Continues beta-blocker, Lasix, spironolactone. Discussed the importance of reporting sudden weight gain of 3 pounds or more in 24 hours or 5 pounds in a week. Low-sodium diet. He does weigh regularly at the assisted living facility and states his weight is actually gone down recently    Hypertension-well controlled on current regimen    History of pulmonary embolus-in August 2018.   He has continued on Eliquis since that time     Complete heart block/ pacemaker  Pacemaker check showed adequate battery status @ 2.79 V or 7 years estimated  Mode: DDDR. Lead impedances are stable  Pacing:  AP 90%,  100%. Appropriate diagnostics and safety margins noted. Sustained arrythmia:  None  Reprogramming done for sensitivity and threshold testing. Please see the scanned interrogation report    Plan      Return in about 6 months (around 1/28/2021) for Dr Shane Miller. - establish care  Send Carelink 10/28/20    - Weigh daily and report weight gain of 3lbs or more in 24hrs or 5lbs in one week. - Call for increasing shortness of breath or increasing swelling in feet and legs. (This could mean you are retaining too much fluid)  - 2000mg low sodium diet  - Fluid restriction of 1500ml per day (about 6 cups of fluid per day)    Call with any questionsor concerns  Follow up with Martín cMcloud MD for non cardiac problems  Report any new problems  Cardiovascular Fitness-Exercise as tolerated. Strive for 15 minutes of exercise most days of the week. Cardiac / HealthyDiet  Continue current medications as directed  Continue plan of treatment  It is always recommended that you bring your medicationsbottles with you to each visit - this is for your safety!        SHRUTI Jaime

## 2020-07-28 NOTE — PATIENT INSTRUCTIONS
Return in about 6 months (around 1/28/2021) for Dr Andrzej Wang. - establish care  Send Carelink 10/28/20    - Weigh daily and report weight gain of 3lbs or more in 24hrs or 5lbs in one week. - Call for increasing shortness of breath or increasing swelling in feet and legs.     (This could mean you are retaining too much fluid)  - 2000mg low sodium diet  - Fluid restriction of 1500ml per day (about 6 cups of fluid per day)

## 2020-08-10 NOTE — TELEPHONE ENCOUNTER
Andry Cisneros called requesting a refill of the below medication which has been pended for you:     Requested Prescriptions     Pending Prescriptions Disp Refills    spironolactone (ALDACTONE) 25 MG tablet [Pharmacy Med Name: SPIRONOLACTONE TABS 25MG] 90 tablet 3     Sig: TAKE 1 TABLET DAILY       Last Appointment Date: 7/21/2020  Next Appointment Date: 9/1/2020    No Known Allergies

## 2020-09-01 NOTE — PROGRESS NOTES
[facility]  [address] [address] [address] [address]  Dept: [phone]  Dept Fax: [phone]: [phone]      Visit Date: 9/1/2020    Jackelin parrish 80 y.o. male who presents today for:  Chief Complaint   Patient presents with    Annual Exam         HPI:     80years old in for his annual exam.  He has type 2 diabetes mellitus as well as hypertension and heart disease history of congestive heart failure and kidney failure. He is having more episodes of confusion according to the son and some behavioral issues.     Past Medical History:   Diagnosis Date    Arthritis     CAD (coronary artery disease)     Cancer ([address]     CHF (congestive heart failure) (HCC)     CKD (chronic kidney disease) stage 3, GFR 30-59 ml/min ([address] 5/10/2016    Colon cancer ([address]     Diabetes mellitus ([address]     Diastolic heart failure ([address]     H/O partial resection of colon     Hypertension     Pacemaker     Palliative care patient 11/02/2018    Pneumonia 1/14/2019    Prostate CA ([address]     Pure hypercholesterolemia 5/10/2016    Seizures (HCC)     Sick sinus syndrome ([address]     Skin cancer       Past Surgical History:   Procedure Laterality Date    CARDIAC SURGERY      CHOLECYSTECTOMY      COLECTOMY      COLON SURGERY      [place] DEBRIDEMENT, SKIN, SUB-Q TISSUE,=<20 SQ CM Left 11/1/2018    INCISION AND DRAINAGE AND EXCISIONAL DEBRIDEMENT OF SKIN AND SUBCUTANEOUS TISSUE OF LEFT FOOT ABSCESS [id number] performed by Elizabeth Camacho MD at [facility] OR       Family History   Problem Relation Age of Onset    Cancer Mother     Other Father        Social History     Tobacco Use    Smoking status: Former Smoker    Smokeless tobacco: Never Used   Substance Use Topics    Alcohol use: No      Current Outpatient Medications   Medication Sig Dispense Refill    Insulin Pen Needle 32G X 4 MM MISC 1 each by Does not apply route daily 200 each 3    insulin glargine mouth daily      meclizine (ANTIVERT) 12.5 MG tablet Take 12.5 mg by mouth 2 times daily       nitroGLYCERIN (NITROSTAT) 0.4 MG SL tablet Place 0.4 mg under the tongue every 5 minutes as needed for Chest pain       No current facility-administered medications for this visit. No Known Allergies      Subjective:     Review of Systems   Constitutional: Positive for fatigue. Negative for activity change, appetite change and fever. HENT: Negative for congestion, hearing loss, sinus pressure, sore throat and trouble swallowing. Eyes: Negative for discharge and itching. Respiratory: Negative for shortness of breath and wheezing. Cardiovascular: Negative for chest pain, palpitations and leg swelling. Gastrointestinal: Negative for abdominal distention, abdominal pain, blood in stool, nausea and vomiting. Endocrine: Negative for cold intolerance, heat intolerance and polydipsia. Genitourinary: Negative for flank pain, frequency, hematuria and urgency. Musculoskeletal: Negative for arthralgias, back pain and joint swelling. Skin: Negative for rash and wound. Allergic/Immunologic: Negative for environmental allergies and food allergies. Neurological: Positive for weakness. Negative for dizziness, tremors, syncope, numbness and headaches. Hematological: Negative for adenopathy. Psychiatric/Behavioral: Positive for confusion. Negative for agitation and hallucinations. The patient is not nervous/anxious. Objective:      /60   Pulse 68   SpO2 98%    Physical Exam  Constitutional:       General: He is not in acute distress. Appearance: He is well-developed. He is not diaphoretic. HENT:      Head: Normocephalic and atraumatic. Right Ear: External ear normal.      Left Ear: External ear normal.      Nose: Nose normal.      Mouth/Throat:      Pharynx: No oropharyngeal exudate. Eyes:      General: No scleral icterus. Right eye: No discharge.          Left eye: No discharge. Conjunctiva/sclera: Conjunctivae normal.      Pupils: Pupils are equal, round, and reactive to light. Neck:      Musculoskeletal: Normal range of motion and neck supple. Thyroid: No thyromegaly. Vascular: No JVD. Trachea: No tracheal deviation. Cardiovascular:      Rate and Rhythm: Normal rate and regular rhythm. Heart sounds: Normal heart sounds. No murmur. No friction rub. No gallop. Pulmonary:      Effort: Pulmonary effort is normal. No respiratory distress. Breath sounds: Normal breath sounds. No wheezing or rales. Abdominal:      General: Bowel sounds are normal. There is no distension. Palpations: Abdomen is soft. There is no mass. Tenderness: There is no abdominal tenderness. There is no guarding or rebound. Musculoskeletal: Normal range of motion. General: No tenderness or deformity. Right lower leg: Edema present. Left lower leg: Edema present. Lymphadenopathy:      Cervical: No cervical adenopathy. Skin:     General: Skin is warm and dry. Coloration: Skin is not pale. Findings: No erythema or rash. Neurological:      Mental Status: He is alert. Mental status is at baseline. Cranial Nerves: No cranial nerve deficit. Motor: No abnormal muscle tone. Coordination: Coordination normal.      Deep Tendon Reflexes: Reflexes are normal and symmetric. Reflexes normal.          Assessment:      Diagnosis Orders   1. Type 2 diabetes mellitus without complication, with long-term current use of insulin ([facility])  Comprehensive Metabolic Panel    Hemoglobin A1C   2. Essential hypertension  Comprehensive Metabolic Panel    Hemoglobin A1C   3. Chronic fatigue  Comprehensive Metabolic Panel    Hemoglobin A1C   4. CKD (chronic kidney disease) stage 3, GFR 30-59 ml/min ([facility])  Comprehensive Metabolic Panel    Hemoglobin A1C            Plan:     Type 2 diabetes mellitus which is under poor control.   He has been eating a lot of watermelon and fruit and his A1c is now up to 10.1 we have talked at length in the past about how to get him on a better diet he with is at an assisted living facility and he has full free range of getting what ever he wants in the dining room and he has no concept of what he needs to eat or not to eat. I think this is 1 of those ongoing losing flores as they were never going to achieve because he continues to not understand what he should be doing. Essential hypertension. Blood pressure is 138/60 and his feet always show some edema but it is improved today over what it has been in the past.    Chronic fatigue. He has a history of congestive heart failure and his ejection fraction is low. He has a lot of edema and he is just not able to get around and ambulate much. Chronic kidney disease which is improved. GFR is gone from been in the 30s up to 43 now his creatinine is now so that is an improvement. Overall he appears to be declining. He is got some dementia and confusion and has been having some outbursts but with the fact that he gives himself medications is going to be very  difficult to present environment to have reassurance that he is taking medication as directed and I am fearful of giving him anything that is going to sedate him and make him fall. He is to keep his scheduled appointment I will see him back in 3 months. Have spent 25 minutes with patient today, 50% of the time is been spent counseling on cardiovascular risk factors, fall risk precautions, and immunizations,      Return in about 3 months (around 12/1/2020) for LAB 1 Suraj Melton. Patient given educational materials- see patient instructions. Discussed use, benefit, and side effects of prescribedmedications. All patient questions answered. Pt voiced understanding. Reviewedhealth maintenance. Instructed to continue current medications, diet and exercise. Patient agreed with treatment plan.      **This

## 2020-10-15 NOTE — TELEPHONE ENCOUNTER
Edu Frank called requesting a refill of the below medication which has been pended for you:     Requested Prescriptions     Pending Prescriptions Disp Refills    carvedilol (COREG) 3.125 MG tablet [Pharmacy Med Name: CARVEDILOL TABS 3.125MG] 180 tablet 3     Sig: TAKE 1 TABLET TWICE A DAY       Last Appointment Date: 9/1/2020  Next Appointment Date: 12/7/2020    No Known Allergies

## 2020-10-29 NOTE — TELEPHONE ENCOUNTER
Marco Marshall called requesting a refill of the below medication which has been pended for you:     Requested Prescriptions     Pending Prescriptions Disp Refills    furosemide (LASIX) 40 MG tablet [Pharmacy Med Name: FUROSEMIDE TABS 40MG] 90 tablet 3     Sig: TAKE 1 TABLET DAILY       Last Appointment Date: 9/1/2020  Next Appointment Date: 12/7/2020    No Known Allergies

## 2020-11-03 PROBLEM — I25.10 CAD (CORONARY ARTERY DISEASE): Status: RESOLVED | Noted: 2020-01-01 | Resolved: 2020-01-01

## 2020-11-16 NOTE — TELEPHONE ENCOUNTER
Milagro Hawikns called requesting a refill of the below medication which has been pended for you:     Requested Prescriptions     Pending Prescriptions Disp Refills    ezetimibe (ZETIA) 10 MG tablet [Pharmacy Med Name: EZETIMIBE TABS 10MG] 90 tablet 3     Sig: TAKE 1 TABLET DAILY       Last Appointment Date: 9/1/2020  Next Appointment Date: 12/7/2020    No Known Allergies

## 2020-12-01 NOTE — TELEPHONE ENCOUNTER
Heather Nava called requesting a refill of the below medication which has been pended for you:     Requested Prescriptions     Pending Prescriptions Disp Refills    amLODIPine (NORVASC) 5 MG tablet [Pharmacy Med Name: AMLODIPINE BESYLATE TABS 5MG] 90 tablet 3     Sig: TAKE 1 TABLET DAILY (MUST KEEP FOLLOW UP FOR ADDITIONAL REFILLS)       Last Appointment Date: 9/1/2020  Next Appointment Date: 12/7/2020    No Known Allergies

## 2020-12-04 NOTE — TELEPHONE ENCOUNTER
PTS DAUGHTER IS IN [place]OWN,SHE IS A PA. ASKING THAT YOU GIVE HIM PROZAC OR SOMETHING FOR DEPRESSION. STATES HE IS VERY DEPRESSED.

## 2020-12-04 NOTE — TELEPHONE ENCOUNTER
Voicemail: Misti Schuster called to follow up on a fax requesting letter of medical necessity and ov notes pertaining to an order pt requested.

## 2020-12-06 PROBLEM — N17.9 AKI (ACUTE KIDNEY INJURY) (HCC): Status: ACTIVE | Noted: 2020-01-01

## 2020-12-06 PROBLEM — K52.9 COLITIS: Status: ACTIVE | Noted: 2020-01-01

## 2020-12-06 NOTE — ED PROVIDER NOTES
Attending Supervisory Note/Shared Visit   I have personally performed a face to face diagnostic evaluation on this patient. I have reviewed the mid-levels findings and agree. Monie Castelan is a 79-year-old male presents to the emergency department for 1 episode of nausea and vomiting. Has stated while in the ER that he is felt better after his episode of emesis. He denies any abdominal pain cough fever or chills. Vital signs are stable, lungs overall clear abdomen soft and benign on my exam    Patient afebrile does have significant leukocytosis, x-ray concerning for possible pneumonia however he does deny cough, Covid negative, also does have concerning CT findings for possibly colitis. I initially had given him 1 g of Rocephin due to his leukocytosis and age concern that he was possibly septic and wanting to get some antibiotics going, due to him also having the underlying colitis have added Flagyl, will discuss with the hospitalist Dr. Luna White for admission and further work-up and evaluation 1903      FINAL IMPRESSION      1. Non-intractable vomiting with nausea, unspecified vomiting type    2. Colitis    3.  Pneumonia due to organism          Bhargav Whalen MD  Attending Emergency Physician        Cedric Mendez MD  12/06/20 0800

## 2020-12-06 NOTE — ED NOTES
Bed: 05  Expected date:   Expected time:   Means of arrival:   Comments:  smiley Crane  12/06/20 125

## 2020-12-06 NOTE — ED PROVIDER NOTES
Arthritis     CAD (coronary artery disease)     Cancer (HCC)     CHF (congestive heart failure) (HCC)     CKD (chronic kidney disease) stage 3, GFR 30-59 ml/min 5/10/2016    [facility]     [facility]     Diastolic heart failure (HCC)     H/O partial resection of colon     Hypertension     Pacemaker     Palliative care patient 11/02/2018    Pneumonia 1/14/2019    Prostate CA ([facility]     Pure hypercholesterolemia 5/10/2016    Seizures ([facility]     Sick sinus syndrome ([facility]     Skin cancer          SURGICAL HISTORY       Past Surgical History:   Procedure Laterality Date    CARDIAC SURGERY      CHOLECYSTECTOMY      COLECTOMY      COLON SURGERY      [place] DEBRIDEMENT, SKIN, SUB-Q TISSUE,=<20 SQ CM Left 11/1/2018    INCISION AND DRAINAGE AND EXCISIONAL DEBRIDEMENT OF SKIN AND SUBCUTANEOUS TISSUE OF LEFT FOOT ABSCESS 3.3V0E0IF performed by Alyce Jolley MD at [facility]       Previous Medications    AMLODIPINE (NORVASC) 5 MG TABLET    TAKE 1 TABLET DAILY (MUST KEEP FOLLOW UP FOR ADDITIONAL REFILLS)    ATORVASTATIN (LIPITOR) 10 MG TABLET    TAKE 1 TABLET DAILY AT BEDTIME    B-D INS SYR ULTRAFINE 1CC/31G 31G X 5/16\" 1 ML MISC        BISMUTH TRIBROMOPH-PETROLATUM (XEROFORM PETROLATUM GAUZE 5\"X9\") MISC EXTERNAL PADS    Apply 1 each topically every other day    CARVEDILOL (COREG) 3.125 MG TABLET    TAKE 1 TABLET TWICE A DAY    COENZYME Q10 (COQ10 PO)    Take 10 mg by mouth nightly    CONTINUOUS BLOOD GLUC SENSOR (FREESTYLE PEPITO 14 DAY SENSOR) MISC    APPLY EVERY 14 DAYS AS DIRECTED    ELIQUIS 2.5 MG TABS TABLET    TAKE 1 TABLET TWICE A DAY    ESCITALOPRAM (LEXAPRO) 10 MG TABLET    Take 1 tablet by mouth daily    EZETIMIBE (ZETIA) 10 MG TABLET    TAKE 1 TABLET DAILY    FUROSEMIDE (LASIX) 40 MG TABLET    TAKE 1 TABLET DAILY    GARLIC 10 MG CAPS    Take 1 capsule by mouth daily Pt unsure of dose. States it changes because he gets whatever is on sale.     INSULIN GLARGINE (LANTUS SOLOSTAR) 100 UNIT/ML INJECTION PEN    INJECT AS DIRECTED PER SLIDING SCALE    INSULIN LISPRO, 1 UNIT DIAL, (HUMALOG KWIKPEN) 100 UNIT/ML SOPN    INJECT AS DIRECTED PER SLIDING SCALE    INSULIN PEN NEEDLE 32G X 4 MM MISC    1 each by Does not apply route 4 times daily (before meals and nightly)    ISOSORBIDE DINITRATE (ISORDIL) 5 MG TABLET    TAKE 3 TABLETS TWICE A DAY    MECLIZINE (ANTIVERT) 12.5 MG TABLET    Take 12.5 mg by mouth 2 times daily     MINOCYCLINE (MINOCIN;DYNACIN) 100 MG CAPSULE    Take 100 mg by mouth 2 times daily    MULTIPLE VITAMINS-MINERALS (THERAPEUTIC MULTIVITAMIN-MINERALS) TABLET    Take 1 tablet by mouth daily    NITROGLYCERIN (NITROSTAT) 0.4 MG SL TABLET    Place 0.4 mg under the tongue every 5 minutes as needed for Chest pain    NYSTATIN (MYCOSTATIN) 158390 UNIT/GM CREAM    Apply topically 2 times daily. NYSTATIN (MYCOSTATIN) 127247 UNIT/GM POWDER    Twice  dly    POLYETHYLENE GLYCOL (GLYCOLAX) POWDER    Take 17 g by mouth daily    SODIUM HYPOCHLORITE 0.0125 % SOLN    Apply topically    SPIRONOLACTONE (ALDACTONE) 25 MG TABLET    TAKE 1 TABLET DAILY    TIMOLOL (TIMOPTIC) 0.5 % OPHTHALMIC SOLUTION    Place 1 drop into both eyes 2 times daily     ZOSTER RECOMBINANT ADJUVANTED VACCINE (SHINGRIX) 50 MCG/0.5ML SUSR INJECTION    Inject 0.5 mLs into the muscle See Admin Instructions 1 dose now and repeat in 2-6 months       ALLERGIES     Patient has no known allergies.     FAMILY HISTORY       Family History   Problem Relation Age of Onset    Cancer Mother     Other Father           SOCIAL HISTORY       Social History     Socioeconomic History    Marital status:      Spouse name: Iza santos    Number of children: None    Years of education: None    Highest education level: None   Occupational History    None   Social Needs    Financial resource strain: None    Food insecurity     Worry: None     Inability: None    Transportation needs     Medical: None Non-medical: None   Tobacco Use    Smoking status: Former Smoker    Smokeless tobacco: Never Used   Substance and Sexual Activity    Alcohol use: No    Drug use: No    Sexual activity: None   Lifestyle    Physical activity     Days per week: None     Minutes per session: None    Stress: None   Relationships    Social connections     Talks on phone: None     Gets together: None     Attends Mandaeism service: None     Active member of club or organization: None     Attends meetings of clubs or organizations: None     Relationship status: None    Intimate partner violence     Fear of current or ex partner: None     Emotionally abused: None     Physically abused: None     Forced sexual activity: None   Other Topics Concern    None   Social History Narrative    None       SCREENINGS    [place] Coma Scale  Eye Opening: Spontaneous  Best Verbal Response: Oriented  Best Motor Response: Obeys commands  [place] Coma Scale Score: 15      PHYSICAL EXAM    (up to 7 forlevel 4, 8 or more for level 5)     ED Triage Vitals [12/06/20 1252]   BP Temp Temp src Pulse Resp SpO2 Height Weight   (!) 116/57 97.8 °F (36.6 °C) -- 72 17 91 % 5' 9\" (1.753 m) 224 lb (101.6 kg)       Physical Exam  Vitals signs and nursing note reviewed. Constitutional:       General: He is not in acute distress. Appearance: Normal appearance. He is well-developed. He is not diaphoretic. HENT:      Head: Normocephalic and atraumatic. Right Ear: External ear normal.      Left Ear: External ear normal.   Eyes:      Pupils: Pupils are equal, round, and reactive to light. Neck:      Musculoskeletal: Normal range of motion and neck supple. Trachea: No tracheal deviation. Cardiovascular:      Rate and Rhythm: Normal rate and regular rhythm. Pulses: Normal pulses. Heart sounds: Normal heart sounds. No murmur. Pulmonary:      Effort: Pulmonary effort is normal.      Breath sounds: Normal breath sounds. No stridor.  No wheezing. Chest:      Chest wall: No tenderness. Abdominal:      General: Bowel sounds are normal. There is no distension. Palpations: Abdomen is soft. Tenderness: There is no abdominal tenderness. Musculoskeletal: Normal range of motion. Skin:     General: Skin is warm and dry. Capillary Refill: Capillary refill takes less than 2 seconds. Neurological:      Mental Status: He is alert and oriented to person, place, and time. Psychiatric:         Mood and Affect: Mood normal.         Behavior: Behavior normal.         Thought Content: Thought content normal.         Judgment: Judgment normal.           DIAGNOSTIC RESULTS     RADIOLOGY:   Non-plain film images such as CT, Ultrasound and MRI are read by the radiologist. Plain radiographic images are visualized and preliminarilyinterpreted by Juan Armstrong MD with the below findings:      Interpretation per the Radiologist below, if available at the time of this note:    CT ABDOMEN PELVIS WO CONTRAST Additional Contrast? None   Final Result   1. There is focal wall thickening of the ascending colon measuring   about 10 cm in length. There is stranding of the adjacent fat. This is   probably a focal area of colitis involving the proximal colon. There   are no significant diverticula identified in the area. Colon cancer is   not fully ruled out. Small hiatal hernia. GI follow-up recommended. 2. Prior cholecystectomy. 3. Probable 5.4 cm cyst in the upper pole left kidney, not fully   evaluated without contrast. Probable 4.6 cm parapelvic renal cyst on   the left versus an extrarenal pelvis. Ureters are nondilated. Urinary   bladder unremarkable. 4. Diverticulosis of the colon predominantly in the sigmoid region. 5. Prominent duodenal diverticulum. Atheromatous disease of the   aortoiliac vessels and coronary arteries. Mild cardiomegaly. 6. Prior prostatectomy. 7. Other nonacute findings, as discussed above.    The full report of this exam was immediately signed and available to   the emergency room. The patient is currently in the emergency room. Signed by Dr Imelda Jon on 12/6/2020 6:07 PM      XR CHEST PORTABLE   Final Result   1. Bibasilar infiltrate or atelectasis with evidence of a small amount   of left-sided pleural fluid. Signed by Dr Amado Barreto on 12/6/2020 3:25 PM          LABS:  Labs Reviewed   CBC WITH AUTO DIFFERENTIAL - Abnormal; Notable for the following components:       Result Value    WBC 24.8 (*)     RBC 3.68 (*)     Hemoglobin 11.6 (*)     Hematocrit 35.8 (*)     MCV 97.3 (*)     MCH 31.5 (*)     MCHC 32.4 (*)     Neutrophils % 76.7 (*)     Lymphocytes % 17.2 (*)     Neutrophils Absolute 19.0 (*)     Monocytes Absolute 1.40 (*)     All other components within normal limits   COMPREHENSIVE METABOLIC PANEL W/ REFLEX TO MG FOR LOW K - Abnormal; Notable for the following components:    Sodium 134 (*)     Chloride 96 (*)     Glucose 333 (*)     BUN 52 (*)     CREATININE 2.1 (*)     GFR Non- 29 (*)     GFR  36 (*)     All other components within normal limits   PROTIME-INR - Abnormal; Notable for the following components:    Protime 18.0 (*)     INR 1.48 (*)     All other components within normal limits   APTT - Abnormal; Notable for the following components:    aPTT 37.4 (*)     All other components within normal limits   RESPIRATORY PANEL, MOLECULAR, WITH COVID-19   CULTURE, BLOOD 1   CULTURE, BLOOD 2   LIPASE   LACTATE, SEPSIS   URINE RT REFLEX TO CULTURE   LACTATE, SEPSIS       All other labs were within normal range or notreturned as of this dictation.     RE-ASSESSMENT        EMERGENCY DEPARTMENT COURSE and DIFFERENTIAL DIAGNOSIS/MDM:   Vitals:    Vitals:    12/06/20 1252 12/06/20 1447 12/06/20 1537   BP: (!) 116/57 (!) 121/55 (!) 115/50   Pulse: 72 70 68   Resp: 17 17 17   Temp: 97.8 °F (36.6 °C)     SpO2: 91% 92% 90%   Weight: 224 lb (101.6 kg)     Height: 5' 9\" (1.753 m) MDM  With elevated light white counts pneumonia due to organism colitis read on CT and age we elect to admit for antibiotic coverage he is denying any nausea vomiting to me O2 sat in the 90s plan for assistance there Dr. Mariza García my attending is spoke with hospitalist agrees take over care of patient. PROCEDURES:    Procedures      FINAL IMPRESSION      1. Non-intractable vomiting with nausea, unspecified vomiting type    2. Colitis    3. Pneumonia due to organism          DISPOSITION/PLAN   DISPOSITION Admitted 12/06/2020 07:20:08 PM      PATIENT REFERRED TO:  No follow-up provider specified.     DISCHARGE MEDICATIONS:  New Prescriptions    No medications on file       (Please note that portions of this note were completed with a voice recognition program.  Efforts were made to edit the dictations but occasionallywords are mis-transcribed.)    [facility] [address]  12/06/20 1926

## 2020-12-07 NOTE — H&P
[id number] [facility]  [facility]ist - History & Physical      PCP: Giovanna Ospina MD    Date of Admission: 12/6/2020    Date of Service: 12/6/2020    Chief Complaint:  Vomiting    History Of Present Illness: The patient is a 80 y.o. male with PMH CAD, CHF, CKD 3, Colon cancer, DM II, Diastolic CHF, seizures who presented to [facility] ED from [address] [facility] with one episode of nausea and vomiting. He is a poor historian and has underlying dementia. He was tested for COVID-19 which was negative. Was recently started on Lexapro and nursing home concerned that this is what caused vomiting. Mr. Ebony López has no complaints other than he feels cold. Denies dyspnea, chest pain, heart palpitations, abdominal pain or diarrhea. Work up in ED concerning for pneumonia with leukocytosis as well as possible colitis in proximal colon. Additionally CT abdomen revealed a focal wall thickening of ascending colon measuring about 10 cm in length. Mr. Ebony López does have known history of colon cancer.     Past Medical History:        Diagnosis Date    Arthritis     CAD (coronary artery disease)     Cancer ([address]     CHF (congestive heart failure) (HCC)     CKD (chronic kidney disease) stage 3, GFR 30-59 ml/min 5/10/2016    Colon cancer ([address]     Diabetes mellitus (HCC)     Diastolic heart failure ([address]     H/O partial resection of colon     Hypertension     Pacemaker     Palliative care patient 11/02/2018    Pneumonia 1/14/2019    Prostate CA ([address]     Pure hypercholesterolemia 5/10/2016    Seizures (HCC)     Sick sinus syndrome ([address]     Skin cancer      Past Surgical History:        Procedure Laterality Date    CARDIAC SURGERY      CHOLECYSTECTOMY      COLECTOMY      COLON SURGERY      [place] DEBRIDEMENT, SKIN, SUB-Q TISSUE,=<20 SQ CM Left 11/1/2018    INCISION AND DRAINAGE AND EXCISIONAL DEBRIDEMENT OF SKIN AND SUBCUTANEOUS TISSUE OF LEFT FOOT ABSCESS [id number] performed by Jay Jay Harris MD at [address] [place] [address] Medications:  Prior to Admission medications    Medication Sig Start Date End Date Taking? Authorizing Provider   escitalopram (LEXAPRO) 10 MG tablet Take 1 tablet by mouth daily 12/4/20  Yes Marquise Regan MD   amLODIPine (NORVASC) 5 MG tablet TAKE 1 TABLET DAILY (MUST KEEP FOLLOW UP FOR ADDITIONAL REFILLS) 12/1/20  Yes Marquise Regan MD   ezetimibe (ZETIA) 10 MG tablet TAKE 1 TABLET DAILY 11/16/20  Yes Marquise Regan MD   furosemide (LASIX) 40 MG tablet TAKE 1 TABLET DAILY 10/29/20  Yes Marquise Regan MD   insulin lispro, 1 Unit Dial, (HUMALOG KWIKPEN) 100 UNIT/ML SOPN INJECT AS DIRECTED PER SLIDING SCALE 10/28/20  Yes Marquise Regan MD   insulin glargine (LANTUS SOLOSTAR) 100 UNIT/ML injection pen INJECT AS DIRECTED PER SLIDING SCALE 10/28/20  Yes Marquise Regan MD   Insulin Pen Needle 32G X 4 MM MISC 1 each by Does not apply route 4 times daily (before meals and nightly) 10/19/20  Yes Marquise Regan MD   Continuous Blood Gluc Sensor (FREESTYLE PEPITO 14 DAY SENSOR) MISC APPLY EVERY 14 DAYS AS DIRECTED 10/16/20  Yes Marquise Regan MD   carvedilol (COREG) 3.125 MG tablet TAKE 1 TABLET TWICE A DAY 10/15/20  Yes Marquise Regan MD   spironolactone (ALDACTONE) 25 MG tablet TAKE 1 TABLET DAILY 8/10/20  Yes Marquise Regan MD   isosorbide dinitrate (ISORDIL) 5 MG tablet TAKE 3 TABLETS TWICE A DAY 4/28/20  Yes Marquise Regan MD   minocycline (MINOCIN;DYNACIN) 100 MG capsule Take 100 mg by mouth 2 times daily   Yes Historical Provider, MD   Sodium Hypochlorite 0.0125 % SOLN Apply topically   Yes Historical Provider, MD   ELIQUIS 2.5 MG TABS tablet TAKE 1 TABLET TWICE A DAY 12/30/19  Yes SHRUTI Chávez - CNP   nystatin (MYCOSTATIN) 520725 UNIT/GM cream Apply topically 2 times daily.  12/9/19  Yes Marquise Regan MD   nystatin (MYCOSTATIN) 423993 UNIT/GM powder Twice  dly 12/9/19  Yes Marquise Regan MD   atorvastatin (LIPITOR) 10 MG tablet TAKE 1 TABLET DAILY AT BEDTIME 9/16/19 Yes Alta Merlin, MD   Bismuth Tribromoph-Petrolatum (XEROFORM PETROLATUM GAUZE 5\"X9\") MISC external pads Apply 1 each topically every other day 1/21/19  Yes Arturo Jama, DO   polyethylene glycol [facility]) powder Take 17 g by mouth daily 5/17/18  Yes Alta Merlin, MD   B-D INS SYR ULTRAFINE 1CC/31G 31G X 5/16\" 1 ML MISC  10/31/17  Yes Historical Provider, MD   timolol (TIMOPTIC) 0.5 % ophthalmic solution Place 1 drop into both eyes 2 times daily  10/4/17  Yes Historical Provider, MD   Coenzyme Q10 (COQ10 PO) Take 10 mg by mouth nightly   Yes Historical Provider, MD   Garlic 10 MG CAPS Take 1 capsule by mouth daily Pt unsure of dose. States it changes because he gets whatever is on sale. Yes Historical Provider, MD   Multiple Vitamins-Minerals (THERAPEUTIC MULTIVITAMIN-MINERALS) tablet Take 1 tablet by mouth daily   Yes Historical Provider, MD   meclizine (ANTIVERT) 12.5 MG tablet Take 12.5 mg by mouth 2 times daily    Yes Historical Provider, MD   zoster recombinant adjuvanted vaccine (SHINGRIX) 50 MCG/0.5ML SUSR injection Inject 0.5 mLs into the muscle See Admin Instructions 1 dose now and repeat in 2-6 months 3/2/20   Alta Merlin, MD   nitroGLYCERIN (NITROSTAT) 0.4 MG SL tablet Place 0.4 mg under the tongue every 5 minutes as needed for Chest pain    Historical Provider, MD     Allergies:    Patient has no known allergies. Social History:    The patient currently lives at a skilled nursing facility. Tobacco:   reports that he has quit smoking. He has never used smokeless tobacco.  Alcohol:   reports no history of alcohol use.   Illicit Drugs: denies    Family History:      Problem Relation Age of Onset    Cancer Mother     Other Father      Review of Systems:   Constitutional / general:  Denies fever / chills / sweats  Head:  Denies headache / neck stiffness / trauma / visual change  Eyes:  Denies blurry vision / acute visual change or loss / itching / redness  ENT: Denies sore throat / hoarseness / nasal drainage / ear pain  CV:  Denies chest pain / palpitations/ orthopnea   Respiratory:  Denies cough / shortness of breath / sputum / hemoptysis  GI: Denies nausea /+ vomitingx1 / abdominal pain / diarrhea / constipation  :  Denies dysuria / hesitancy / urgency / hematuria   Neuro: Denies paralysis / syncope / seizure / dysphagia / headache / paresthesias  Musculoskeletal:  Denies muscle weakness /joint stiffness / pain  Vascular: Denies edema / claudication / varicosities  Heme / endocrine: Denies easy bruising / bleeding / excessive sweating / heat or cold intolerance  Psychiatric:  Denies depression / anxiety / insomnia / mood changes  Skin:  Denies new rashes / lesions / skin hair or nail changes    14 point review of systems is negative except as specifically addressed above. Physical Examination:  BP (!) 115/50   Pulse 68   Temp 97.8 °F (36.6 °C)   Resp 17   Ht 5' 9\" (1.753 m)   Wt 224 lb (101.6 kg)   SpO2 (!) 87%   BMI 33.08 kg/m²   General appearance: alert, appears stated age, cooperative and no distress,hard of hearing  Head: Normocephalic, without obvious abnormality, atraumatic  Eyes: conjunctivae/corneas clear. PERRL, EOM's intact. Ears: normal external ears and nose, throat without exudate  Neck: no adenopathy, no carotid bruit, no JVD, supple, symmetrical, trachea midline   Lungs: soft bibasilar rales otherwise no wheezes or rhonchi  Heart: regular rate and rhythm, S1, S2 normal, systolic murmur  Abdomen:soft, non-tender; non-distended, normal bowel sounds no masses, no organomegaly  Extremities:1-2+ bilateral lower extremity edema,  No erythema, no tenderness to palpation  Skin: Pale, warm, dry  Lymphatic: No palpable lymph node enlargment  Neurologic: Alert and oriented X 2, generalized weakness and normal tone.  Speech fluent, no focal deficits  Psychiatric: Calm, flat affect     Diagnostic Data:  CBC:  Recent Labs     12/06/20  1433   WBC 24.8*   HGB 11.6*   HCT 35.8*      BMP:  Recent Labs     12/06/20  1433   *   K 4.9   CL 96*   CO2 25   BUN 52*   CREATININE 2.1*   CALCIUM 9.3     Recent Labs     12/06/20  1433   AST 17   ALT 10   BILITOT 0.6   ALKPHOS 70     Coag Panel:   Recent Labs     12/06/20  1433   INR 1.48*   PROTIME 18.0*   APTT 37.4*     Ct Abdomen Pelvis Wo Contrast Additional Contrast? None  1. There is focal wall thickening of the ascending colon measuring about 10 cm in length. There is stranding of the adjacent fat. This is probably a focal area of colitis involving the proximal colon. There are no significant diverticula identified in the area. Colon cancer is not fully ruled out. Small hiatal hernia. GI follow-up recommended. 2. Prior cholecystectomy. 3. Probable 5.4 cm cyst in the upper pole left kidney, not fully evaluated without contrast. Probable 4.6 cm parapelvic renal cyst on the left versus an extrarenal pelvis. Ureters are nondilated. Urinary bladder unremarkable. 4. Diverticulosis of the colon predominantly in the sigmoid region. 5. Prominent duodenal diverticulum. Atheromatous disease of the aortoiliac vessels and coronary arteries. Mild cardiomegaly. 6. Prior prostatectomy. 7. Other nonacute findings, as discussed above. The full report of this exam was immediately signed and available to the emergency room. The patient is currently in the emergency room. Signed by Dr Franca Ferrer on 12/6/2020 6:07 PM    Xr Chest Portable  1. Bibasilar infiltrate or atelectasis with evidence of a small amount of left-sided pleural fluid. Signed by Dr Jordyn Valdez on 12/6/2020 3:25 PM    Assessment/Plan:  Principal Problem:    Colitis w/ known hx colon cancer, No N/V or diarrhea reported this far. Continue empiric antibiotics for now with leukocytosis. Check stools Continue Rocephin and Flagyl    Active Problems:    Pneumonia vs atelectasis-mild hypoxia noted on room air, COVID-19 negative.  Add Duonebs, check blood/sputum cultures, supplemental oxygen as needed, add IS      MICHELLE superimposed on CKD 3-continue gentle IVF's to avoid volume overload. Patient does have BLE edema which is chronic per documentation per his PCP. Coronary artery disease involving native coronary artery without angina pectoris w/ hx complete heart block s/p pacemaker placement-no complaint of chest pain, stable      Anemia in CKD (chronic kidney disease)-stable      Hypertension-hold antihypertensives for now and monitor      Diabetes mellitus (HCC)-SSI, accuchecks, hypoglycemia tx orders.  This has been poorly controlled as OP but due to patient's advancing dementia and he is resident at a nursing home it has been difficult to gain adequate control       Dementia-noted, further orders per clinical course/attenidng    Signed:  Atiya Pantoja PA-C       Attestation Statement     I have independently seen and examined this patient and agree with the asesment and plan by mid level provider                                     CC' pt not sure why he is here, per report had Vomiting    Objective:   Vitals: BP (!) 146/69   Pulse 77   Temp 97.2 °F (36.2 °C) (Temporal)   Resp 18   Ht 5' 9\" (1.753 m)   Wt 224 lb (101.6 kg)   SpO2 97%   BMI 33.08 kg/m²   General appearance: alert, appears stated age and cooperative  Skin: Skin color, texture, turgor normal.   Abdomen: soft, non-tender; bowel sounds normal; no masses,  no organomegaly  Neurologic: Mental status: Alert, oriented times 2    Assessment & Plan:    Colitis with leukocytosis- ivf, iv ab  N/V- phenergan prn  DM2- ISS, lantus  History of colon cancer- follow up as OP     Javier Mendiola MD

## 2020-12-07 NOTE — FLOWSHEET NOTE
Alex Oneal arrived to room # 527. Presented with: Colitis  Mental Status: Patient is oriented, alert, coherent, logical, thought processes intact and able to concentrate and follow conversation. Vitals:    12/06/20 2202   BP: (!) 146/69   Pulse: 77   Resp: 18   Temp: 97.2 °F (36.2 °C)   SpO2: 97%     Patient safety contract and falls prevention contract reviewed with patient Yes. Oriented Patient to room. Call light within reach. Yes.   Needs, issues or concerns expressed at this time: no.      Electronically signed by Eliane Davis RN on 12/6/2020 at 10:19 PM
[NL] : regular rate and rhythm, normal S1, S2 audible, no murmurs

## 2020-12-07 NOTE — PROGRESS NOTES
Vascular lab preliminary results. Bilateral lower extremity venous duplex scan performed. No evidence of DVT or SVT in areas visualized at this time. Final report pending.

## 2020-12-07 NOTE — CONSULTS
GI Consult Note    Pt Name: Rick Hyde  MRN: [id number]  [id number]  YOB: 1925  Admit Date: 12/6/2020 12:51 PM  Date of evaluation: 12/7/2020  Primary Care Physician: Abdirizak Rice MD   [id number]       CC:  Colitis    HPI: 63-year-old male with past medical history of colon cancer, CAD, CHF, CKD, diabetes, hypertension, prostate cancer, sick sinus syndrome, and skin cancer presented to the hospital from his nursing home with an episode of nausea and vomiting. Unfortunately the patient has a history of dementia as well, and is quite a poor historian as he can not confirm as to whether he has had a colon cancer in the past or not. Per the patient he had no complaints and was just sent to the hospital for an unknown reason. On initial evaluation, there was a region in the ascending colon approximately 10 cm in length with inflammatory changes concerning for acute colitis. Patient significant history of colon cancer, GI was consulted for an evaluation. Per the patient he denies hematemesis, coffee-ground emesis, bright red blood per rectum, and hematochezia. He also denies any current abdominal pain.       Past Medical History:        Diagnosis Date    Arthritis     CAD (coronary artery disease)     Cancer ([address]     CHF (congestive heart failure) (HCC)     CKD (chronic kidney disease) stage 3, GFR 30-59 ml/min 5/10/2016    Colon cancer ([address]     Diabetes mellitus (HCC)     Diastolic heart failure ([address]     H/O partial resection of colon     Hypertension     Pacemaker     Palliative care patient 11/02/2018    Pneumonia 1/14/2019    Prostate CA ([address]     Pure hypercholesterolemia 5/10/2016    Seizures (HCC)     Sick sinus syndrome ([address]     Skin cancer      Past Surgical History:        Procedure Laterality Date    CARDIAC SURGERY      CHOLECYSTECTOMY      COLECTOMY      COLON SURGERY      ND DEBRIDEMENT, SKIN, SUB-Q TISSUE,=<20 SQ CM Left 11/1/2018    INCISION AND DRAINAGE AND EXCISIONAL DEBRIDEMENT OF SKIN AND SUBCUTANEOUS TISSUE OF LEFT FOOT ABSCESS [id number] performed by Tyesha Devi MD at [facility] OR     Social History:   Social History     Tobacco Use    Smoking status: Former Smoker    Smokeless tobacco: Never Used   Substance Use Topics    Alcohol use: No     Family History:   Family History   Problem Relation Age of Onset    Cancer Mother     Other Father      Home Meds:  Prior to Admission medications    Medication Sig Start Date End Date Taking?  Authorizing Provider   escitalopram (LEXAPRO) 10 MG tablet Take 1 tablet by mouth daily 12/4/20  Yes Enid Patel MD   amLODIPine (NORVASC) 5 MG tablet TAKE 1 TABLET DAILY (MUST KEEP FOLLOW UP FOR ADDITIONAL REFILLS) 12/1/20  Yes Enid Patel MD   ezetimibe (ZETIA) 10 MG tablet TAKE 1 TABLET DAILY 11/16/20  Yes Enid Patel MD   furosemide (LASIX) 40 MG tablet TAKE 1 TABLET DAILY 10/29/20  Yes Enid Patel MD   insulin lispro, 1 Unit Dial, (HUMALOG KWIKPEN) 100 UNIT/ML SOPN INJECT AS DIRECTED PER SLIDING SCALE 10/28/20  Yes Enid Patel MD   insulin glargine (LANTUS SOLOSTAR) 100 UNIT/ML injection pen INJECT AS DIRECTED PER SLIDING SCALE 10/28/20  Yes Enid Patel MD   Insulin Pen Needle 32G X 4 MM MISC 1 each by Does not apply route 4 times daily (before meals and nightly) 10/19/20  Yes Enid Patel MD   Continuous Blood Gluc Sensor (FREESTYLE PEPITO 14 DAY SENSOR) MISC APPLY EVERY 14 DAYS AS DIRECTED 10/16/20  Yes Enid Patel MD   carvedilol (COREG) 3.125 MG tablet TAKE 1 TABLET TWICE A DAY 10/15/20  Yes Enid Patel MD   spironolactone (ALDACTONE) 25 MG tablet TAKE 1 TABLET DAILY 8/10/20  Yes Enid Patel MD   isosorbide dinitrate (ISORDIL) 5 MG tablet TAKE 3 TABLETS TWICE A DAY 4/28/20  Yes Enid Patel MD   minocycline (MINOCIN;DYNACIN) 100 MG capsule Take 100 mg by mouth 2 times daily   Yes Clarissa Estrada MD   Sodium Hypochlorite 0.0125 % SOLN Apply topically Yes Historical Provider, MD   ELIQUIS 2.5 MG TABS tablet TAKE 1 TABLET TWICE A DAY 12/30/19  Yes Dean Davies, APRN - CNP   nystatin (MYCOSTATIN) 980887 UNIT/GM cream Apply topically 2 times daily. 12/9/19  Yes Marquise Regan MD   nystatin (MYCOSTATIN) 109695 UNIT/GM powder Twice  dly 12/9/19  Yes Marquise Regan MD   atorvastatin (LIPITOR) 10 MG tablet TAKE 1 TABLET DAILY AT BEDTIME 9/16/19  Yes Marquise Regan MD   Bismuth Tribromoph-Petrolatum (XEROFORM PETROLATUM GAUZE 5\"X9\") MISC external pads Apply 1 each topically every other day 1/21/19  Yes Berna Ulrich,    polyethylene glycol [facility]) powder Take 17 g by mouth daily 5/17/18  Yes Marquise Regan MD   B-D INS SYR ULTRAFINE 1CC/31G 31G X 5/16\" 1 ML MISC  10/31/17  Yes Historical Provider, MD   timolol (TIMOPTIC) 0.5 % ophthalmic solution Place 1 drop into both eyes 2 times daily  10/4/17  Yes Historical Provider, MD   Coenzyme Q10 (COQ10 PO) Take 10 mg by mouth nightly   Yes Historical Provider, MD   Garlic 10 MG CAPS Take 1 capsule by mouth daily Pt unsure of dose. States it changes because he gets whatever is on sale. Yes Historical Provider, MD   Multiple Vitamins-Minerals (THERAPEUTIC MULTIVITAMIN-MINERALS) tablet Take 1 tablet by mouth daily   Yes Historical Provider, MD   meclizine (ANTIVERT) 12.5 MG tablet Take 12.5 mg by mouth 2 times daily    Yes Historical Provider, MD   zoster recombinant adjuvanted vaccine (SHINGRIX) 50 MCG/0.5ML SUSR injection Inject 0.5 mLs into the muscle See Admin Instructions 1 dose now and repeat in 2-6 months 3/2/20   Marquise Regan MD   nitroGLYCERIN (NITROSTAT) 0.4 MG SL tablet Place 0.4 mg under the tongue every 5 minutes as needed for Chest pain    Historical Provider, MD      Allergies:  Patient has no known allergies.       Current Meds:      ciprofloxacin  400 mg Intravenous Q12H    insulin lispro  0-18 Units Subcutaneous TID WC    insulin lispro  0-9 Units Subcutaneous Nightly    atorvastatin 10 mg Oral Nightly    apixaban  2.5 mg Oral BID    ezetimibe  10 mg Oral Daily    timolol  1 drop Both Eyes BID    sodium chloride flush  10 mL Intravenous 2 times per day    metroNIDAZOLE  500 mg Intravenous Q8H    insulin glargine  10 Units Subcutaneous BID        sodium chloride 75 mL/hr at 12/07/20 1118    dextrose         PRN Meds:  nitroGLYCERIN, sodium chloride flush, acetaminophen **OR** acetaminophen, polyethylene glycol, promethazine **OR** ondansetron, glucose, dextrose, glucagon (rDNA), dextrose, albuterol        ROS:  ROS NEGATIVE EXCEPT THOSE MARKED WITH AN \"X\"    GENERAL: [] Fevers, [] chills, [] generalized weakness, [] weight loss, []weight gain, [] anorexia  Skin/Breast: [] jaundice, [] new rashes, [] itching   Eyes/Ears/Nose/Mouth/Throat: [] change in vision, [] double vision, [] light headiness, [] vertigo  CARDIOVASCULAR: [] chest pain, [] palpitations, [] syncope, [] dyspnea on exertion, [] orthopnea  RESPIRATORY: [] SOB, [] cough, [] wheezing, [] hemoptysis  GI: [] dark stools, [] bloody stools, [] BRBPR, [] abdominal pain, [] GERD like symptoms, [] nausea, [x] vomiting, [] hematemesis, [] jaundice, [] constipation, [] diarrhea, [] hemorrhoids, [] change in bowel habits, [] bowel incontinence  : [] Dysuria, [] urgency, [] frequency, [] change in urine color, [] discharge  MUSCULOSKELETAL: [] muscle pain, [] muscle swelling, [ joint pain, [] muscle weakness  Neurological/Psychiatric: [] Sensory disturbances, [] motor disturbances, [] difficulty with speech, [] paresthesias, [] paralysis, [] depression, [] anxiety   Allergy/Immunological/Lymphatic/Endocrine: [] anemia, [] rashes, [] polyuria, [] polydypsia      Physical Exam:  Vitals:    12/07/20 0047 12/07/20 0723 12/07/20 0850 12/07/20 1237   BP: (!) 114/59 135/64  (!) 140/62   Pulse: 70 76  68   Resp: 16 16 18 18   Temp: 97.3 °F (36.3 °C) 96.9 °F (36.1 °C)  97.2 °F (36.2 °C)   TempSrc: Temporal Temporal  Temporal   SpO2: 92% 92% 94% 90%   Weight:       Height:           Constitutional: [x] NAD, [x] of stated age, [x] well nourished  Eyes: [x] conjunctiva clear, [x] Non inflamed irises, [x] no scleral icterus  ENT/Mouth: [x]  Nares patent with pink mucosa, [x] oropharynx clear without exudates or erythema, [x] hearing deficit  Head/Neck: [x] symmetrical, [x] supple  Lungs: [x] respirations non labored with good effort, [x] no wheezing, [x]  Equal air entry bilaterally  Heart: [[x]  Systolic murmur, [x] pedal pulses preserved 2/4 bilaterally, [x] LE edema  Abdomen: [x] +BSx4, [x] NTND, [x] soft, [x] no guarding, [x] no peritoneal signs, [x] vertical surgical scar  Musculoskeletal: [x]  Normal nails bilaterally, [x] Normal digits bilaterally, [x] no muscle atrophy  Skin/SubQ: [x] No jaundice, [x] warm, dry skin, [x] no rashes on inspection  Neurologic: [x]  Sensation grossly intact, [x] no slurred speech, [x]  No focal deficits  Psychiatric: [x]  Orientated to person, place, and time; [x] mood and affect unremarkable, [x] memory recent and remote intact      Labs:     Recent Labs     12/06/20  1433 12/07/20  0320   WBC 24.8* 20.0*   RBC 3.68* 3.55*   HGB 11.6* 11.2*   HCT 35.8* 35.0*   MCV 97.3* 98.6*   MCH 31.5* 31.5*   MCHC 32.4* 32.0*    196     Recent Labs     12/06/20  1433 12/07/20  0320   * 138   K 4.9 4.4   ANIONGAP 13 13   CL 96* 103   CO2 25 22   BUN 52* 52*   CREATININE 2.1* 2.0*   GLUCOSE 333* 300*   CALCIUM 9.3 8.3     No results for input(s): MG, PHOS in the last 72 hours. Recent Labs     12/06/20  1433   AST 17   ALT 10   BILITOT 0.6   ALKPHOS 70     HgBA1c:  No components found for: HGBA1C  FLP:    Lab Results   Component Value Date    TRIG 118 08/25/2020    HDL 34 08/25/2020    LDLCALC 77 08/25/2020     TSH:    Lab Results   Component Value Date    TSH 4.96 05/10/2016     Troponin T: No results for input(s): TROPONINI in the last 72 hours.   INR:   Recent Labs     12/06/20  1433   INR 1.48*       Recent Labs 12/06/20  1433   LIPASE 13       Radiology:  Ct Abdomen Pelvis Wo Contrast Additional Contrast? None    Result Date: 12/6/2020  EXAMINATION:  CT ABDOMEN PELVIS WO CONTRAST  12/6/2020 5:58 PM HISTORY: Elevated white blood cell count. Vomiting. TECHNIQUE: Spiral CT was performed of the abdomen and pelvis without contrast. Multiplanar images were reconstructed. DLP: 2332 mGy-cm. Automated exposure control was utilized. COMPARISON: No comparison study. LUNG BASES: There is coronary artery calcification. There is mild cardiomegaly. 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 There is dependent atelectasis in both lung bases. LIVER AND SPLEEN: Prior cholecystectomy. Unenhanced liver unremarkable. No significant biliary distention. Unenhanced spleen unremarkable. PANCREAS: There is a 5.2 mm duodenal diverticulum adjacent to the head of the pancreas. The unenhanced pancreas is unremarkable. KIDNEYS AND ADRENALS: The adrenal glands are unremarkable. There is a 5.4 cm probable cyst in the upper pole left kidney. There is a probable parapelvic renal cyst on the left versus an extrarenal pelvis measuring 4.6 cm. The unenhanced right kidney is unremarkable. The ureters are nondilated. The urinary bladder is unremarkable. BOWEL: No oral contrast was given. There is a small hiatal hernia. There is fluid in the stomach. There is no gastric wall thickening. Small bowel loops are nondilated. There is focal wall thickening involving the ascending colon. There is stranding of the adjacent fat. This area measures about 10.7 cm in length. There is diverticulosis of the colon predominantly in the sigmoid region. The appendix is normal. OTHER: There is atheromatous disease of the aortoiliac vessels. There are degenerative changes of the spine and hips. There is mild compression deformity of L1 that is likely chronic in age. There has been prior prostatectomy.     1. There is focal wall thickening of the ascending colon measuring about 10 cm in length. There is stranding of the adjacent fat. This is probably a focal area of colitis involving the proximal colon. There are no significant diverticula identified in the area. Colon cancer is not fully ruled out. Small hiatal hernia. GI follow-up recommended. 2. Prior cholecystectomy. 3. Probable 5.4 cm cyst in the upper pole left kidney, not fully evaluated without contrast. Probable 4.6 cm parapelvic renal cyst on the left versus an extrarenal pelvis. Ureters are nondilated. Urinary bladder unremarkable. 4. Diverticulosis of the colon predominantly in the sigmoid region. 5. Prominent duodenal diverticulum. Atheromatous disease of the aortoiliac vessels and coronary arteries. Mild cardiomegaly. 6. Prior prostatectomy. 7. Other nonacute findings, as discussed above. The full report of this exam was immediately signed and available to the emergency room. The patient is currently in the emergency room. Signed by Dr Cory Monzon on 12/6/2020 6:07 PM    Xr Chest Portable    Result Date: 12/6/2020  XR CHEST PORTABLE 12/6/2020 3:24 PM History: Vomiting. Elevated white blood cell count. Portable chest x-ray compared with 14 January 2019. Chronic interstitial lung disease. No pneumothorax or heart failure. Patchy bibasilar infiltrate or atelectasis. Opacity at the left lateral costophrenic angle compatible with small pleural effusion. The upper lobes are clear. Heart size is magnified by the portable projection. CABG changes are present. Unchanged left cardiac pacer. 1. Bibasilar infiltrate or atelectasis with evidence of a small amount of left-sided pleural fluid. Signed by Dr Roshan Pike on 12/6/2020 3:25 PM      Assessment:  1. Colitis  2. Hx colon cancer  3. Small left sided pleural effusion    Plan:  -Pleasant 80year-old gentleman who unfortunately has a history of dementia and unable to provide good HPI details.   Per the chart he has a history of colon cancer and on physical exam he does have a large vertical scar in his abdomen. He cannot elaborate on any previous diagnosis of colon cancer.   On her CT abdomen here at [facility], there is a 10 cm region of colonic thickening in the ascending colon and at this time we will treat this as an acute colitis continuing Cipro and Flagyl.  -Will order stool culture and C. difficile as the patient did have significant leukocytosis of 24.8 on admission.  -Will need to discuss wishes with patient's family members as to whether they would like to pursue a colonoscopy in the future with his history of colon cancer, though it must be noted that with his advanced age of 80years old if any significant findings on the colonoscopy he would likely not be a candidate for any aggressive surgical therapy  -We will continue to follow closely    Evette Vela,

## 2020-12-07 NOTE — CARE COORDINATION
Pt is from [address] [place] [address] in [place], [place]. SW spoke with the  at the facility who stated Pt was ambulatory with his rollator walker before coming to the hospital. As long as Pt is able to ambulate on his own, she is able to accept back. Jearline Fleischer will need to review it before Pt is able to accept to the assisted living facility.    [address] [place] [address] [phone]    Electronically signed by Brayan Ignacio on 12/7/2020 at 2:12 PM

## 2020-12-07 NOTE — PROGRESS NOTES
[facility] Hospital[facility]      Patient:  Castillo Lopez  YOB: 1925  Date of Service: 12/7/2020  MRN: [id number]   Acct: [de-identified]   Primary Care Physician: Danika Saenz MD  Advance Directive: Full Code  Admit Date: 12/6/2020       Hospital Day: 1    CHIEF COMPLAINT Vomiting    SUBJECTIVE:     Pt is laying in bed in no acute distress. He states that his nausea and vomiting is better. He denies any diarrhea today. Abdomen is non-tender upon palpation. Pt does have a cough. Cumulative Hospital Course:   12/06/20 The patient is a 80 y.o. male with PMH CAD, CHF, CKD 3, Colon cancer, DM II, Diastolic CHF, seizures who presented to [address] [place] [address] ED from [facility] with one episode of nausea and vomiting. He is a poor historian and has underlying dementia. He was tested for COVID-19 which was negative. Was recently started on Lexapro and nursing home concerned that this is what caused vomiting. Mr. Kelly Balderrama has no complaints other than he feels cold. Denies dyspnea, chest pain, heart palpitations, abdominal pain or diarrhea. Work up in ED concerning for pneumonia with leukocytosis as well as possible colitis in proximal colon. Additionally CT abdomen revealed a focal wall thickening of ascending colon measuring about 10 cm in length. Mr. Kelly Balderrama does have known history of colon cancer. 12/07/20 GI consulted. Continue on cipro and flagyl. WBC improving. Electrolytes stable. Creatinine down to 2.0 from 2.1. C. Diff and stool cultures sent. Review of Systems:   Review of Systems   Constitutional: Negative for chills, diaphoresis, fatigue and fever. HENT: Negative for congestion, ear pain, sinus pressure, sinus pain and sore throat. Eyes: Negative for photophobia, pain and itching. Respiratory: Positive for cough. Negative for chest tightness, shortness of breath and wheezing. Cardiovascular: Negative for chest pain, palpitations and leg swelling.    Gastrointestinal: Negative for abdominal normal. There is no distension. Palpations: Abdomen is soft. There is no mass. Tenderness: There is no abdominal tenderness. Hernia: No hernia is present. Musculoskeletal:         General: No swelling or signs of injury. Right lower leg: Edema present. Left lower leg: Edema present. Skin:     General: Skin is warm and dry. Coloration: Skin is not jaundiced or pale. Findings: No erythema, lesion or rash. Neurological:      General: No focal deficit present. Mental Status: He is alert and oriented to person, place, and time. Cranial Nerves: No cranial nerve deficit. Sensory: No sensory deficit. Motor: No weakness.    Psychiatric:         Mood and Affect: Mood normal.         Behavior: Behavior normal.            Medications:      sodium chloride 75 mL/hr at 12/07/20 1118    dextrose        ciprofloxacin  400 mg Intravenous Q12H    insulin lispro  0-18 Units Subcutaneous TID WC    insulin lispro  0-9 Units Subcutaneous Nightly    atorvastatin  10 mg Oral Nightly    apixaban  2.5 mg Oral BID    ezetimibe  10 mg Oral Daily    timolol  1 drop Both Eyes BID    sodium chloride flush  10 mL Intravenous 2 times per day    metroNIDAZOLE  500 mg Intravenous Q8H    insulin glargine  10 Units Subcutaneous BID     nitroGLYCERIN, sodium chloride flush, acetaminophen **OR** acetaminophen, polyethylene glycol, promethazine **OR** ondansetron, glucose, dextrose, glucagon (rDNA), dextrose, albuterol  DIET FULL LIQUID; Carb Control: 4 carb choices (60 gms)/meal     Lab and other Data:     Recent Labs     12/06/20  1433 12/07/20  0320   WBC 24.8* 20.0*   HGB 11.6* 11.2*    196     Recent Labs     12/06/20  1433 12/07/20  0320   * 138   K 4.9 4.4   CL 96* 103   CO2 25 22   BUN 52* 52*   CREATININE 2.1* 2.0*   GLUCOSE 333* 300*     Recent Labs     12/06/20  1433   AST 17   ALT 10   BILITOT 0.6   ALKPHOS 70     INR:   Recent Labs     12/06/20  1433   INR

## 2020-12-07 NOTE — PROGRESS NOTES
Occupational Therapy   Occupational Therapy Initial Assessment  Date: 2020   Patient Name: Heydi Steven  MRN: [id number]     : 1925    Date of Service: 2020    Discharge Recommendations:  Patient would benefit from continued therapy after discharge       Assessment   Assessment: Evaluation completed and tx initiated. The patient has significantly decreased mobility and ADL which is limited primarily by calf pain and swelling in left leg. Nurse came to assess leg, assist patient back to supine, and notified the doctor. Will continue to follow. Treatment Diagnosis: Colitis, weakness  History: CHF, CKD, CAD  REQUIRES OT FOLLOW UP: Yes  Activity Tolerance  Activity Tolerance: Patient limited by pain  Safety Devices  Safety Devices in place: Yes  Type of devices: Left in bed;Bed alarm in place           Patient Diagnosis(es): The primary encounter diagnosis was Non-intractable vomiting with nausea, unspecified vomiting type. Diagnoses of Colitis and Pneumonia due to organism were also pertinent to this visit. has a past medical history of Arthritis, CAD (coronary artery disease), Cancer ([address] CHF (congestive heart failure) ([address] CKD (chronic kidney disease) stage 3, GFR 30-59 ml/min, Colon cancer ([address] Diabetes mellitus ([address] Diastolic heart failure ([address] H/O partial resection of colon, Hypertension, Pacemaker, Palliative care patient, Pneumonia, Prostate CA ([address] Pure hypercholesterolemia, Seizures ([address] Sick sinus syndrome ([address] and Skin cancer. has a past surgical history that includes Cardiac surgery; Cholecystectomy; Colon surgery; colectomy; and pr debridement, skin, sub-q tissue,=<20 sq cm (Left, 2018).     Treatment Diagnosis: Colitis, weakness      Restrictions  Restrictions/Precautions  Restrictions/Precautions: Fall Risk    Subjective   General  Chart Reviewed: Yes  Patient assessed for rehabilitation services?: Yes    Social/Functional History  Social/Functional

## 2020-12-07 NOTE — ED NOTES
Informed NADIA Rm that pt's 02 was 86-87% while sleeping, verbal order to place pt on 2L NC.       Lidia Wilson RN  12/06/20 2014

## 2020-12-08 NOTE — PROGRESS NOTES
Physical Therapy    Facility/Department: [facility] SURG SERVICES  Initial Assessment    NAME: Clover Lewis  : 1925  MRN: [id number]    Date of Service: 2020    Discharge Recommendations:  Continue to assess pending progress, Patient would benefit from continued therapy after discharge, 24 hour supervision or assist, Subacute/Skilled Nursing Facility        Assessment   Body structures, Functions, Activity limitations: Decreased functional mobility ; Decreased ROM; Decreased strength;Decreased safe awareness;Decreased cognition;Decreased posture; Increased pain;Decreased balance;Decreased coordination  Assessment: Pt. will benefit from cont. PT to decrease impairments. Pt. a fall risk due to pain in LLE. Pt. has difficulty with all mobility at this time and would benefit from additional therapy upon d/c from [facility]. Anticipate pt. may need SFF for strengthening and ROM. Safest way to transfer pt to chair would be with justine encarnacion. Treatment Diagnosis: impaired gait and mobility  Prognosis: Guarded  Decision Making: Medium Complexity  PT Education: Goals;PT Role;Plan of Care;Gait Training; Adaptive Device Training;Functional Mobility Training;Transfer Training  Patient Education: use of dona light  Barriers to Learning: cognition  REQUIRES PT FOLLOW UP: Yes  Activity Tolerance  Activity Tolerance: Patient limited by pain; Patient limited by cognitive status       Patient Diagnosis(es): The primary encounter diagnosis was Non-intractable vomiting with nausea, unspecified vomiting type. Diagnoses of Colitis and Pneumonia due to organism were also pertinent to this visit.      has a past medical history of Arthritis, CAD (coronary artery disease), Cancer ([facility] [address] CHF (congestive heart failure) ([facility] [address] CKD (chronic kidney disease) stage 3, GFR 30-59 ml/min, Colon cancer ([facility] [address] Diabetes mellitus ([address] Diastolic heart failure ([facility] [address] H/O partial resection of colon, Hypertension, Pacemaker, Palliative care patient, Pneumonia, person;Oriented to place  Social/Functional History  Social/Functional History  Type of Home: Assisted living  Home Equipment: Rolling walker  ADL Assistance: Independent  Ambulation Assistance: Independent  Transfer Assistance: Independent  Cognition   Cognition  Overall Cognitive Status: Exceptions  Following Commands: Follows one step commands with repetition; Follows one step commands with increased time  Attention Span: Attends with cues to redirect  Safety Judgement: Good awareness of safety precautions  Problem Solving: Assistance required to generate solutions;Assistance required to implement solutions  Insights: Decreased awareness of deficits  Initiation: Requires cues for some  Sequencing: Requires cues for some    Objective     Observation/Palpation  Observation: LLE edema, pain. Redness on shin, IV, O2    AROM RLE (degrees)  RLE AROM: Exceptions  RLE General AROM: pt does not fully straighten knee, but AROM other than that WFLs  PROM LLE (degrees)  LLE PROM: Exceptions  LLE General PROM: pt. allows limited AAROM in supine, but able to flex knee and hip to 90 deg in sitting, ankle WFLs, lacks a few deg at knee from full extension  Strength RLE  Strength RLE: Exception  Comment: grossly 3+/5  Strength LLE  Strength LLE: Exception  Comment: grossly 2-/5 knee and hip, ankle 3/5        Bed mobility  Supine to Sit: Maximum assistance;2 Person assistance  Sit to Supine: 2 Person assistance;Maximum assistance  Scooting: (pt able to assist with ebnding RLE and pushing with heel on bed with bed in trendelenberg.  Bed positioned back in neutral and HOB raised to pt's comfort.)  Comment: pt sat on EOB x 10 mins, feeling dizzy and nauseated, coughing a lot  Transfers  Sit to Stand: Maximum Assistance;2 Person Assistance  Stand to sit: Maximum Assistance;2 Person Assistance  Comment: pt stood 2x with RW, but unable to [facility] well and could not take steps  Ambulation  Ambulation?: No     Balance  Posture: Fair  Sitting - Static: Fair;+  Sitting - Dynamic: Fair;-  Standing - Static: Poor  Standing - Dynamic: Poor  Comments: forward lean        Plan   Plan  Times per week: 3-7  Plan weeks: 2  Current Treatment Recommendations: Strengthening, ROM, Balance Training, Functional Mobility Training, Transfer Training, Endurance Training, Gait Training, Safety Education & Training, Positioning, Equipment Evaluation, Education, & procurement, Patient/Caregiver Education & Training  Plan Comment: cont. PT per POC.   Safety Devices  Type of devices: (nursing notified that pt needed change of depends)    G-Code       OutComes Score                                                  AM-PAC Score             Goals  Short term goals  Time Frame for Short term goals: 2 wks  Short term goal 1: supine to sit indep  Short term goal 2: sit to stand indep  Short term goal 3: amb. 100' with RW SBA  Short term goal 4: bed to chair SBA  Patient Goals   Patient goals : go home to [facility], feel better       Therapy Time   Individual Concurrent Group Co-treatment   Time In           Time Out           Minutes                   Daryle Shoe, PT    Electronically signed by Daryle Shoe, PT on 12/8/2020 at 5:48 PM

## 2020-12-08 NOTE — PROGRESS NOTES
[facility] Hospitalists      Patient:  Manuel Jessica  YOB: 1925  Date of Service: 12/8/2020  MRN: [id number]   Acct: [de-identified]   Primary Care Physician: Lizabeth Mcleod MD  Advance Directive: Full Code  Admit Date: 12/6/2020       Hospital Day: 2    CHIEF COMPLAINT Vomiting    SUBJECTIVE:     Pt is laying in bed in no acute distress. He states that his nausea and vomiting is better. He denies any diarrhea today. Abdomen is non-tender upon palpation. Pt does have a cough. No overnight events. Cumulative Hospital Course:   12/06/20 The patient is a 80 y.o. male with PMH CAD, CHF, CKD 3, Colon cancer, DM II, Diastolic CHF, seizures who presented to [facility] ED from [facility] with one episode of nausea and vomiting. He is a poor historian and has underlying dementia. He was tested for COVID-19 which was negative. Was recently started on Lexapro and nursing home concerned that this is what caused vomiting. Mr. Sasha Gonzalez has no complaints other than he feels cold. Denies dyspnea, chest pain, heart palpitations, abdominal pain or diarrhea. Work up in ED concerning for pneumonia with leukocytosis as well as possible colitis in proximal colon. Additionally CT abdomen revealed a focal wall thickening of ascending colon measuring about 10 cm in length. Mr. Sasha Gonzalez does have known history of colon cancer. 12/07/20 GI consulted. Continue on cipro and flagyl. WBC improving. Electrolytes stable. Creatinine down to 2.0 from 2.1. C. Diff and stool cultures sent. 12/08/20 WBC and creatinine continuing to improve. Stool cultures pending. Continuing on IV abx. Awaiting SNF placement. GI recommending continuation of Cipro and Flagyl for a minimum of 10 days. Pt has been accepted to [facility] and can be discharged tomorrow is remains medically stable. Review of Systems:   Review of Systems   Constitutional: Negative for chills, diaphoresis, fatigue and fever.    HENT: Negative for congestion, ear pain, sinus pressure, sinus pain and sore throat. Eyes: Negative for photophobia, pain and itching. Respiratory: Positive for cough. Negative for chest tightness, shortness of breath and wheezing. Cardiovascular: Negative for chest pain, palpitations and leg swelling. Gastrointestinal: Negative for abdominal distention, abdominal pain, diarrhea, nausea and vomiting. Endocrine: Negative for cold intolerance and heat intolerance. Genitourinary: Negative for difficulty urinating, dysuria, flank pain, frequency and urgency. Musculoskeletal: Negative for arthralgias, joint swelling and myalgias. Neurological: Negative for dizziness, tremors, syncope, weakness, light-headedness, numbness and headaches. Hematological: Does not bruise/bleed easily. Psychiatric/Behavioral: Negative for agitation, confusion, dysphoric mood, hallucinations, self-injury and suicidal ideas. Objective:   VITALS:  /60   Pulse 75   Temp 96.6 °F (35.9 °C) (Temporal)   Resp 18   Ht 5' 9\" (1.753 m)   Wt 224 lb (101.6 kg)   SpO2 92%   BMI 33.08 kg/m²   24HR INTAKE/OUTPUT:      Intake/Output Summary (Last 24 hours) at 12/8/2020 0955  Last data filed at 12/8/2020 0457  Gross per 24 hour   Intake 2924 ml   Output 300 ml   Net 2624 ml       Physical Exam  Constitutional:       General: He is not in acute distress. Appearance: Normal appearance. He is not ill-appearing, toxic-appearing or diaphoretic. HENT:      Head: Normocephalic and atraumatic. Right Ear: External ear normal.      Left Ear: External ear normal.      Nose: Nose normal.      Mouth/Throat:      Mouth: Mucous membranes are moist.      Pharynx: Oropharynx is clear. Eyes:      General: No scleral icterus. Extraocular Movements: Extraocular movements intact. Conjunctiva/sclera: Conjunctivae normal.   Neck:      Musculoskeletal: Normal range of motion and neck supple.    Cardiovascular:      Rate and Rhythm: Normal rate and regular rhythm. Pulses: Normal pulses. Heart sounds: Normal heart sounds. No murmur. No friction rub. No gallop. Pulmonary:      Effort: Pulmonary effort is normal.      Breath sounds: Normal breath sounds. No stridor. No wheezing or rhonchi. Abdominal:      General: Abdomen is flat. Bowel sounds are normal. There is no distension. Palpations: Abdomen is soft. There is no mass. Tenderness: There is no abdominal tenderness. Hernia: No hernia is present. Musculoskeletal:         General: No swelling or signs of injury. Right lower leg: Edema present. Left lower leg: Edema present. Skin:     General: Skin is warm and dry. Coloration: Skin is not jaundiced or pale. Findings: No erythema, lesion or rash. Neurological:      General: No focal deficit present. Mental Status: He is alert and oriented to person, place, and time. Cranial Nerves: No cranial nerve deficit. Sensory: No sensory deficit. Motor: No weakness.    Psychiatric:         Mood and Affect: Mood normal.         Behavior: Behavior normal.            Medications:      sodium chloride 75 mL/hr at 12/07/20 1118    dextrose        ciprofloxacin  400 mg Intravenous Q12H    insulin lispro  0-18 Units Subcutaneous TID WC    insulin lispro  0-9 Units Subcutaneous Nightly    atorvastatin  10 mg Oral Nightly    apixaban  2.5 mg Oral BID    ezetimibe  10 mg Oral Daily    timolol  1 drop Both Eyes BID    sodium chloride flush  10 mL Intravenous 2 times per day    metroNIDAZOLE  500 mg Intravenous Q8H    insulin glargine  10 Units Subcutaneous BID     nitroGLYCERIN, sodium chloride flush, acetaminophen **OR** acetaminophen, polyethylene glycol, promethazine **OR** ondansetron, glucose, dextrose, glucagon (rDNA), dextrose, albuterol  DIET FULL LIQUID; Carb Control: 4 carb choices (60 gms)/meal     Lab and other Data:     Recent Labs     12/06/20  1433 12/07/20  0320 12/08/20  0400   WBC 24.8* 20.0* 16.4*   HGB 11.6* 11.2* 10.7*    196 184     Recent Labs     12/06/20  1433 12/07/20  0320 12/08/20  0400   * 138 137   K 4.9 4.4 4.3   CL 96* 103 103   CO2 25 22 22   BUN 52* 52* 43*   CREATININE 2.1* 2.0* 1.6*   GLUCOSE 333* 300* 261*     Recent Labs     12/06/20  1433   AST 17   ALT 10   BILITOT 0.6   ALKPHOS 70     INR:   Recent Labs     12/06/20  1433   INR 1.48*       RAD:   Ct Abdomen Pelvis Wo Contrast Additional Contrast? None  Result Date: 12/6/2020  1. There is focal wall thickening of the ascending colon measuring about 10 cm in length. There is stranding of the adjacent fat. This is probably a focal area of colitis involving the proximal colon. There are no significant diverticula identified in the area. Colon cancer is not fully ruled out. Small hiatal hernia. GI follow-up recommended. 2. Prior cholecystectomy. 3. Probable 5.4 cm cyst in the upper pole left kidney, not fully evaluated without contrast. Probable 4.6 cm parapelvic renal cyst on the left versus an extrarenal pelvis. Ureters are nondilated. Urinary bladder unremarkable. 4. Diverticulosis of the colon predominantly in the sigmoid region. 5. Prominent duodenal diverticulum. Atheromatous disease of the aortoiliac vessels and coronary arteries. Mild cardiomegaly. 6. Prior prostatectomy. 7. Other nonacute findings, as discussed above. The full report of this exam was immediately signed and available to the emergency room. The patient is currently in the emergency room. Signed by Dr Franca Ferrer on 12/6/2020 6:07 PM    Xr Chest Portable  Result Date: 12/6/2020  1. Bibasilar infiltrate or atelectasis with evidence of a small amount of left-sided pleural fluid.  Signed by Dr Jordyn Valdez on 12/6/2020 3:25 PM      Micro:   Respiratory panel with covid- Negative  Blood cultres- no growth to date  Stool cultures- sent  C diff- sent  Sputum culture- no resut    Assessment/Plan   Principal Problem:    Colitis w/ known hx colon cancer, No N/V or diarrhea reported this far. Continue empiric antibiotics for now with leukocytosis. Check stool culture and C diff. Gi recommending conntinue Cipro and Flagyl for 10 days and to follow up OP for possible endoscopy.      Active Problems:    Pneumonia vs atelectasis- mild hypoxia noted on room air, COVID-19 negative. Add Duonebs, check blood/sputum cultures, supplemental oxygen as needed, add IS       MICHELLE superimposed on CKD 3-continue gentle IVF's to avoid volume overload. Patient does have BLE edema which is chronic per documentation per his PCP.        Coronary artery disease involving native coronary artery without angina pectoris w/ hx complete heart block s/p pacemaker placement-no complaint of chest pain, stable       Anemia in CKD (chronic kidney disease)-stable       Hypertension-hold antihypertensives for now and monitor. Stable        Diabetes mellitus (HCC)-SSI, accuchecks, hypoglycemia tx orders.  This has been poorly controlled as OP but due to patient's advancing dementia and he is resident at a nursing home it has been difficult to gain adequate control        Dementia-noted, further orders per clinical course/attenidng     DVT prophylaxis- Miguel Simpson PA-C  12/8/2020, 9:55 AM

## 2020-12-08 NOTE — CARE COORDINATION
SW met with Pt POA regarding rehab options in the area. Pt POA requested placement with [facility] and [address] [facility]. [address] [facility] does not have a male skilled bed at this time. SW will reached out to [facility] regarding possible acceptance.    [facility]   [phone]  Electronically signed by Alden Maxwell on 12/8/2020 at 9:37 AM

## 2020-12-08 NOTE — PROGRESS NOTES
GI  - PROGRESS NOTE    Subjective:   Admit Date: 12/6/2020  PCP: Abdirizak Rice MD    CC: Colitis    Pt seen and examined. Patient denies any hematemesis, coffee-ground emesis, abdominal pain, and bright red blood per rectum. He remained clinically stable overnight with no acute events        Medications:  Scheduled Meds:   ciprofloxacin  400 mg Intravenous Q12H    insulin lispro  0-18 Units Subcutaneous TID WC    insulin lispro  0-9 Units Subcutaneous Nightly    atorvastatin  10 mg Oral Nightly    apixaban  2.5 mg Oral BID    ezetimibe  10 mg Oral Daily    timolol  1 drop Both Eyes BID    sodium chloride flush  10 mL Intravenous 2 times per day    metroNIDAZOLE  500 mg Intravenous Q8H    insulin glargine  10 Units Subcutaneous BID       Continuous Infusions:   sodium chloride 75 mL/hr at 12/07/20 1118    dextrose         PRN Meds:.nitroGLYCERIN, sodium chloride flush, acetaminophen **OR** acetaminophen, polyethylene glycol, promethazine **OR** ondansetron, glucose, dextrose, glucagon (rDNA), dextrose, albuterol    Allergies: Patient has no known allergies. Labs:     Recent Labs     12/06/20  1433 12/07/20  0320 12/08/20  0400   WBC 24.8* 20.0* 16.4*   RBC 3.68* 3.55* 3.37*   HGB 11.6* 11.2* 10.7*   HCT 35.8* 35.0* 33.4*   MCV 97.3* 98.6* 99.1*   MCH 31.5* 31.5* 31.8*   MCHC 32.4* 32.0* 32.0*    196 184     Recent Labs     12/06/20  1433 12/07/20  0320 12/08/20  0400   * 138 137   K 4.9 4.4 4.3   ANIONGAP 13 13 12   CL 96* 103 103   CO2 25 22 22   BUN 52* 52* 43*   CREATININE 2.1* 2.0* 1.6*   GLUCOSE 333* 300* 261*   CALCIUM 9.3 8.3 7.7*     No results for input(s): MG, PHOS in the last 72 hours.   Recent Labs     12/06/20  1433   AST 17   ALT 10   BILITOT 0.6   ALKPHOS 70     HgBA1c:  No components found for: HGBA1C  FLP:    Lab Results   Component Value Date    TRIG 118 08/25/2020    HDL 34 08/25/2020    LDLCALC 77 08/25/2020     TSH:    Lab Results   Component Value Date    TSH 4.96 05/10/2016     Troponin T: No results for input(s): TROPONINI in the last 72 hours. INR:   Recent Labs     12/06/20  1433   INR 1.48*       Recent Labs     12/06/20  1433   LIPASE 13     -----------------------------------------------------------------  RAD:   Ct Abdomen Pelvis Wo Contrast Additional Contrast? None    Result Date: 12/6/2020  EXAMINATION:  CT ABDOMEN PELVIS WO CONTRAST  12/6/2020 5:58 PM HISTORY: Elevated white blood cell count. Vomiting. TECHNIQUE: Spiral CT was performed of the abdomen and pelvis without contrast. Multiplanar images were reconstructed. DLP: 8826 mGy-cm. Automated exposure control was utilized. COMPARISON: No comparison study. LUNG BASES: There is coronary artery calcification. There is mild cardiomegaly. 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 There is dependent atelectasis in both lung bases. LIVER AND SPLEEN: Prior cholecystectomy. Unenhanced liver unremarkable. No significant biliary distention. Unenhanced spleen unremarkable. PANCREAS: There is a 5.2 mm duodenal diverticulum adjacent to the head of the pancreas. The unenhanced pancreas is unremarkable. KIDNEYS AND ADRENALS: The adrenal glands are unremarkable. There is a 5.4 cm probable cyst in the upper pole left kidney. There is a probable parapelvic renal cyst on the left versus an extrarenal pelvis measuring 4.6 cm. The unenhanced right kidney is unremarkable. The ureters are nondilated. The urinary bladder is unremarkable. BOWEL: No oral contrast was given. There is a small hiatal hernia. There is fluid in the stomach. There is no gastric wall thickening. Small bowel loops are nondilated. There is focal wall thickening involving the ascending colon. There is stranding of the adjacent fat. This area measures about 10.7 cm in length. There is diverticulosis of the colon predominantly in the sigmoid region. The appendix is normal. OTHER: There is atheromatous disease of the aortoiliac vessels.  There are degenerative changes of the spine and hips. There is mild compression deformity of L1 that is likely chronic in age. There has been prior prostatectomy. 1. There is focal wall thickening of the ascending colon measuring about 10 cm in length. There is stranding of the adjacent fat. This is probably a focal area of colitis involving the proximal colon. There are no significant diverticula identified in the area. Colon cancer is not fully ruled out. Small hiatal hernia. GI follow-up recommended. 2. Prior cholecystectomy. 3. Probable 5.4 cm cyst in the upper pole left kidney, not fully evaluated without contrast. Probable 4.6 cm parapelvic renal cyst on the left versus an extrarenal pelvis. Ureters are nondilated. Urinary bladder unremarkable. 4. Diverticulosis of the colon predominantly in the sigmoid region. 5. Prominent duodenal diverticulum. Atheromatous disease of the aortoiliac vessels and coronary arteries. Mild cardiomegaly. 6. Prior prostatectomy. 7. Other nonacute findings, as discussed above. The full report of this exam was immediately signed and available to the emergency room. The patient is currently in the emergency room. Signed by Dr Tammy Salas on 12/6/2020 6:07 PM    Xr Chest Portable    Result Date: 12/6/2020  XR CHEST PORTABLE 12/6/2020 3:24 PM History: Vomiting. Elevated white blood cell count. Portable chest x-ray compared with 14 January 2019. Chronic interstitial lung disease. No pneumothorax or heart failure. Patchy bibasilar infiltrate or atelectasis. Opacity at the left lateral costophrenic angle compatible with small pleural effusion. The upper lobes are clear. Heart size is magnified by the portable projection. CABG changes are present. Unchanged left cardiac pacer. 1. Bibasilar infiltrate or atelectasis with evidence of a small amount of left-sided pleural fluid.  Signed by Dr Shereen Arias on 12/6/2020 3:25 PM    Vl Dup Lower Extremity Venous Bilateral    Result Date: 12/7/2020  Vascular Lower Extremities DVT Study Procedure  Demographics   Patient Name     Tatiana Bhardwaj Age                   89   Patient Number   [id number]          Gender                Male   Visit Number     [id number]       Interpreting          Shyanne Farris MD                                   Physician   Date of Birth    01/05/1925      Referring Physician   Sukhwinder Cohen   Accession Number [id number]      [address] [facility] [[email or web address]]  Procedure Type of Study:   Veins:Lower Extremities DVT Study, VL LOWER EXTREMITY BILATERAL VENOUS  DUPLEX . Indications for Study:Pain, left lower extremity. Allergies   - No known allergies. Impression   There is no evidence of deep venous thrombosis (DVT) in the bilateral  lower extremity(ies). There is no evidence of superficial thrombophlebitis of the bilateral  lower extremity(ies). Evidence of reflux in the deep veins of the left lower extremity. The exam is technically limited due to dence tissue and poor cooperation  by patient. ; therefore the bilateral tibial veins were not well  visualized. Signature   ----------------------------------------------------------------  Electronically signed by Shyanne Farris MD(Interpreting  physician) on 12/07/2020 05:56 PM  ----------------------------------------------------------------  Velocities are measured in cm/s ; Diameters are measured in mm Right Lower Extremities DVT Study Measurements Right 2D Measurements +------------------------------------+----------+---------------+----------+ ! Location                            ! Visualized! Compressibility! Thrombosis! +------------------------------------+----------+---------------+----------+ ! Sapheno Femoral Junction            ! Yes       ! Yes            ! None      ! +------------------------------------+----------+---------------+----------+ ! Common Femoral                      !Yes       ! Yes            ! None      ! +------------------------------------+----------+---------------+----------+ ! Prox Femoral                        !Yes       ! Yes            ! None      ! +------------------------------------+----------+---------------+----------+ ! Mid Femoral                         !Yes       ! Yes            ! None      ! +------------------------------------+----------+---------------+----------+ ! Dist Femoral                        !Yes       ! Yes            ! None      ! +------------------------------------+----------+---------------+----------+ ! Deep Femoral                        !Yes       ! Yes            ! None      ! +------------------------------------+----------+---------------+----------+ ! Popliteal                           !Yes       ! Yes            ! None      ! +------------------------------------+----------+---------------+----------+ ! SSV                                 ! Partial   !Yes            ! None      ! +------------------------------------+----------+---------------+----------+ ! Gastroc                             ! Partial   !Yes            ! None      ! +------------------------------------+----------+---------------+----------+ ! PTV                                 ! Partial   !Yes            ! None      ! +------------------------------------+----------+---------------+----------+ ! GSV                                 ! Yes       ! Yes            ! None      ! +------------------------------------+----------+---------------+----------+ ! ATV                                 ! Partial   !Yes            ! None      ! +------------------------------------+----------+---------------+----------+ ! Peroneal                            !No        !               !          ! +------------------------------------+----------+---------------+----------+ Left Lower Extremities DVT Study Measurements Left 2D Measurements +------------------------------------+----------+---------------+----------+ ! Location !Visualized! Compressibility! Thrombosis! +------------------------------------+----------+---------------+----------+ ! Sapheno Femoral Junction            ! Yes       ! Yes            ! None      ! +------------------------------------+----------+---------------+----------+ ! Common Femoral                      !Partial   !Yes            ! None      ! +------------------------------------+----------+---------------+----------+ ! Prox Femoral                        !Yes       ! Yes            ! None      ! +------------------------------------+----------+---------------+----------+ ! Mid Femoral                         !Yes       ! Yes            ! None      ! +------------------------------------+----------+---------------+----------+ ! Dist Femoral                        !Yes       ! Yes            ! None      ! +------------------------------------+----------+---------------+----------+ ! Deep Femoral                        !Yes       ! Yes            ! None      ! +------------------------------------+----------+---------------+----------+ ! Popliteal                           !Partial   !Yes            ! None      ! +------------------------------------+----------+---------------+----------+ ! SSV                                 ! Partial   !Yes            ! None      ! +------------------------------------+----------+---------------+----------+ ! Gastroc                             ! Partial   !Yes            ! None      ! +------------------------------------+----------+---------------+----------+ ! PTV                                 ! Partial   !Yes            ! None      ! +------------------------------------+----------+---------------+----------+ ! GSV                                 ! Partial   !Yes            ! None      ! +------------------------------------+----------+---------------+----------+ ! ATV                                 ! Partial   !Yes            ! None      ! +------------------------------------+----------+---------------+----------+ ! Peroneal                            !No        !               ! Acute     ! +------------------------------------+----------+---------------+----------+ Left Doppler Measurements +--------+------+------+---------------------------------------------------+ ! Location! Signal!Reflux! Reflux (msec)                                      ! +--------+------+------+---------------------------------------------------+ ! PTV     !      !      !1423                                               !  +--------+------+------+---------------------------------------------------+      Physical Exam:     Vitals:    12/07/20 1503 12/07/20 2019 12/08/20 0014 12/08/20 0457   BP:  (!) 121/57 135/66 134/60   Pulse:  71 67 75   Resp: 16 16 16 18   Temp:  97.2 °F (36.2 °C) 96.5 °F (35.8 °C) 96.6 °F (35.9 °C)   TempSrc:  Temporal Temporal Temporal   SpO2: 93% 94% 91% 92%   Weight:       Height:         24HR INTAKE/OUTPUT:      Intake/Output Summary (Last 24 hours) at 12/8/2020 1324  Last data filed at 12/8/2020 0457  Gross per 24 hour   Intake 2684 ml   Output 300 ml   Net 2384 ml       Constitutional: [x] NAD, [x] of stated age, [x] well nourished  Eyes: [x] conjunctiva clear, [x] Non inflamed irises, [x] no scleral icterus  ENT/Mouth: [x]  Nares patent with pink mucosa, [x] oropharynx clear without exudates or erythema, [x] hearing deficit  Head/Neck: [x] symmetrical, [x] supple  Lungs: [x] respirations non labored with good effort, [x] no wheezing, [x]  Equal air entry bilaterally  Heart: [[x]  Systolic murmur, [x] pedal pulses preserved 2/4 bilaterally, [x] LE edema  Abdomen: [x] +BSx4, [x] NTND, [x] soft, [x] no guarding, [x] no peritoneal signs, [x] vertical surgical scar  Musculoskeletal: [x]  Normal nails bilaterally, [x] Normal digits bilaterally, [x] no muscle atrophy  Skin/SubQ: [x] No jaundice, [x] warm, dry skin, [x] no rashes on inspection  Psychiatric: [x] Orientated to person, place, and time; [x] mood and affect unremarkable, [x] memory recent and remote intact      Impression:       1. Colitis  2. Hx colon cancer  3. Small left sided pleural effusion    Plan:   -Patient remained clinically stable overnight and he does continue to have reduction in his leukocytosis which is now at 16.4. Recommend continuation of his Cipro and Flagyl for minimum of 10 days. As previously mentioned, patient does have a history of colon cancer though with his advanced age if any significant findings on endoscopic evaluation he would likely not be a surgical candidate.  I would recommend after patient recovers from his acute colitis, he speak with his outpatient GI physician to determine the value of any endoscopic evaluation.  -We will continue to follow    Terri Madrid, DO

## 2020-12-08 NOTE — PROGRESS NOTES
Physical Therapy   Name: Rick Hyde  MRN:  [id number]  Date of service:  12/7/2020  Pt. off the floor for testing. Will f/u at a later time.   Electronically signed by Jenifer Muñiz PT on 12/7/2020 at 6:44 PM

## 2020-12-08 NOTE — TELEPHONE ENCOUNTER
[facility] called requesting a refill of the below medication which has been pended for you:     Requested Prescriptions     Pending Prescriptions Disp Refills    atorvastatin (LIPITOR) 10 MG tablet [Pharmacy Med Name: ATORVASTATIN TABS 10MG] 90 tablet 3     Sig: TAKE 1 TABLET DAILY AT BEDTIME       Last Appointment Date: 9/1/2020  Next Appointment Date: 1/28/21  No Known Allergies TSH level is slightly low. Will have her FU with PCP about this. Vitamin D level is low. Will send in script for Vitamin D 50,000 units once per week for next 4 months. Will have her FU with PCP and recheck level after therapy completed. Left patient detailed voice message.

## 2020-12-08 NOTE — PROGRESS NOTES
Occupational Therapy  Facility/Department: [facility]  Daily Treatment Note  NAME: Mattie Wilkins  : 1925  MRN: [id number]    Date of Service: 2020    Discharge Recommendations:  Patient would benefit from continued therapy after discharge       Assessment   Performance deficits / Impairments: Decreased functional mobility   Assessment: Pt able to stand twice with max assist of 2, unable to take any steps due to pain in L lower leg. The patient has significantly decreased mobility and ADL which is limited primarily by calf pain and swelling in left leg. Will continue to follow. Treatment Diagnosis: Colitis, weakness  History: CHF, CKD, CAD  Activity Tolerance  Activity Tolerance: Patient limited by pain         Patient Diagnosis(es): The primary encounter diagnosis was Non-intractable vomiting with nausea, unspecified vomiting type. Diagnoses of Colitis and Pneumonia due to organism were also pertinent to this visit. has a past medical history of Arthritis, CAD (coronary artery disease), Cancer ([address] CHF (congestive heart failure) ([address] CKD (chronic kidney disease) stage 3, GFR 30-59 ml/min, Colon cancer ([address] Diabetes mellitus ([address] Diastolic heart failure ([address] H/O partial resection of colon, Hypertension, Pacemaker, Palliative care patient, Pneumonia, Prostate CA ([address] Pure hypercholesterolemia, Seizures ([address] Sick sinus syndrome ([address] and Skin cancer. has a past surgical history that includes Cardiac surgery; Cholecystectomy; Colon surgery; colectomy; and pr debridement, skin, sub-q tissue,=<20 sq cm (Left, 2018).     Restrictions  Restrictions/Precautions  Restrictions/Precautions: Fall Risk  Subjective   General  Chart Reviewed: Yes  Patient assessed for rehabilitation services?: Yes  Response to previous treatment: Patient with no complaints from previous session  Family / Caregiver Present: Yes(Daughter)  Pain Assessment  Pain Assessment: Faces  Perera-Baker Pain Rating:

## 2020-12-09 NOTE — CARE COORDINATION
FYI, the [facility] Predict Tool is available for view under the Media tab for this patient. The [facility] Predict Tool is offered as a suggestion for the best disposition for this patient at the time of discharge based on their care needs.

## 2020-12-09 NOTE — DISCHARGE INSTR - DIET

## 2020-12-09 NOTE — PROGRESS NOTES
12/09/20 1706   General Comment   Comments No TX today patient C/O   L ankle pain.   X-ray   has been ordered to R/O FX   Physical Therapy    Electronically signed by Jimmy Estrella PTA on 12/9/2020 at 1:51 PM daughter-in law

## 2020-12-09 NOTE — DISCHARGE SUMMARY
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 *  Portions of this note have been copied forward, however, changed to reflect the most current clinical status of this patient. Hospital Course:    20 The patient is a 89 y. o. male with PMH CAD, CHF, CKD 3, Colon cancer, DM II, Diastolic CHF, seizures who presented to [facility] ED from [address] [facility] with one episode of nausea and vomiting. He is a poor historian and has underlying dementia. He was tested for COVID-19 which was negative. Was recently started on Lexapro and nursing home concerned that this is what caused vomiting. Mr. Sasha Gonzalez has no complaints other than he feels cold. Denies dyspnea, chest pain, heart palpitations, abdominal pain or diarrhea. Work up in ED concerning for pneumonia with leukocytosis as well as possible colitis in proximal colon. Additionally CT abdomen revealed a focal wall thickening of ascending colon measuring about 10 cm in length. Mr. Sasha Gonzalez does have known history of colon cancer. 20 GI consulted. Continue on cipro and flagyl. WBC improving. Electrolytes stable. Creatinine down to 2.0 from 2.1. C. Diff and stool cultures sent. 20 WBC and creatinine continuing to improve. Stool cultures pending. Continuing on IV abx. Awaiting SNF placement.  GI recommending continuation of Cipro and Flagyl for a minimum of 10 days. Pt has been accepted to [facility] and can be discharged tomorrow is remains medically stable.     12/09/20 Pt continues to improve clinically. Complains of left ankle pain. XR L ankle ordered and shows no fracture. Uric acid level within normal limits. Pt is stable and ready for discharge to SNF per attending hospitalist. Pt is to continue on Cipro and Flagyl x 10 days. Significant Diagnostic Studies:   Ct Abdomen Pelvis Wo Contrast Additional Contrast? None  Result Date: 12/6/2020  1. There is focal wall thickening of the ascending colon measuring about 10 cm in length. There is stranding of the adjacent fat. This is probably a focal area of colitis involving the proximal colon. There are no significant diverticula identified in the area. Colon cancer is not fully ruled out. Small hiatal hernia. GI follow-up recommended. 2. Prior cholecystectomy. 3. Probable 5.4 cm cyst in the upper pole left kidney, not fully evaluated without contrast. Probable 4.6 cm parapelvic renal cyst on the left versus an extrarenal pelvis. Ureters are nondilated. Urinary bladder unremarkable. 4. Diverticulosis of the colon predominantly in the sigmoid region. 5. Prominent duodenal diverticulum. Atheromatous disease of the aortoiliac vessels and coronary arteries. Mild cardiomegaly. 6. Prior prostatectomy. 7. Other nonacute findings, as discussed above. The full report of this exam was immediately signed and available to the emergency room. The patient is currently in the emergency room. Signed by Dr Cory Monzon on 12/6/2020 6:07 PM    Xr Chest Portable  Result Date: 12/6/2020  1. Bibasilar infiltrate or atelectasis with evidence of a small amount of left-sided pleural fluid. Signed by Dr Roshan Pike on 12/6/2020 3:25 PM    Vl Dup Lower Extremity Venous Bilateral  Result Date: 12/7/2020  Impression     There is no evidence of deep venous thrombosis (DVT) in the bilateral  lower extremity(ies).   There is no evidence of superficial thrombophlebitis of the bilateral  lower extremity(ies). Evidence of reflux in the deep veins of the left lower extremity. The exam is technically limited due to dence tissue and poor cooperation  by patient. ; therefore the bilateral tibial veins were not well  visualized. Electronically signed by Carol Bah MD(Interpreting  physician) on 12/07/2020 05:56 PM      Pertinent Labs:   CBC:   Recent Labs     12/07/20  0320 12/08/20  0400 12/09/20  0423   WBC 20.0* 16.4* 15.2*   HGB 11.2* 10.7* 10.4*    184 210     BMP:    Recent Labs     12/07/20 0320 12/08/20  0400 12/09/20  0423    137 134*   K 4.4 4.3 4.2    103 103   CO2 22 22 20*   BUN 52* 43* 40*   CREATININE 2.0* 1.6* 1.6*   GLUCOSE 300* 261* 274*     INR:   No results for input(s): INR in the last 72 hours. Physical Exam:  Vital Signs: BP (!) 150/71   Pulse 67   Temp 96.7 °F (35.9 °C) (Temporal)   Resp 16   Ht 5' 9\" (1.753 m)   Wt 224 lb (101.6 kg)   SpO2 92%   BMI 33.08 kg/m²   General appearance:. Alert and Cooperative   HEENT: Normocephalic. Chest: clear to auscultation bilaterally without wheezes or rhonchi. Cardiac: Normal heart tones with regular rate and rhythm, S1, S2 normal. No murmurs, gallops, or rubs auscultated. Abdomen:soft, non-tender; non-distended normal bowel sounds no masses, no organomegaly. Extremities: No clubbing or cyanosis. No peripheral edema. Peripheral pulses palpable. Neurologic: Grossly intact.     Discharge Medications:       Severa Oliva   Home Medication Instructions [id number]    Printed on:12/09/20 1626   Medication Information                      amLODIPine (NORVASC) 5 MG tablet  TAKE 1 TABLET DAILY (MUST KEEP FOLLOW UP FOR ADDITIONAL REFILLS)             atorvastatin (LIPITOR) 10 MG tablet  TAKE 1 TABLET DAILY AT BEDTIME             B-D INS SYR ULTRAFINE 1CC/31G 31G X 5/16\" 1 ML MISC               Bismuth Tribromoph-Petrolatum (XEROFORM PETROLATUM GAUZE 5\"X9\") MISC external pads  Apply 1 each topically every other day             carvedilol (COREG) 3.125 MG tablet  TAKE 1 TABLET TWICE A DAY             ciprofloxacin (CIPRO) 500 MG tablet  Take 1 tablet by mouth 2 times daily for 10 days             Coenzyme Q10 (COQ10 PO)  Take 10 mg by mouth nightly             Continuous Blood Gluc Sensor (FREESTYLE PEPITO 14 DAY SENSOR) MISC  APPLY EVERY 14 DAYS AS DIRECTED             ELIQUIS 2.5 MG TABS tablet  TAKE 1 TABLET TWICE A DAY             escitalopram (LEXAPRO) 10 MG tablet  Take 1 tablet by mouth daily             ezetimibe (ZETIA) 10 MG tablet  TAKE 1 TABLET DAILY             furosemide (LASIX) 40 MG tablet  TAKE 1 TABLET DAILY             Garlic 10 MG CAPS  Take 1 capsule by mouth daily Pt unsure of dose. States it changes because he gets whatever is on sale. insulin glargine (LANTUS SOLOSTAR) 100 UNIT/ML injection pen  INJECT AS DIRECTED PER SLIDING SCALE             insulin lispro, 1 Unit Dial, (HUMALOG KWIKPEN) 100 UNIT/ML SOPN  INJECT AS DIRECTED PER SLIDING SCALE             Insulin Pen Needle 32G X 4 MM MISC  1 each by Does not apply route 4 times daily (before meals and nightly)             isosorbide dinitrate (ISORDIL) 5 MG tablet  TAKE 3 TABLETS TWICE A DAY             meclizine (ANTIVERT) 12.5 MG tablet  Take 12.5 mg by mouth 2 times daily              metroNIDAZOLE (FLAGYL) 250 MG tablet  Take 1 tablet by mouth 2 times daily for 10 days             Multiple Vitamins-Minerals (THERAPEUTIC MULTIVITAMIN-MINERALS) tablet  Take 1 tablet by mouth daily             nitroGLYCERIN (NITROSTAT) 0.4 MG SL tablet  Place 0.4 mg under the tongue every 5 minutes as needed for Chest pain             nystatin (MYCOSTATIN) 409724 UNIT/GM cream  Apply topically 2 times daily.              nystatin (MYCOSTATIN) 862200 UNIT/GM powder  Twice  dly             polyethylene glycol (GLYCOLAX) powder  Take 17 g by mouth daily             Sodium Hypochlorite 0.0125 % SOLN  Apply topically             spironolactone (ALDACTONE) 25 MG tablet  TAKE 1 TABLET DAILY             timolol (TIMOPTIC) 0.5 % ophthalmic solution  Place 1 drop into both eyes 2 times daily              zoster recombinant adjuvanted vaccine (SHINGRIX) 50 MCG/0.5ML SUSR injection  Inject 0.5 mLs into the muscle See Admin Instructions 1 dose now and repeat in 2-6 months                 Discharge Instructions: Follow up with accepting provider upon arrival.  Take medications as directed. Resume activity as tolerated. Diet: DIET FULL LIQUID; Carb Control: 4 carb choices (60 gms)/meal     Disposition: Patient is medically stable and will be discharged [facility]. Time spent on discharge 45 minutes spent in assessing patient, reviewing medications, discussion with nursing, confirming safe discharge plan and preparation of discharge summary.     Signed:  Mika Correia PA-C

## 2020-12-10 NOTE — TELEPHONE ENCOUNTER
SKILLED NURSING FACILITY:   INITIAL CALL POST-HOSPITAL DISCHARGE    SNF: [address] [facility]     PHONE NUMBER: [phone]     / : Mirela Howe    THERAPY: PT/OT    ANTICIPATED LENGTH OF STAY: unknown    Per Mariana, nursing staff at [address] [facility], Mr. Tere Baptiste came in last night for short term rehab. He was initially very agitated. She states his family came in for a visit today and that helped to calm him down. He is there for strengthening and will be getting physical and occupational therapies. There are no plans for discharge at this time.

## 2020-12-17 NOTE — PROGRESS NOTES
Physician Progress Note      PATIENT:               Ori Gutierrez  CSN #:                  [id number]  :                       1925  ADMIT DATE:       2020 12:51 PM  [address] [place] DATE:        2020 9:02 PM  RESPONDING  PROVIDER #:        Aliya RÍOS MD          QUERY TEXT:    Patient admitted with Colitis. Noted documentation of Acute Kidney Injury in    H & P and discharge summary. Please include the clinical indicators that are   present to support the MICHELLE diagnosis. The medical record reflects the following:  Risk Factors: CKD III, Anemia, HTN, Colitis. Clinical Indicators: Creatinine 2.1 to 1.6. Treatment:  ml bolus, IVF 75 ml/hr, labs    Defined by Kidney Disease Improving Global Outcomes (KDIGO) clinical practice   guideline for acute kidney injury:  -Increase in SCr by greater than or equal to 0.3 mg/dl within 48 hours; or  -Increase in SCr to greater than or equal to 1.5 times baseline, which is   known or presumed to have occurred within the prior 7 days; or  -Urine volume < 0.5ml/kg/h for 6 hours  Options provided:  -- Acute kidney injury evidenced by, Please document evidence as well as   baseline creatinine, if known. -- Currently resolved acute kidney injury was evidenced by, Please document   evidence as well as baseline creatinine, if known. -- Acute kidney injury ruled out after study  -- Other - I will add my own diagnosis  -- Disagree - Not applicable / Not valid  -- Disagree - Clinically unable to determine / Unknown  -- Refer to Clinical Documentation Reviewer    PROVIDER RESPONSE TEXT:    This patient has an acute kidney injury as evidenced by Please see H&P and   clinical notes    Query created by: Silvester Villatoro on 2020 3:34 PM      QUERY TEXT:    Pt admitted with and has colitis documented.  Noted nausea and vomiting with   treatment of IV Flagyl and IV Cipro with discharge on these medications po for 10 days. If possible, please document the type of colitis in the medical   record. The medical record reflects the following:  Risk Factors: Hx of Colon cancer. Nausea and vomiting. Diverticulosis in the   sigmoid colon  Clinical Indicators:  WBC 24. Focal wall thickening of the ascending colon. .  Treatment: IV Flagyl, Cipro IV. DC medications Cipro and Flagyl po for 10   days. Thank you    Romina Brumfield RN, BSN, [facility]  [phone]  Options provided:  -- Bacterial Colitis  -- Non bacterial Colitis  -- Other - I will add my own diagnosis  -- Disagree - Not applicable / Not valid  -- Disagree - Clinically unable to determine / Unknown  -- Refer to Clinical Documentation Reviewer    PROVIDER RESPONSE TEXT:    This patient has bacterial colitis.     Query created by: Maulik Bolton on 12/17/2020 3:36 PM      Electronically signed by:  Matthew Flores MD 12/17/2020 4:01 PM

## 2020-12-17 NOTE — TELEPHONE ENCOUNTER
Fernando dumont [phone]    Per Eva Guidry, nursing staff at [facility], patient is doing well. Patient continues to participate in therapy. No known discharge plans at this time.

## 2020-12-19 NOTE — PROGRESS NOTES
Physician Progress Note      PATIENT:               Rebeca Wade  CSN #:                  [id number]  :                       1925  ADMIT DATE:       2020 12:51 PM  [address] [place] DATE:        2020 9:02 PM  RESPONDING  PROVIDER #:        Duy RÍOS MD          QUERY TEXT:    Pt admitted with bacterial colitis. Pt noted leukocytosis with WBC 24.8 and   elevated procalcitonin at 1.40. Pt is 80years old with multiple   comorbidities. If possible, please document in the progress notes and   discharge summary if you are evaluating and /or treating any of the following: The medical record reflects the following:  Risk Factors: Bacterial colitis, DM, CKD, Colon cancer,  Clinical Indicators: nausea and vomiting x1, WBC 24.8, Procalcitonin 1.40, CT   Abdomen , \" There is focal wall thickening of the ascending colon   measuring about 10 cm in length. There is stranding of the adjacent fat. This   is probably a focal area of colitis involving the proximal colon. \"  Treatment: Rocephin, Flagyl  Options provided:  -- Sepsis due to bacterial colitis, present on admission  -- No Sepsis, bacterial colitis only  -- Other - I will add my own diagnosis  -- Disagree - Not applicable / Not valid  -- Disagree - Clinically unable to determine / Unknown  -- Refer to Clinical Documentation Reviewer    PROVIDER RESPONSE TEXT:    This patient has sepsis due to bacterial colitis which was present on   admission.     Query created by: Tracy Rachel on 2020 3:09 PM      Electronically signed by:  Alexis Post MD 2020 8:17 AM

## 2020-12-23 NOTE — TELEPHONE ENCOUNTER
Dianne Mckee [phone]    Per Bindu Garcia,  at [facility], patient is doing well. Patient continues to participate in therapy. No known discharge plans at this time.

## 2020-12-30 NOTE — TELEPHONE ENCOUNTER
Fernando dumont [phone]    Per David Goode, nursing staff at [facility], patient is doing well. Patient continues to participate in therapy. There are plans for him to go back to [facility] but no known discharge date at this time.

## 2021-01-01 ENCOUNTER — OFFICE VISIT (OUTPATIENT)
Dept: INTERNAL MEDICINE | Age: 86
End: 2021-01-01
Payer: MEDICARE

## 2021-01-01 ENCOUNTER — TELEPHONE (OUTPATIENT)
Dept: INTERNAL MEDICINE | Age: 86
End: 2021-01-01

## 2021-01-01 ENCOUNTER — TELEPHONE (OUTPATIENT)
Dept: CARDIOLOGY CLINIC | Age: 86
End: 2021-01-01

## 2021-01-01 ENCOUNTER — TELEPHONE (OUTPATIENT)
Dept: INTERNAL MEDICINE CLINIC | Age: 86
End: 2021-01-01

## 2021-01-01 ENCOUNTER — CARE COORDINATION (OUTPATIENT)
Dept: CASE MANAGEMENT | Age: 86
End: 2021-01-01

## 2021-01-01 ENCOUNTER — HOSPITAL ENCOUNTER (OUTPATIENT)
Dept: WOUND CARE | Age: 86
Discharge: HOME | End: 2021-02-02
Payer: MEDICARE

## 2021-01-01 VITALS
HEART RATE: 60 BPM | RESPIRATION RATE: 20 BRPM | HEIGHT: 68 IN | DIASTOLIC BLOOD PRESSURE: 70 MMHG | OXYGEN SATURATION: 96 % | BODY MASS INDEX: 34.1 KG/M2 | WEIGHT: 225 LBS | SYSTOLIC BLOOD PRESSURE: 110 MMHG

## 2021-01-01 VITALS
RESPIRATION RATE: 20 BRPM | HEART RATE: 65 BPM | DIASTOLIC BLOOD PRESSURE: 64 MMHG | HEIGHT: 68 IN | SYSTOLIC BLOOD PRESSURE: 137 MMHG | TEMPERATURE: 96.7 F | BODY MASS INDEX: 34.1 KG/M2 | WEIGHT: 225 LBS

## 2021-01-01 DIAGNOSIS — L97.509 TYPE 2 DIABETES MELLITUS WITH FOOT ULCER, WITH LONG-TERM CURRENT USE OF INSULIN (HCC): Primary | Chronic | ICD-10-CM

## 2021-01-01 DIAGNOSIS — E11.9 TYPE 2 DIABETES MELLITUS WITHOUT COMPLICATION, WITHOUT LONG-TERM CURRENT USE OF INSULIN (HCC): ICD-10-CM

## 2021-01-01 DIAGNOSIS — L97.512 CHRONIC ULCER OF RIGHT GREAT TOE, WITH FAT LAYER EXPOSED (HCC): Chronic | ICD-10-CM

## 2021-01-01 DIAGNOSIS — I10 ESSENTIAL HYPERTENSION: ICD-10-CM

## 2021-01-01 DIAGNOSIS — L97.522 SKIN ULCER OF GREAT TOE, LEFT, WITH FAT LAYER EXPOSED (HCC): Chronic | ICD-10-CM

## 2021-01-01 DIAGNOSIS — E11.628 DIABETIC FOOT INFECTION (HCC): ICD-10-CM

## 2021-01-01 DIAGNOSIS — E11.9 TYPE 2 DIABETES MELLITUS WITHOUT COMPLICATION, WITH LONG-TERM CURRENT USE OF INSULIN (HCC): Primary | ICD-10-CM

## 2021-01-01 DIAGNOSIS — L97.422 ULCER OF LEFT HEEL, WITH FAT LAYER EXPOSED (HCC): Chronic | ICD-10-CM

## 2021-01-01 DIAGNOSIS — N18.30 STAGE 3 CHRONIC KIDNEY DISEASE, UNSPECIFIED WHETHER STAGE 3A OR 3B CKD (HCC): ICD-10-CM

## 2021-01-01 DIAGNOSIS — L08.9 DIABETIC FOOT INFECTION (HCC): ICD-10-CM

## 2021-01-01 DIAGNOSIS — Z79.4 TYPE 2 DIABETES MELLITUS WITHOUT COMPLICATION, WITH LONG-TERM CURRENT USE OF INSULIN (HCC): Primary | ICD-10-CM

## 2021-01-01 DIAGNOSIS — I44.2 COMPLETE HEART BLOCK (HCC): ICD-10-CM

## 2021-01-01 DIAGNOSIS — Z95.0 PACEMAKER: Primary | ICD-10-CM

## 2021-01-01 DIAGNOSIS — L97.412 CHRONIC HEEL ULCER, RIGHT, WITH FAT LAYER EXPOSED (HCC): Chronic | ICD-10-CM

## 2021-01-01 DIAGNOSIS — Z79.4 TYPE 2 DIABETES MELLITUS WITH FOOT ULCER, WITH LONG-TERM CURRENT USE OF INSULIN (HCC): Primary | Chronic | ICD-10-CM

## 2021-01-01 DIAGNOSIS — S31.829A BUTTOCK WOUND, LEFT, INITIAL ENCOUNTER: Chronic | ICD-10-CM

## 2021-01-01 DIAGNOSIS — E11.621 TYPE 2 DIABETES MELLITUS WITH FOOT ULCER, WITH LONG-TERM CURRENT USE OF INSULIN (HCC): Primary | Chronic | ICD-10-CM

## 2021-01-01 PROCEDURE — 99214 OFFICE O/P EST MOD 30 MIN: CPT | Performed by: SURGERY

## 2021-01-01 PROCEDURE — 93296 REM INTERROG EVL PM/IDS: CPT | Performed by: CLINICAL NURSE SPECIALIST

## 2021-01-01 PROCEDURE — 93294 REM INTERROG EVL PM/LDLS PM: CPT | Performed by: CLINICAL NURSE SPECIALIST

## 2021-01-01 PROCEDURE — 99215 OFFICE O/P EST HI 40 MIN: CPT | Performed by: INTERNAL MEDICINE

## 2021-01-01 PROCEDURE — 1111F DSCHRG MED/CURRENT MED MERGE: CPT | Performed by: INTERNAL MEDICINE

## 2021-01-01 PROCEDURE — 99214 OFFICE O/P EST MOD 30 MIN: CPT

## 2021-01-01 RX ORDER — INSULIN GLARGINE 100 [IU]/ML
15 INJECTION, SOLUTION SUBCUTANEOUS NIGHTLY
COMMUNITY
End: 2021-01-01 | Stop reason: CLARIF

## 2021-01-01 RX ORDER — GUAIFENESIN 600 MG/1
1 TABLET, EXTENDED RELEASE ORAL 2 TIMES DAILY
COMMUNITY
Start: 2021-01-01 | End: 2021-01-01

## 2021-01-01 RX ORDER — ROSUVASTATIN CALCIUM 5 MG/1
TABLET, COATED ORAL
COMMUNITY
Start: 2021-01-01 | End: 2021-01-01 | Stop reason: CLARIF

## 2021-01-01 RX ORDER — SPIRONOLACTONE 25 MG/1
25 TABLET ORAL DAILY
COMMUNITY
Start: 2020-01-01

## 2021-01-01 RX ORDER — ACETAMINOPHEN 325 MG/1
650 TABLET ORAL EVERY 6 HOURS PRN
COMMUNITY

## 2021-01-01 SDOH — ECONOMIC STABILITY: INCOME INSECURITY: HOW HARD IS IT FOR YOU TO PAY FOR THE VERY BASICS LIKE FOOD, HOUSING, MEDICAL CARE, AND HEATING?: NOT HARD AT ALL

## 2021-01-01 SDOH — ECONOMIC STABILITY: TRANSPORTATION INSECURITY
IN THE PAST 12 MONTHS, HAS LACK OF TRANSPORTATION KEPT YOU FROM MEETINGS, WORK, OR FROM GETTING THINGS NEEDED FOR DAILY LIVING?: NO

## 2021-01-01 SDOH — ECONOMIC STABILITY: TRANSPORTATION INSECURITY
IN THE PAST 12 MONTHS, HAS THE LACK OF TRANSPORTATION KEPT YOU FROM MEDICAL APPOINTMENTS OR FROM GETTING MEDICATIONS?: NO

## 2021-01-01 SDOH — ECONOMIC STABILITY: FOOD INSECURITY: WITHIN THE PAST 12 MONTHS, YOU WORRIED THAT YOUR FOOD WOULD RUN OUT BEFORE YOU GOT MONEY TO BUY MORE.: NEVER TRUE

## 2021-01-01 ASSESSMENT — ENCOUNTER SYMPTOMS
EYE ITCHING: 0
ABDOMINAL PAIN: 0
SORE THROAT: 0
VOMITING: 0
ABDOMINAL DISTENTION: 0
BLOOD IN STOOL: 0
SHORTNESS OF BREATH: 0
BACK PAIN: 0
SINUS PRESSURE: 0
EYE DISCHARGE: 0
WHEEZING: 0
TROUBLE SWALLOWING: 0
NAUSEA: 0

## 2021-01-01 ASSESSMENT — PATIENT HEALTH QUESTIONNAIRE - PHQ9
2. FEELING DOWN, DEPRESSED OR HOPELESS: 0
SUM OF ALL RESPONSES TO PHQ QUESTIONS 1-9: 0

## 2021-01-14 NOTE — TELEPHONE ENCOUNTER
Fernando dumont [phone]    Per Natanael Dunham, nursing staff at [facility], patient is doing well. Patient continues to participate in therapy. If therapy approves, he may get to go home end of next week.

## 2021-02-02 PROBLEM — M21.611 BUNION OF GREAT TOE OF RIGHT FOOT: Status: ACTIVE | Noted: 2019-02-12

## 2021-02-02 PROBLEM — L97.512 CHRONIC ULCER OF RIGHT GREAT TOE, WITH FAT LAYER EXPOSED (HCC): Chronic | Status: ACTIVE | Noted: 2021-01-01

## 2021-02-02 PROBLEM — L97.422 ULCER OF LEFT HEEL, WITH FAT LAYER EXPOSED (HCC): Chronic | Status: ACTIVE | Noted: 2021-01-01

## 2021-02-02 PROBLEM — L97.412 CHRONIC HEEL ULCER, RIGHT, WITH FAT LAYER EXPOSED (HCC): Chronic | Status: ACTIVE | Noted: 2021-01-01

## 2021-02-02 PROBLEM — S31.829A BUTTOCK WOUND, LEFT, INITIAL ENCOUNTER: Chronic | Status: ACTIVE | Noted: 2021-01-01

## 2021-02-02 PROBLEM — L97.522 SKIN ULCER OF GREAT TOE, LEFT, WITH FAT LAYER EXPOSED (HCC): Chronic | Status: ACTIVE | Noted: 2021-01-01

## 2021-02-02 NOTE — PROGRESS NOTES
WOUND CARE HISTORY AND PHYSICAL    Patient Care Team:  Cira Soto MD as PCP - General (Internal Medicine)  Cira Soto MD as PCP - [facility]  Anabelle Boo as Nurse Practitioner (Certified Clinical Nurse Specialist)     CC:     Ike Manuel is a 80 y.o. male who presents today for wound evaluation.     Wound Type: pressure  Wound Location: left buttocks, 1st toe bilaterally and R and L heels  Modifying factors: chronic pressure and decreased mobility    Patient Active Problem List   Diagnosis Code    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, with long-term current use of insulin (HCC) E11.621, L97.509, Z79.4    Obesity due to excess calories E66.09    Coronary artery disease involving native coronary artery without angina pectoris I25.10    Glaucoma H40.9    Pure hypercholesterolemia E78.00    Benign prostatic hyperplasia N40.0    CKD (chronic kidney disease) stage 3, GFR 30-59 ml/min N18.30    Anemia in CKD (chronic kidney disease) N18.9, D63.1    Mobitz type 2 second degree heart block I44.1    Pacemaker Z95.0    History of prostate cancer Z80.47    Male stress incontinence N39.3    Chronic fatigue R53.82    Diabetic foot infection (HCC) E11.628, L08.9    Diabetic ulcer of left midfoot associated with type 2 diabetes mellitus, with fat layer exposed ([address] E11.621, L97.422    Pneumonia J18.9    Hypertension I10    Diabetes mellitus ([address] E11.9    Palliative care patient Z51.5    Bunion of great toe of right foot M21.611    History of pulmonary embolus (PE) Z86.711    Chronic dermatitis L30.9    Colitis K52.9    MICHELLE (acute kidney injury) ([address] N17.9    Chronic ulcer of right great toe, with fat layer exposed ([address] L97.512    Skin ulcer of great toe, left, with fat layer exposed ([address] L97.522    Ulcer of left heel, with fat layer exposed ([address] L97.422    Chronic heel ulcer, right, with fat layer exposed ([address] L97.412    Buttock wound, left, initial encounter X15.404E       HPI:  He reports he developed a wound on B great toes,B heels, and L buttock. This started 2 month(s) ago. He believes this is not healing. He has been applying nothing. He has not had  fever or chills. He has/with diabetes mellitus.     Sudhakar Dodd is a 80 y.o. male with the following history reviewed and recorded in [facility]:  Patient Active Problem List    Diagnosis Date Noted    Chronic ulcer of right great toe, with fat layer exposed ([address] 02/02/2021    Skin ulcer of great toe, left, with fat layer exposed ([address] 02/02/2021    Ulcer of left heel, with fat layer exposed ([address] 02/02/2021    Chronic heel ulcer, right, with fat layer exposed ([address] 02/02/2021    Buttock wound, left, initial encounter 02/02/2021    Colitis 12/06/2020    MICHELLE (acute kidney injury) ([address] 12/06/2020    History of pulmonary embolus (PE) 01/28/2020    Chronic dermatitis 01/28/2020    Bunion of great toe of right foot 02/12/2019    Pneumonia 01/14/2019    Hypertension     Diabetes mellitus ([address]     Diabetic ulcer of left midfoot associated with type 2 diabetes mellitus, with fat layer exposed ([address] 11/08/2018    Palliative care patient 11/02/2018    Diabetic foot infection ([address] 10/31/2018    Chronic fatigue 10/18/2018    History of prostate cancer 08/15/2018    Male stress incontinence 08/15/2018    Pacemaker     Mobitz type 2 second degree heart block     Obesity due to excess calories 05/10/2016    Coronary artery disease involving native coronary artery without angina pectoris 05/10/2016    Glaucoma 05/10/2016    Pure hypercholesterolemia 05/10/2016    Benign prostatic hyperplasia 05/10/2016     Updating Deprecated Diagnoses      CKD (chronic kidney disease) stage 3, GFR 30-59 ml/min 05/10/2016    Anemia in CKD (chronic kidney disease) 05/10/2016     Updating deleted diagnoses      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, with long-term current use of insulin ([address]      Current Outpatient Medications   Medication Sig Dispense Refill    spironolactone (ALDACTONE) 25 MG tablet Take 25 mg by mouth daily       guaiFENesin (MUCINEX) 600 MG extended release tablet Take 1 tablet by mouth 2 times daily       acetaminophen (TYLENOL) 325 MG tablet Take 650 mg by mouth every 6 hours as needed for Pain      escitalopram (LEXAPRO) 10 MG tablet Take 1 tablet by mouth daily 30 tablet 3    amLODIPine (NORVASC) 5 MG tablet TAKE 1 TABLET DAILY (MUST KEEP FOLLOW UP FOR ADDITIONAL REFILLS) 90 tablet 3    ezetimibe (ZETIA) 10 MG tablet TAKE 1 TABLET DAILY 90 tablet 3    furosemide (LASIX) 40 MG tablet TAKE 1 TABLET DAILY (Patient taking differently: Take 40 mg by mouth daily ) 90 tablet 3    insulin lispro, 1 Unit Dial, (HUMALOG KWIKPEN) 100 UNIT/ML SOPN INJECT AS DIRECTED PER SLIDING SCALE (Patient taking differently: INJECT AS DIRECTED PER SLIDING SCALE If Blood sugar is 140-199=2 units, 200-294=4 units; 250-299=6 units; 300-349=8 units; 350-400=10 units if above 400 give 12 units and call MD) 15 mL 11    insulin glargine (LANTUS SOLOSTAR) 100 UNIT/ML injection pen INJECT AS DIRECTED PER SLIDING SCALE (Patient taking differently: Inject 40 Units into the skin nightly INJECT AS DIRECTED PER SLIDING SCALE) 15 mL 11    carvedilol (COREG) 3.125 MG tablet TAKE 1 TABLET TWICE A  tablet 3    spironolactone (ALDACTONE) 25 MG tablet TAKE 1 TABLET DAILY 90 tablet 3    isosorbide dinitrate (ISORDIL) 5 MG tablet TAKE 3 TABLETS TWICE A  tablet 3    ELIQUIS 2.5 MG TABS tablet TAKE 1 TABLET TWICE A  tablet 4    polyethylene glycol (GLYCOLAX) powder Take 17 g by mouth daily 1 Bottle 5    timolol (TIMOPTIC) 0.5 % ophthalmic solution Place 1 drop into both eyes 2 times daily       Coenzyme Q10 (COQ10 PO) Take 10 mg by mouth nightly      Garlic 10 MG CAPS Take 1 capsule by mouth daily Pt unsure of dose. States it changes because he gets whatever is on sale.       Multiple Vitamins-Minerals (THERAPEUTIC MULTIVITAMIN-MINERALS) tablet Take 1 tablet by mouth daily      meclizine (ANTIVERT) 12.5 MG tablet Take 12.5 mg by mouth 2 times daily       magnesium hydroxide (MILK OF MAGNESIA) 400 MG/5ML suspension Take 30 mLs by mouth      tuberculin 5 UNIT/0.1ML injection Inject 0.1 mLs into the skin      atorvastatin (LIPITOR) 10 MG tablet TAKE 1 TABLET DAILY AT BEDTIME (Patient not taking: Reported on 2/2/2021) 90 tablet 3    Insulin Pen Needle 32G X 4 MM MISC 1 each by Does not apply route 4 times daily (before meals and nightly) 360 each 3    Continuous Blood Gluc Sensor (FREESTYLE PEPITO 14 DAY SENSOR) MISC APPLY EVERY 14 DAYS AS DIRECTED 2 each 5    zoster recombinant adjuvanted vaccine (SHINGRIX) 50 MCG/0.5ML SUSR injection Inject 0.5 mLs into the muscle See Admin Instructions 1 dose now and repeat in 2-6 months 1 each 1    Sodium Hypochlorite 0.0125 % SOLN Apply topically      Bismuth Tribromoph-Petrolatum (XEROFORM PETROLATUM GAUZE 5\"X9\") MISC external pads Apply 1 each topically every other day 15 each 0    B-D INS SYR ULTRAFINE 1CC/31G 31G X 5/16\" 1 ML MISC       nitroGLYCERIN (NITROSTAT) 0.4 MG SL tablet Place 0.4 mg under the tongue every 5 minutes as needed for Chest pain       No current facility-administered medications for this encounter. Allergies: Patient has no known allergies.   Past Medical History:   Diagnosis Date    Arthritis     CAD (coronary artery disease)     Cancer ([address]     CHF (congestive heart failure) (HCC)     CKD (chronic kidney disease) stage 3, GFR 30-59 ml/min 5/10/2016    Colon cancer ([address]     Diabetes mellitus (HCC)     Diastolic heart failure ([address]     H/O partial resection of colon     Hypertension     Pacemaker     Palliative care patient 11/02/2018    Pneumonia 1/14/2019    Prostate CA ([address]     Pure hypercholesterolemia 5/10/2016    Seizures (HCC)     Sick sinus syndrome ([address]     Skin cancer        Past Surgical History:   Procedure Laterality Date    CARDIAC SURGERY      CHOLECYSTECTOMY      COLECTOMY      COLON SURGERY      SD DEBRIDEMENT, SKIN, SUB-Q TISSUE,=<20 SQ CM Left 11/1/2018    INCISION AND DRAINAGE AND EXCISIONAL DEBRIDEMENT OF SKIN AND SUBCUTANEOUS TISSUE OF LEFT FOOT ABSCESS 3.4D1D1HX performed by Kmuar Lo MD at [facility] OR     Family History   Problem Relation Age of Onset    Cancer Mother     Other Father      Social History     Tobacco Use    Smoking status: Former Smoker    Smokeless tobacco: Never Used   Substance Use Topics    Alcohol use: No         Review of Systems    Constitutional - no significant activity change, appetite change, or unexpected weight change. No fever or chills. No diaphoresis or significant fatigue. HENT - no significant rhinorrhea or epistaxis. No tinnitus or significant hearing loss. Eyes - no sudden vision change or amaurosis. Respiratory - no significant shortness of breath, wheezing, or stridor. No apnea, cough, or chest tightness associated with shortness of breath. Cardiovascular - no chest pain, syncope, or significant dizziness. No palpitations. Patient reports no claudication. Gastrointestinal - no abdominal swelling or pain. No blood in stool. No severe constipation, diarrhea, nausea, or vomiting. Genitourinary - No difficulty urinating, dysuria, frequency, or urgency. No flank pain or hematuria. Musculoskeletal - no back pain, gait disturbance, or myalgia. Skin - no color change, rash, pallor. Neurologic - no dizziness, facial asymmetry, or light headedness. No seizures. No speech difficulty or lateralizing weakness. Hematologic - no easy bruising or excessive bleeding. Psychiatric - no severe anxiety or nervousness. No confusion. All other review of systems are negative.     Physical Exam    /64   Pulse 65   Temp 96.7 °F (35.9 °C) (Temporal)   Resp 20   Ht 5' 8\" (1.727 m)   Wt 225 lb (102.1 kg)   BMI 34.21 kg/m²     Constitutional - well developed, well nourished. No diaphoresis or acute distress. HENT - head normocephalic. Right external ear canal appears normal.  Left external ear canal appears normal.  Septum appears midline. Lips,teeth,gums normal  Eyes - conjunctiva normal.  EOMS normal.  No exudate. No icterus. PERRLA  Neck- ROM appears normal, no tracheal deviation. Cardiovascular - Regular rate and rhythm. Heart sounds are normal.  No murmur, rub, or gallop. Carotid pulses are 2+ to palpation bilaterally without bruit. Extremities - Radial and brachial pulses are 2+ to palpation bilaterally. Femoral pulses: present 2+bilaterally;Popliteal pulses: present 2+bilaterally Right DP: present 1+; Right PT present 1+; Left DP: present 1+; Left PT: present 1+  No cyanosis, clubbing. 1+ edema. No signs atheroembolic event. Pulmonary - effort appears normal.  No respiratory distress. Lungs - Breath sounds normal. No wheezes or rales. GI - Abdomen - soft, non tender, bowel sounds X 4 quadrants. No guarding or rebound tenderness. No distension or palpable mass. Genitourinary - deferred. Musculoskeletal - ROM appears normal. 1+ edema. Skin: warm,dry  Neurologic - alert and oriented X 3. Sensation normal  Psychiatric - mood, affect, and behavior appear normal.  Judgment and thought processes appear normal.  Wound - B Heels,B great toe, L buttock    Wound Picture:                                    Assessment     B heel, B great toe, L buttock wounds    1. 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, with long-term current use of insulin ([address]    2. Chronic ulcer of right great toe, with fat layer exposed ([address]    3. Skin ulcer of great toe, left, with fat layer exposed ([address]    4. Ulcer of left heel, with fat layer exposed ([address]    5. Chronic heel ulcer, right, with fat layer exposed ([address]    6.  Buttock wound, left, initial encounter          Post Debridement Measurements and Assessment:  Incision 11/01/18 Foot Left (Active)   Number of Attached edges 02/02/21 1420   Number of days: 0       Wound 02/02/21 Heel Left Wound 3, Diabetic Polo 4, L. Heel (Active)   Wound Image   02/02/21 1420   Wound Etiology Diabetic Polo 4 02/02/21 1420   Dressing Status Dry 02/02/21 1420   Wound Cleansed Not Cleansed 02/02/21 1420   Wound Length (cm) 6.8 cm 02/02/21 1420   Wound Width (cm) 7.3 cm 02/02/21 1420   Wound Depth (cm) 0.1 cm 02/02/21 1420   Wound Surface Area (cm^2) 49.64 cm^2 02/02/21 1420   Wound Volume (cm^3) 4.96 cm^3 02/02/21 1420   Distance Tunneling (cm) 0 cm 02/02/21 1420   Tunneling Position ___ O'Clock 0 02/02/21 1420   Undermining Starts ___ O'Clock 0 02/02/21 1420   Undermining Ends___ O'Clock 0 02/02/21 1420   Undermining Maxium Distance (cm) 0 02/02/21 1420   Wound Assessment Eschar dry 02/02/21 1420   Drainage Amount None 02/02/21 1420   Odor None 02/02/21 1420   Gisele-wound Assessment Blanchable erythema 02/02/21 1420   Margins Attached edges 02/02/21 1420   Number of days: 0       Wound 02/02/21 Toe (Comment  which one) Left Wound 4, Diabetic Polo 4, L.  Great Toe (Active)   Wound Image   02/02/21 1420   Wound Etiology Diabetic Polo 4 02/02/21 1420   Dressing Status Dry 02/02/21 1420   Wound Cleansed Not Cleansed 02/02/21 1420   Wound Length (cm) 1 cm 02/02/21 1420   Wound Width (cm) 1.3 cm 02/02/21 1420   Wound Depth (cm) 0.1 cm 02/02/21 1420   Wound Surface Area (cm^2) 1.3 cm^2 02/02/21 1420   Wound Volume (cm^3) 0.13 cm^3 02/02/21 1420   Distance Tunneling (cm) 0 cm 02/02/21 1420   Tunneling Position ___ O'Clock 0 02/02/21 1420   Undermining Starts ___ O'Clock 0 02/02/21 1420   Undermining Ends___ O'Clock 0 02/02/21 1420   Undermining Maxium Distance (cm) 0 02/02/21 1420   Wound Assessment Eschar dry 02/02/21 1420   Drainage Amount None 02/02/21 1420   Odor None 02/02/21 1420   Gisele-wound Assessment Intact 02/02/21 1420   Margins Attached edges 02/02/21 1420   Number of days: 0       Wound 02/02/21 Buttocks Left Wound 5, Pressure Stage 3, L. Buttock (Active)   Wound Image    02/02/21 1420   Wound Etiology Pressure Stage  3 02/02/21 1420   Dressing Status Old drainage noted 02/02/21 1420   Wound Cleansed Soap and water 02/02/21 1420   Wound Length (cm) 0.5 cm 02/02/21 1420   Wound Width (cm) 0.6 cm 02/02/21 1420   Wound Depth (cm) 0.1 cm 02/02/21 1420   Wound Surface Area (cm^2) 0.3 cm^2 02/02/21 1420   Wound Volume (cm^3) 0.03 cm^3 02/02/21 1420   Distance Tunneling (cm) 0 cm 02/02/21 1420   Tunneling Position ___ O'Clock 0 02/02/21 1420   Undermining Starts ___ O'Clock 0 02/02/21 1420   Undermining Ends___ O'Clock 0 02/02/21 1420   Undermining Maxium Distance (cm) 0 02/02/21 1420   Wound Assessment Pink/red;Slough 02/02/21 1420   Drainage Amount Moderate 02/02/21 1420   Drainage Description Serosanguinous 02/02/21 1420   Odor None 02/02/21 1420   Gisele-wound Assessment Ecchymosis;Blanchable erythema 02/02/21 1420   Margins Attached edges 02/02/21 1420   Number of days: 0          The patients pain is  Pain Type: Acute pain 0. Plan for wound - Dress per physician order    Treatment:     Compression : No   Offloading : Yes   Dressing : SEE AVS   Additional Therapy : Xray B Feet, Labs, ONEL   Discussed appropriate home care of this wound. Wound redressed. Patient instructions were given. Follow up: 1 week.   Recommend no smoking  Offloading instructions given    Discharge Instructions         [address] [facility]   Physician Orders and Discharge Instructions  [facility]  [address] [place] [address]  Telephone: [phone]    NAME:  Alexsandra Hurtado:  1/5/1925  MEDICAL RECORD NUMBER:  [id number]  DATE:  2/2/2021    Discharge condition: Stable    Discharge to: Home    Left via:Private automobile    Accompanied by:  son    ECF/[facility]: [facility]    Dressing Orders:   Right and Left heels, Right and Left Great toes:   Wash with soap and Temperature over 101  * Increase in drainage from your wound  * Drainage with a foul odor  * Bleeding  * Increase in swelling  * Need for compression bandage changes due to slippage, breakthrough drainage. If you need medical attention outside of the business hours of the [address] [place] [address] Road please contact your PCP or go to the nearest emergency room.           Electronically signed by Kana Rocha MD on 2/2/21 at 4:11 PM CST

## 2021-02-02 NOTE — PLAN OF CARE
Problem: Wound:  Goal: Will show signs of wound healing; wound closure and no evidence of infection  Description: Will show signs of wound healing; wound closure and no evidence of infection  Outcome: Ongoing     Problem: Arterial:  Goal: Optimize blood flow for wound healing  Description: Optimize blood flow for wound healing  Outcome: Ongoing     Problem: Falls - Risk of:  Goal: Will remain free from falls  Description: Will remain free from falls  Outcome: Ongoing     Problem: Blood Glucose:  Goal: Ability to maintain appropriate glucose levels will improve  Description: Ability to maintain appropriate glucose levels will improve  Outcome: Ongoing

## 2021-02-02 NOTE — PROGRESS NOTES
Post-Discharge Transitional Care Management Services      Carlos Pinzon   YOB: 1925    Date of Visit:  2/2/2021  30 Day Post-Discharge Date: March 3, 2021    No Known Allergies  Outpatient Medications Marked as Taking for the 2/2/21 encounter (Office Visit) with Lisa Gupta MD   Medication Sig Dispense Refill    rosuvastatin (CRESTOR) 5 MG tablet       spironolactone (ALDACTONE) 25 MG tablet       insulin glargine (BASAGLAR KWIKPEN) 100 UNIT/ML injection pen Inject into the skin nightly      escitalopram (LEXAPRO) 10 MG tablet Take 1 tablet by mouth daily 30 tablet 3    amLODIPine (NORVASC) 5 MG tablet TAKE 1 TABLET DAILY (MUST KEEP FOLLOW UP FOR ADDITIONAL REFILLS) 90 tablet 3    ezetimibe (ZETIA) 10 MG tablet TAKE 1 TABLET DAILY 90 tablet 3    furosemide (LASIX) 40 MG tablet TAKE 1 TABLET DAILY 90 tablet 3    insulin lispro, 1 Unit Dial, (HUMALOG KWIKPEN) 100 UNIT/ML SOPN INJECT AS DIRECTED PER SLIDING SCALE 15 mL 11    insulin glargine (LANTUS SOLOSTAR) 100 UNIT/ML injection pen INJECT AS DIRECTED PER SLIDING SCALE 15 mL 11    Insulin Pen Needle 32G X 4 MM MISC 1 each by Does not apply route 4 times daily (before meals and nightly) 360 each 3    Continuous Blood Gluc Sensor (FREESTYLE PEPITO 14 DAY SENSOR) MISC APPLY EVERY 14 DAYS AS DIRECTED 2 each 5    carvedilol (COREG) 3.125 MG tablet TAKE 1 TABLET TWICE A  tablet 3    spironolactone (ALDACTONE) 25 MG tablet TAKE 1 TABLET DAILY 90 tablet 3    isosorbide dinitrate (ISORDIL) 5 MG tablet TAKE 3 TABLETS TWICE A  tablet 3    zoster recombinant adjuvanted vaccine (SHINGRIX) 50 MCG/0.5ML SUSR injection Inject 0.5 mLs into the muscle See Admin Instructions 1 dose now and repeat in 2-6 months 1 each 1    Sodium Hypochlorite 0.0125 % SOLN Apply topically      ELIQUIS 2.5 MG TABS tablet TAKE 1 TABLET TWICE A  tablet 4  Bismuth Tribromoph-Petrolatum (XEROFORM PETROLATUM GAUZE 5\"X9\") MISC external pads Apply 1 each topically every other day 15 each 0    polyethylene glycol (GLYCOLAX) powder Take 17 g by mouth daily 1 Bottle 5    B-D INS SYR ULTRAFINE 1CC/31G 31G X 5/16\" 1 ML MISC       timolol (TIMOPTIC) 0.5 % ophthalmic solution Place 1 drop into both eyes 2 times daily       Coenzyme Q10 (COQ10 PO) Take 10 mg by mouth nightly      Garlic 10 MG CAPS Take 1 capsule by mouth daily Pt unsure of dose. States it changes because he gets whatever is on sale.  Multiple Vitamins-Minerals (THERAPEUTIC MULTIVITAMIN-MINERALS) tablet Take 1 tablet by mouth daily      meclizine (ANTIVERT) 12.5 MG tablet Take 12.5 mg by mouth 2 times daily       nitroGLYCERIN (NITROSTAT) 0.4 MG SL tablet Place 0.4 mg under the tongue every 5 minutes as needed for Chest pain           Vitals:    02/02/21 1107 02/02/21 1113   BP: (!) 82/42 110/70   Pulse: 60    Resp: 20    SpO2: 96%    Weight: 225 lb (102.1 kg)    Height: 5' 8\" (1.727 m)      Body mass index is 34.21 kg/m². Wt Readings from Last 3 Encounters:   02/02/21 225 lb (102.1 kg)   12/06/20 224 lb (101.6 kg)   06/01/20 227 lb (103 kg)     BP Readings from Last 3 Encounters:   02/02/21 110/70   12/09/20 (!) 150/71   09/01/20 138/60        Patient was admitted to [address] [place] [address] from number 10/2020 to February 1, 2021 for colitis and weakness and failure to thrive.     HPI: Patient who was recently been in the nursing home and was discharged yesterday from [facility] after he been in the hospital with colitis. He was admitted back in December from [facility] and is now home in a private home with round-the-clock sitters. The only new complaint that he has now is some lesions on the ends of his big toes that look worrisome for gangrene he is a diabetic this been poorly controlled due to noncompliance. The nurses with him today states that his blood sugar in the nursing home was running in the 3-400 range. Inpatient course: Discharge summary reviewed- see chart. Current status: Guarded    Review of Systems:  Review of Systems   Constitutional: Positive for fatigue. Negative for activity change, appetite change and fever. HENT: Negative for congestion, hearing loss, sinus pressure, sore throat and trouble swallowing. Eyes: Negative for discharge and itching. Respiratory: Negative for shortness of breath and wheezing. Cardiovascular: Negative for chest pain, palpitations and leg swelling. Gastrointestinal: Negative for abdominal distention, abdominal pain, blood in stool, nausea and vomiting. Endocrine: Negative for cold intolerance, heat intolerance and polydipsia. Genitourinary: Negative for flank pain, frequency, hematuria and urgency. Musculoskeletal: Positive for arthralgias. Negative for back pain and joint swelling. Skin: Positive for wound. Negative for rash. Allergic/Immunologic: Negative for environmental allergies and food allergies. Neurological: Positive for weakness. Negative for dizziness, tremors, syncope, numbness and headaches. Hematological: Negative for adenopathy. Psychiatric/Behavioral: Positive for confusion. Negative for agitation and hallucinations. The patient is not nervous/anxious. Physical Exam:  Physical Exam  Constitutional:       General: He is not in acute distress. Appearance: He is well-developed. He is not diaphoretic. HENT:      Head: Normocephalic and atraumatic. Right Ear: External ear normal.      Left Ear: External ear normal.      Nose: Nose normal.      Mouth/Throat:      Pharynx: No oropharyngeal exudate. Eyes:      General: No scleral icterus. Right eye: No discharge. Left eye: No discharge. Conjunctiva/sclera: Conjunctivae normal.      Pupils: Pupils are equal, round, and reactive to light. Neck:      Musculoskeletal: Normal range of motion and neck supple. Thyroid: No thyromegaly. Vascular: No JVD. Trachea: No tracheal deviation. Cardiovascular:      Rate and Rhythm: Normal rate and regular rhythm. Heart sounds: Normal heart sounds. No murmur. No friction rub. No gallop. Pulmonary:      Effort: Pulmonary effort is normal. No respiratory distress. Breath sounds: Normal breath sounds. No wheezing or rales. Abdominal:      General: Bowel sounds are normal. There is no distension. Palpations: Abdomen is soft. There is no mass. Tenderness: There is no abdominal tenderness. There is no guarding or rebound. Musculoskeletal: Normal range of motion. General: No tenderness or deformity. Lymphadenopathy:      Cervical: No cervical adenopathy. Skin:     General: Skin is warm and dry. Coloration: Skin is not pale. Findings: No erythema or rash. Comments: Darkened areas on both large toes that look consistent with cellulitis with possible gangrene   Neurological:      Mental Status: He is alert and oriented to person, place, and time. Cranial Nerves: No cranial nerve deficit. Motor: No abnormal muscle tone. Coordination: Coordination normal.      Deep Tendon Reflexes: Reflexes are normal and symmetric. Reflexes normal.   Psychiatric:         Behavior: Behavior normal.         Thought Content:  Thought content normal.         Judgment: Judgment normal. Assessment/Plan:  Flex Hudson was seen today for follow-up from hospital.    Diagnoses and all orders for this visit:    Type 2 diabetes mellitus without complication, with long-term current use of insulin ([address]    Essential hypertension    Diabetic foot infection ([facility]    Type 2 diabetes mellitus without complication, without long-term current use of insulin (HCC)    Stage 3 chronic kidney disease, unspecified whether stage 3a or 3b CKD          Diagnostic test results reviewed: inpatient labs    Patient risk of morbidity and mortality: high    Medical Decision Making: high complexity  Patient is now in a private home with private round-the-clock sitters furnished by the [employer]. He needs 24-hour care per their week protocol. He needs to have nursing assessment to have done daily with the am giving medications to him twice a day. He is also going to need a wound care consult to look at these lesions on his feet. He needs dietary to follow along and make sure that he is compliant with his diabetic diet. He needs a skin breakdown assessment done daily with treatments as per the protocol of the home health nurses. He also needs physical therapy to work on strength and endurance due to his weakened debilitated state.

## 2021-02-03 NOTE — TELEPHONE ENCOUNTER
Voicemail: Shelly Bose with [facility] called needing pt's office visit not from yesterday, Feb 2nd, faxed to them for his urgent increase of care.

## 2021-02-03 NOTE — TELEPHONE ENCOUNTER
[facility] Transitions Initial Follow Up Call    Outreach made within 2 business days of discharge: Yes     Patent seen yesterday for TCM follow up. Visit was within 2 business days of discharge. No TCM note needed.      [facility] [address]

## 2021-02-04 NOTE — TELEPHONE ENCOUNTER
Hospice got a call from pt son asking for Hospice referral ?   Carmenza John said pt has declined quickly since back home from [facility]   Would you want a Hospice referral ?

## 2021-02-09 NOTE — TELEPHONE ENCOUNTER
Patients son Erwin Morrison called to report patient is now on hospice and they were wanting to know if his pacemaker was also a defibrillator. Informed him patient has a pacemaker only. Discussed carelink home monitoring and if they want to continue to check pacemaker. Next carelink is due 4/22/21. Erwin Morrison stated he wants to wait until then and decide at that time.

## 2021-04-05 ENCOUNTER — TELEPHONE (OUTPATIENT)
Dept: CARDIOLOGY CLINIC | Age: 86
End: 2021-04-05

## 2021-08-18 NOTE — PROGRESS NOTES
[facility] INTERNAL MEDICINE  [address] [id number] [address] [address]  Dept: [phone]  Dept Fax: [phone]: [phone]      Visit Date: 2/17/2020    Mya Sarabia a 80 y.o. male who presents today for:  Chief Complaint   Patient presents with    Other     recertification         HPI:     Joselyn Knowles is 80years old and in for 3-month visit. He is doing better. When we saw him last time his skin had been really gone down and now it is much better since he has been using the diaper dope. He has a history of colon cancer as well as multiple heart disease issues chronic kidney disease and he is under the care of the [employer] they are providing home care which he needs to continue.     Past Medical History:   Diagnosis Date    Arthritis     CAD (coronary artery disease)     Cancer ([address]     CHF (congestive heart failure) (HCC)     CKD (chronic kidney disease) stage 3, GFR 30-59 ml/min ([address] 5/10/2016    Colon cancer ([address]     Diabetes mellitus ([address]     Diastolic heart failure ([address]     H/O partial resection of colon     Hypertension     Pacemaker     Palliative care patient 11/02/2018    Pneumonia 1/14/2019    Prostate CA ([address]     Pure hypercholesterolemia 5/10/2016    Seizures (HCC)     Sick sinus syndrome ([address]     Skin cancer       Past Surgical History:   Procedure Laterality Date    CARDIAC SURGERY      CHOLECYSTECTOMY      COLECTOMY      COLON SURGERY      MA DEBRIDEMENT, SKIN, SUB-Q TISSUE,=<20 SQ CM Left 11/1/2018    INCISION AND DRAINAGE AND EXCISIONAL DEBRIDEMENT OF SKIN AND SUBCUTANEOUS TISSUE OF LEFT FOOT ABSCESS [id number] performed by Marcelina Das MD at [facility] OR       Family History   Problem Relation Age of Onset    Cancer Mother     Other Father        Social History     Tobacco Use    Smoking status: Former Smoker    Smokeless tobacco: Never Used   Substance Use Topics    Alcohol use: No      Current MG SL tablet Place 0.4 mg under the tongue every 5 minutes as needed for Chest pain       No current facility-administered medications for this visit. No Known Allergies      Subjective:     Review of Systems    Objective:      /60   Pulse 68   Wt 227 lb (103 kg)   SpO2 96%   BMI 35.55 kg/m²    Physical Exam     Assessment:      Diagnosis Orders   1. Type 2 diabetes mellitus without complication, without long-term current use of insulin (HCC)  Comprehensive Metabolic Panel    Hemoglobin A1C            Plan:     Colon cancer which is stable. He needs assistance on a daily basis monitoring habits heart failure issues as well as his diabetes. He still benefits from the [employer] wellness program.  He continues to  remain needing that and certified for that and I will see him back at his scheduled appointment. Have spent 25 minutes with patient today, 50% of the time is been spent counseling on cardiovascular risk factors, fall risk precautions, and immunizations,      Return in about 3 months (around 5/17/2020) for blood pressure check, diabetes check, LAB 1 WEEK BEFORE APPOINTMENT. Patient given educational materials- see patient instructions. Discussed use, benefit, and side effects of prescribedmedications. All patient questions answered. Pt voiced understanding. Reviewedhealth maintenance. Instructed to continue current medications, diet and exercise. Patient agreed with treatment plan. **This report has been created usingvoice recognition software. It may contain minor errors which are inherent in voicerecognition technology. **    Electronically signed by Harlan Kirby MD on 2/17/2020 at 4:37 PM Island Pedicle Flap-Requiring Vessel Identification Text: The defect edges were debeveled with a #15 scalpel blade.  Given the location of the defect, shape of the defect and the proximity to free margins an island pedicle advancement flap was deemed most appropriate.  Using a sterile surgical marker, an appropriate advancement flap was drawn, based on the axial vessel mentioned above, incorporating the defect, outlining the appropriate donor tissue and placing the expected incisions within the relaxed skin tension lines where possible.    The area thus outlined was incised deep to adipose tissue with a #15 scalpel blade.  The skin margins were undermined to an appropriate distance in all directions around the primary defect and laterally outward around the island pedicle utilizing iris scissors.  There was minimal undermining beneath the pedicle flap.

## (undated) DEVICE — CAUTERY TIP POLISHER: Brand: DEVON

## (undated) DEVICE — TOWEL,OR,DSP,ST,BLUE,DLX,4/PK,20PK/CS: Brand: MEDLINE

## (undated) DEVICE — SOLUTION IV IRRIG POUR BRL 0.9% SODIUM CHL 2F7124

## (undated) DEVICE — BANDAGE COMPR W4INXL15FT BGE E SGL LAYERED CLP CLSR

## (undated) DEVICE — BANDAGE GZ W45INXL4 1 10YD FLUF RL 6 PLY DERMACEA

## (undated) DEVICE — ASTOUND STANDARD SURGICAL GOWN, XXL: Brand: CONVERTORS

## (undated) DEVICE — TRAY PREP DRY W/ PREM GLV 2 APPL 6 SPNG 2 UNDPD 1 OVERWRAP

## (undated) DEVICE — THREE QUARTER SHEET: Brand: CONVERTORS

## (undated) DEVICE — MINOR CDS: Brand: MEDLINE INDUSTRIES, INC.

## (undated) DEVICE — GAUZE,SPONGE,FLUFF,6"X6.75",STRL,10/TRAY: Brand: MEDLINE